# Patient Record
Sex: MALE | Race: WHITE | NOT HISPANIC OR LATINO | Employment: OTHER | URBAN - METROPOLITAN AREA
[De-identification: names, ages, dates, MRNs, and addresses within clinical notes are randomized per-mention and may not be internally consistent; named-entity substitution may affect disease eponyms.]

---

## 2022-10-26 ENCOUNTER — APPOINTMENT (OUTPATIENT)
Dept: INTERVENTIONAL RADIOLOGY/VASCULAR | Facility: CLINIC | Age: 62
End: 2022-10-26
Attending: STUDENT IN AN ORGANIZED HEALTH CARE EDUCATION/TRAINING PROGRAM
Payer: COMMERCIAL

## 2022-10-26 ENCOUNTER — HOSPITAL ENCOUNTER (INPATIENT)
Facility: CLINIC | Age: 62
LOS: 12 days | Discharge: HOME HEALTH | End: 2022-11-07
Attending: STUDENT IN AN ORGANIZED HEALTH CARE EDUCATION/TRAINING PROGRAM | Admitting: INTERNAL MEDICINE
Payer: COMMERCIAL

## 2022-10-26 DIAGNOSIS — E10.9 INSULIN DEPENDENT DIABETES MELLITUS TYPE IA (H): ICD-10-CM

## 2022-10-26 DIAGNOSIS — K85.91 NECROTIZING PANCREATITIS: Primary | ICD-10-CM

## 2022-10-26 PROCEDURE — 250N000011 HC RX IP 250 OP 636

## 2022-10-26 PROCEDURE — 99223 1ST HOSP IP/OBS HIGH 75: CPT | Mod: GC | Performed by: INTERNAL MEDICINE

## 2022-10-26 PROCEDURE — 49406 IMAGE CATH FLUID PERI/RETRO: CPT

## 2022-10-26 PROCEDURE — C1729 CATH, DRAINAGE: HCPCS

## 2022-10-26 PROCEDURE — 272N000500 HC NEEDLE CR2

## 2022-10-26 PROCEDURE — 87075 CULTR BACTERIA EXCEPT BLOOD: CPT | Performed by: INTERNAL MEDICINE

## 2022-10-26 PROCEDURE — 250N000011 HC RX IP 250 OP 636: Performed by: STUDENT IN AN ORGANIZED HEALTH CARE EDUCATION/TRAINING PROGRAM

## 2022-10-26 PROCEDURE — C1769 GUIDE WIRE: HCPCS

## 2022-10-26 PROCEDURE — 258N000003 HC RX IP 258 OP 636

## 2022-10-26 PROCEDURE — 272N000192 HC ACCESSORY CR2

## 2022-10-26 PROCEDURE — 272N000504 HC NEEDLE CR4

## 2022-10-26 PROCEDURE — 87077 CULTURE AEROBIC IDENTIFY: CPT | Performed by: INTERNAL MEDICINE

## 2022-10-26 PROCEDURE — 99152 MOD SED SAME PHYS/QHP 5/>YRS: CPT | Mod: GC | Performed by: RADIOLOGY

## 2022-10-26 PROCEDURE — 250N000009 HC RX 250: Performed by: STUDENT IN AN ORGANIZED HEALTH CARE EDUCATION/TRAINING PROGRAM

## 2022-10-26 PROCEDURE — 87205 SMEAR GRAM STAIN: CPT | Performed by: INTERNAL MEDICINE

## 2022-10-26 PROCEDURE — 99152 MOD SED SAME PHYS/QHP 5/>YRS: CPT

## 2022-10-26 PROCEDURE — 99223 1ST HOSP IP/OBS HIGH 75: CPT | Mod: AI | Performed by: INTERNAL MEDICINE

## 2022-10-26 PROCEDURE — 120N000003 HC R&B IMCU UMMC

## 2022-10-26 PROCEDURE — 49406 IMAGE CATH FLUID PERI/RETRO: CPT | Mod: GC | Performed by: RADIOLOGY

## 2022-10-26 RX ORDER — NALOXONE HYDROCHLORIDE 0.4 MG/ML
0.2 INJECTION, SOLUTION INTRAMUSCULAR; INTRAVENOUS; SUBCUTANEOUS
Status: DISCONTINUED | OUTPATIENT
Start: 2022-10-26 | End: 2022-11-07 | Stop reason: HOSPADM

## 2022-10-26 RX ORDER — FLUMAZENIL 0.1 MG/ML
0.2 INJECTION, SOLUTION INTRAVENOUS
Status: DISCONTINUED | OUTPATIENT
Start: 2022-10-26 | End: 2022-10-26 | Stop reason: HOSPADM

## 2022-10-26 RX ORDER — SODIUM CHLORIDE, SODIUM LACTATE, POTASSIUM CHLORIDE, CALCIUM CHLORIDE 600; 310; 30; 20 MG/100ML; MG/100ML; MG/100ML; MG/100ML
INJECTION, SOLUTION INTRAVENOUS CONTINUOUS
Status: DISCONTINUED | OUTPATIENT
Start: 2022-10-26 | End: 2022-10-29

## 2022-10-26 RX ORDER — NALOXONE HYDROCHLORIDE 0.4 MG/ML
0.4 INJECTION, SOLUTION INTRAMUSCULAR; INTRAVENOUS; SUBCUTANEOUS
Status: DISCONTINUED | OUTPATIENT
Start: 2022-10-26 | End: 2022-11-07 | Stop reason: HOSPADM

## 2022-10-26 RX ORDER — PROCHLORPERAZINE 25 MG
25 SUPPOSITORY, RECTAL RECTAL EVERY 12 HOURS PRN
Status: DISCONTINUED | OUTPATIENT
Start: 2022-10-26 | End: 2022-11-07 | Stop reason: HOSPADM

## 2022-10-26 RX ORDER — AMOXICILLIN 250 MG
2 CAPSULE ORAL 2 TIMES DAILY PRN
Status: DISCONTINUED | OUTPATIENT
Start: 2022-10-26 | End: 2022-11-07 | Stop reason: HOSPADM

## 2022-10-26 RX ORDER — HYDROMORPHONE HYDROCHLORIDE 1 MG/ML
0.5 INJECTION, SOLUTION INTRAMUSCULAR; INTRAVENOUS; SUBCUTANEOUS
Status: DISCONTINUED | OUTPATIENT
Start: 2022-10-26 | End: 2022-10-27

## 2022-10-26 RX ORDER — METRONIDAZOLE 500 MG/100ML
500 INJECTION, SOLUTION INTRAVENOUS EVERY 8 HOURS
Status: DISCONTINUED | OUTPATIENT
Start: 2022-10-27 | End: 2022-10-27

## 2022-10-26 RX ORDER — ONDANSETRON 4 MG/1
4 TABLET, ORALLY DISINTEGRATING ORAL EVERY 6 HOURS PRN
Status: DISCONTINUED | OUTPATIENT
Start: 2022-10-26 | End: 2022-10-26

## 2022-10-26 RX ORDER — NALOXONE HYDROCHLORIDE 0.4 MG/ML
0.2 INJECTION, SOLUTION INTRAMUSCULAR; INTRAVENOUS; SUBCUTANEOUS
Status: DISCONTINUED | OUTPATIENT
Start: 2022-10-26 | End: 2022-10-26 | Stop reason: HOSPADM

## 2022-10-26 RX ORDER — CEFAZOLIN SODIUM 2 G/100ML
2 INJECTION, SOLUTION INTRAVENOUS ONCE
Status: COMPLETED | OUTPATIENT
Start: 2022-10-26 | End: 2022-10-26

## 2022-10-26 RX ORDER — LIDOCAINE 40 MG/G
CREAM TOPICAL
Status: DISCONTINUED | OUTPATIENT
Start: 2022-10-26 | End: 2022-11-07 | Stop reason: HOSPADM

## 2022-10-26 RX ORDER — NALOXONE HYDROCHLORIDE 0.4 MG/ML
0.4 INJECTION, SOLUTION INTRAMUSCULAR; INTRAVENOUS; SUBCUTANEOUS
Status: DISCONTINUED | OUTPATIENT
Start: 2022-10-26 | End: 2022-10-26 | Stop reason: HOSPADM

## 2022-10-26 RX ORDER — PROCHLORPERAZINE MALEATE 5 MG
10 TABLET ORAL EVERY 6 HOURS PRN
Status: DISCONTINUED | OUTPATIENT
Start: 2022-10-26 | End: 2022-11-07 | Stop reason: HOSPADM

## 2022-10-26 RX ORDER — LIDOCAINE 40 MG/G
CREAM TOPICAL
Status: CANCELLED | OUTPATIENT
Start: 2022-10-26

## 2022-10-26 RX ORDER — POLYETHYLENE GLYCOL 3350 17 G/17G
17 POWDER, FOR SOLUTION ORAL DAILY PRN
Status: DISCONTINUED | OUTPATIENT
Start: 2022-10-26 | End: 2022-11-07 | Stop reason: HOSPADM

## 2022-10-26 RX ORDER — FENTANYL CITRATE 50 UG/ML
25-50 INJECTION, SOLUTION INTRAMUSCULAR; INTRAVENOUS EVERY 5 MIN PRN
Status: DISCONTINUED | OUTPATIENT
Start: 2022-10-26 | End: 2022-10-26 | Stop reason: HOSPADM

## 2022-10-26 RX ORDER — AMOXICILLIN 250 MG
1 CAPSULE ORAL 2 TIMES DAILY PRN
Status: DISCONTINUED | OUTPATIENT
Start: 2022-10-26 | End: 2022-11-07 | Stop reason: HOSPADM

## 2022-10-26 RX ORDER — ONDANSETRON 2 MG/ML
4 INJECTION INTRAMUSCULAR; INTRAVENOUS EVERY 6 HOURS PRN
Status: DISCONTINUED | OUTPATIENT
Start: 2022-10-26 | End: 2022-10-26

## 2022-10-26 RX ADMIN — FENTANYL CITRATE 50 MCG: 0.05 INJECTION, SOLUTION INTRAMUSCULAR; INTRAVENOUS at 18:37

## 2022-10-26 RX ADMIN — FENTANYL CITRATE 50 MCG: 0.05 INJECTION, SOLUTION INTRAMUSCULAR; INTRAVENOUS at 18:52

## 2022-10-26 RX ADMIN — MIDAZOLAM 1 MG: 1 INJECTION INTRAMUSCULAR; INTRAVENOUS at 19:00

## 2022-10-26 RX ADMIN — SODIUM CHLORIDE, POTASSIUM CHLORIDE, SODIUM LACTATE AND CALCIUM CHLORIDE: 600; 310; 30; 20 INJECTION, SOLUTION INTRAVENOUS at 17:51

## 2022-10-26 RX ADMIN — LIDOCAINE HYDROCHLORIDE 10 ML: 10 INJECTION, SOLUTION EPIDURAL; INFILTRATION; INTRACAUDAL; PERINEURAL at 19:32

## 2022-10-26 RX ADMIN — HYDROMORPHONE HYDROCHLORIDE 0.5 MG: 1 INJECTION, SOLUTION INTRAMUSCULAR; INTRAVENOUS; SUBCUTANEOUS at 21:01

## 2022-10-26 RX ADMIN — MIDAZOLAM 2 MG: 1 INJECTION INTRAMUSCULAR; INTRAVENOUS at 18:36

## 2022-10-26 RX ADMIN — CEFAZOLIN SODIUM 2 G: 2 INJECTION, SOLUTION INTRAVENOUS at 18:52

## 2022-10-26 RX ADMIN — SODIUM CHLORIDE, POTASSIUM CHLORIDE, SODIUM LACTATE AND CALCIUM CHLORIDE: 600; 310; 30; 20 INJECTION, SOLUTION INTRAVENOUS at 23:12

## 2022-10-26 ASSESSMENT — ACTIVITIES OF DAILY LIVING (ADL)
ADLS_ACUITY_SCORE: 35
TOILETING_ISSUES: NO
CHANGE_IN_FUNCTIONAL_STATUS_SINCE_ONSET_OF_CURRENT_ILLNESS/INJURY: NO
DIFFICULTY_EATING/SWALLOWING: NO
WALKING_OR_CLIMBING_STAIRS_DIFFICULTY: NO
ADLS_ACUITY_SCORE: 22
CONCENTRATING,_REMEMBERING_OR_MAKING_DECISIONS_DIFFICULTY: NO
ADLS_ACUITY_SCORE: 22
FALL_HISTORY_WITHIN_LAST_SIX_MONTHS: NO
DRESSING/BATHING_DIFFICULTY: NO
ADLS_ACUITY_SCORE: 25
WEAR_GLASSES_OR_BLIND: YES
DOING_ERRANDS_INDEPENDENTLY_DIFFICULTY: NO

## 2022-10-26 ASSESSMENT — COLUMBIA-SUICIDE SEVERITY RATING SCALE - C-SSRS
5. HAVE YOU STARTED TO WORK OUT OR WORKED OUT THE DETAILS OF HOW TO KILL YOURSELF? DO YOU INTEND TO CARRY OUT THIS PLAN?: NO
6. HAVE YOU EVER DONE ANYTHING, STARTED TO DO ANYTHING, OR PREPARED TO DO ANYTHING TO END YOUR LIFE?: NO
4. HAVE YOU HAD THESE THOUGHTS AND HAD SOME INTENTION OF ACTING ON THEM?: NO
3. HAVE YOU BEEN THINKING ABOUT HOW YOU MIGHT KILL YOURSELF?: NO
1. IN THE PAST MONTH, HAVE YOU WISHED YOU WERE DEAD OR WISHED YOU COULD GO TO SLEEP AND NOT WAKE UP?: NO
2. HAVE YOU ACTUALLY HAD ANY THOUGHTS OF KILLING YOURSELF IN THE PAST MONTH?: NO

## 2022-10-26 NOTE — LETTER
Transition Communication Hand-off for Care Transitions to Next Level of Care Provider    Name: Vernon Ortiz  : 1960  MRN #: [id number]  Primary Care Provider: MANISH ROSENBAUM     Primary Clinic: [address] [address]     Reason for Hospitalization:  Necrotizing pancreatitis [K85.91]  Admit Date/Time: 10/26/2022  4:57 PM  Discharge Date: 2022  Payor Source: Payor: BLUE PLUS / Plan: BLUE PLUS / Product Type: HMO /   Reason for Communication Hand-off Referral: Other Continuity of care    Discharge Plan:  Home with home health RN and outpatient follow up       Concern for non-adherence with plan of care:   No  Discharge Needs Assessment:  Needs    Flowsheet Row Most Recent Value   Equipment Currently Used at Home none   # of Referrals Placed by CTS External Care Coordination, Homecare          Follow-up specialty is recommended: Yes    Follow-up plan:    Future Appointments   Date Time Provider Department Center   2022 12:00 PM Juan Santos MD [facility]   2023 10:00 AM [facility]       Any outstanding tests or procedures:        Referrals     Future Labs/Procedures    Home Care Referral     Comments:    [facility] [phone], Fax [phone].    Your provider has ordered home health services. If you have not been contacted within 2 days of your discharge please call the inpatient department phone number at [phone] .          Supplies     Future Labs/Procedures    ALCOHOL WIPES PER BOX           Santos Recommendations:    Attached please find final discharge orders    Reshma Souza RN    AVS/Discharge Summary is the source of truth; this is a helpful guide for improved communication of patient story

## 2022-10-26 NOTE — LETTER
[facility] [address]  [address] [facility] [address] [facility] [address]  [phone]    FACSIMILE TRANSMITTAL SHEET    TO: [facility] [phone], Fax [phone].    _____URGENT _____REVIEW ONLY _____PLEASE COMMENT____PLEASE REPLY    NOTES/COMMENTS: Please see attached clinical information for Vernon Angel.  Will fax final discharge orders when available.  Plan for home RN.  Anticipate discharge home on Friday 11/4.    Shannon Souza, RN BSN, PHN, ACM-RN  7A RN Care Coordinator  Phone: [phone]  Pager [phone]    To contact the weekend RNCC  [place] (0800 - 1630) Saturday and Sunday    Units: 4A, 4C, 4E, 5A and 5B- Pager 1: [phone]    Units: 6A, 6B, 6C, 6D- Pager 2: [phone]    Units: 7A, 7B, 7C, 7D, and 5C-Pager 3: [phone]    [facility] ([phone]) Saturday and Sunday    Units: 5 Ortho, 8A, 10 ICU, & Pediatric Units-Pager 4: [phone]    11/3/2022 2:12 PM                                        IF YOU DID NOT RECEIVE THE CORRECT NUMBER OF PAGES OR THE FAX DID NOT COME THROUGH CLEARLY, PLEASE CALL THE SENDER     CONFIDENTIALITY STATEMENT: Confidential information that may accompany this transmission contains protected health information under state and federal law and is legally privileged. This information is intended only for the use of the individual or entity named above and may be used only for carrying out treatment, payment or other healthcare operations. The recipient or person responsible for delivering this information is prohibited by law from disclosing this information without proper authorization to any other party, unless required to do so by law or regulation. If you are not the intended recipient, you are hereby notified that any review, dissemination, distribution, or copying of this message is strictly prohibited. If you have received this communication in error, please destroy the materials and contact us immediately by calling the number  listed above. No response indicates that the information was received by the appropriate authorized party

## 2022-10-26 NOTE — H&P
[facility]    History and Physical - Medicine Service, MAROON TEAM        Date of Admission:  10/26/2022    Assessment & Plan      Vernon Ortiz is a 62 year old male admitted on 10/26/2022. He has a history of HLD and hypothyroidism and is admitted for necrotizing pancreatitis.    #Necrotizing pancreatitis  Patient presenting with necrotizing pancreatitis from OSH for VARD. The patient has been vitally stable, will go for IR drain this evening with GI to see patient in the am. WBC improved to 11.7 today, lipase decreased to 650 from 1290, LFTs are improving. Troponin negative, TG WNL. Denies trauma or recent abdominal procedure. Had similar abdominal pain noted in PCP visit in January without further workup at that time. No known autoimmune disease, rare alcohol use, no recent steroids. Patient not on any commonly offending drugs.   Etiology: idiopathic most likely  -  ml/hr continuous  - NPO  - Continue Cefipime and ISAAC  - GI consulted  - IR to take patient for drain  -->drain to gravity, can start flushing tomorrow, drain cares  - Pain regimen: Dilaudid 0.5 mg Q3H PRN  - Nausea: Compazine PRN, HELD zofran 2/2 elongated QTc    -- CHRONIC --  #HLD  Lipids WNL as above  - HELD Simvastatin 40 mg     #Hypothyroidism  - HELD Levothyroxine 100 mcg     Diet: NPO for Medical/Clinical Reasons Except for: Meds    DVT Prophylaxis: Taken for procedure, held anticoagulation and will defer to GI recs  Hare Catheter: Not present  Fluids: 200 ml/hr continuous  Central Lines: None  Cardiac Monitoring: None  Code Status: Full Code      Clinically Significant Risk Factors Present on Admission                              Disposition Plan  Pending GI and procedures     Expected Discharge Date: 10/30/2022                The patient's care was discussed with the Attending Physician, Dr. Corcoran.    Jennifer Smart MD  Medicine Service, MAROON TEAM   [facility]  [facility]  Securely message with the [employer] Web Console (learn more here)  Text page via [facility] Paging/Directory   Please see signed in provider for up to date coverage information    ______________________________________________________________________    Chief Complaint   Necrotizing Pancreatitis    History is obtained from the patient    History of Present Illness   Vernon Ortiz is a 62 year old male who presents with necrotizing pancreatitis from OSH.    Per OSH HPI:  62-year-old male who presents to the ED with complaints of abdominal pain and distention since Sunday evening. Pain initially presented in the epigastric region that felt like heartburn. Patient has been vomiting since Sunday evening after eating last emesis being at 3 AM. Did have a protein shake this morning at 830 and has not vomited since. last bowel movement on Sunday. Reports that he has been passing minimal gas. Currently, describes the pain is diffuse and abdomen is tender. Patient reports that he has been diaphoretic and that he is generally weak due to decreased oral intake reports that he has been trying to stay hydrated hydrated, however does feel that his urine has become increasingly darker. Denies chills and fever. Denies any recent illness. Denies any history of bowel obstruction reports that he had an appendectomy in 2018, no other surgical history. Patient is currently denying chest pain and shortness of breath. Denies any vision changes, Flank pain, headache, dysuria, dizziness, nausea, or diarrhea. Did take a home COVID test yesterday that was negative.    WORKUP SUMMARY  - Patient presented the clinic on Tuesday morning with abdominal pain since Sunday. He had associated with not really having a bowel movement. His last bowel movement was Sunday. At that time he had no overt/remarkable fever or chills or sweats or diarrhea. In the evaluation his lipase was noted to be 1300 and LFTs normal.  - He was transferred  to the ER. CT scan revealed acute pancreatitis, necrosis versus infection.  - GI [facility] consulted via phone. Recommended no antibiotics. Recommended fluids and pain control. Did state at that time they would not be able to take/admit to [facility]. Working recommendation would be that he would need an ERCP and higher level of care such as [facility] or [facility] would be appropriate. [facility] consulted via phone. Not able to take due to lack of beds.  - He was noted to have an acute kidney injury. This was treated with IV fluids.  - Initially his lactic acid was elevated, this improved, [place]ston got worse, now improved  - He was initially given IV fluids 3 L normal saline. This was transitioned to lactated Ringer's at 300/h. 2100 on Tuesday, 10/25/2022  - Dilaudid has been ordered for pain control. He has needed this intermittently  - Did spike fever of 102+ on Tuesday, 10/25/2022.  - Working plan was to start antibiotics with any fever. IV cefepime and Flagyl started Tuesday, 10/25/2022  - Hypomagnesia -- electrolyte protocol  - 6 AM labs Wednesday, 10/26/2022. Lactic acid normal. Creatinine 1.3. LFTs elevated. CBC stable. Lipase 1290 last night. Pending this morning. CRP elevated 31.16 last night. CRP 35.08 this morning. Lipase 650 this morning. Amylase 674 this morning. AST ALT slightly elevated this morning.    Patient was transferred to the [facility] in stable condition. On my interview, the patient reports feeling beaten down from the past few days. He confirms his initial presenting complaints. States that he is not nauseous and has not vomited today. States his pain is moderately controlled, currently 4-6/10 which is what is has been since admission. No fevers or chills. Otherwise no change in status. Interview interrupted by transport coming to take the patient to IR.    Review of Systems    The 10 point Review of Systems is negative other than noted in the HPI or  here.     Past Medical History    I have reviewed this patient's medical history and updated it with pertinent information if needed.   No past medical history on file. No significant PMH    Past Surgical History   I have reviewed this patient's surgical history and updated it with pertinent information if needed.  Appendectomy 2018    Social History   I have reviewed this patient's social history and updated it with pertinent information if needed. Vernon Ortiz  No significant alcohol consumption.     Family History   Not pertinent    Prior to Admission Medications   None     Allergies   No Known Allergies    Physical Exam   Vital Signs: Temp: 98  F (36.7  C)   BP: (!) 137/94 Pulse: 103   Resp: 20 SpO2: 97 %      Weight: 0 lbs 0 oz    Constitutional: awake, alert, cooperative, no apparent distress, and appears stated age  Eyes: lids and lashes normal and extra-ocular muscles intact  Respiratory: No increased work of breathing, good air exchange, clear to auscultation bilaterally, no crackles or wheezing  Cardiovascular: regular rate and rhythm and no edema  GI: distended and diffusely TTP without rebound or guarding, no peritonitic signs  Skin: no bruising or bleeding, normal skin color, texture, turgor and no rashes  Neurologic: Awake, alert, oriented to name, place and time.  Cranial nerves II-XII are grossly intact.    Data   Data reviewed today: I reviewed all medications, new labs and imaging results over the last 24 hours. I personally reviewed   EKG QTc 541  Imaging:  US ABD RUQ    Result Date: 10/25/2022  EXAM: US ABD RUQ INDICATION: Abdominal pain, necrotizing pancreatitis. COMPARISON: CT abdomen/pelvis from 10/25/2022 FINDINGS: Liver parenchyma demonstrates coarse heterogeneous echogenicity. No focal hepatic lesion is seen. No intrahepatic biliary dilatation. Common bile duct is not visualized on ultrasound however did not appear dilated on CT scan. Trace fluid adjacent to the gallbladder. Gallbladder is  normal in size. Gallbladder wall is upper limits of normal in thickness at 3 mm. Negative reported sonographic Parson's sign. No gallstones or sludge. The pancreas is not visualized and obscured by the gas. Right kidney measures 11.7 cm in length without evidence for hydronephrosis. There are 2 cysts seen of the right kidney measuring up to 3.8 x 4.3 cm in size. IMPRESSION: 1. Pancreas is not visualized due to artifact from gas. On CT, patient had nonenhancing pancreatic parenchyma and gas within the pancreatic bed consistent with emphysematous necrotizing pancreatitis. 2. No gallstones or biliary dilatation. 3. Right renal cysts.     XR CHEST ONE VIEW PORTABLE    Result Date: 10/25/2022  EXAM: XR CHEST ONE VIEW PORTABLE INDICATION: Fever COMPARISON: 01/08/2022 FINDINGS: Frontal view chest obtained. Low lung volumes. Cardiomediastinal silhouette is normal. Hazy retrocardiac opacities. No effusion or pneumothorax. Osseous structures are normal limits. IMPRESSION: 1. Low lung volumes limit evaluation. 2. Retrocardiac opacities likely atelectasis though pneumonia could have a similar appearance.     CT ABD AND PELVIS WITH IV CONTRAST    Result Date: 10/25/2022  EXAM: CT ABD AND PELVIS WITH IV CONTRAST  IMPRESSION: 1. Pancreatic hypoattenuation and adjacent stranding consistent with acute pancreatitis, unclear as to whether this represents simple edema or necrosis. There is a focal complex fluid and gas collection along the superior aspect of the pancreatic body/tail junction suspicious for superimposed infection. This is not an organized abscess at this time and given location is not amenable to percutaneous drainage. Hollow viscus perforation considered much less likely. 2. Reactive fluid along the pericolic gutters and small volume pelvic free fluid. Few prominent loops of small bowel in the left upper quadrant most likely reactive as well. 3. Distended gallbladder. No secondary evidence of acute cholecystitis or  evidence for cholelithiasis. COMMUNICATION: CODE 3: This report contains unexpected but non-urgent abnormality that requires clinical follow-up or additional imaging. The information above was relayed directly by me by telephone to FIONA HERBERT at 11:11 on 10/25/2022.     No lab results found in last 7 days.

## 2022-10-26 NOTE — LETTER
[facility] [address] [place]  [address] [address]  [phone]    FACSIMILE TRANSMITTAL SHEET    TO: [facility]    _____URGENT _____REVIEW ONLY _____PLEASE COMMENT____PLEASE REPLY    NOTES/COMMENTS: Attached please find final discharge orders for Vernon Ortiz.  Patient discharging home today.  Questions please let me know.  Thanks    Shannon Souza, RN BSN, PHN, ACM-RN  7A RN Care Coordinator  Phone: [phone]  Pager [phone]    To contact the weekend RNCC  [place] (0800 - 1630) Saturday and Sunday    Units: 4A, 4C, 4E, 5A and 5B- Pager 1: [phone]    Units: 6A, 6B, 6C, 6D- Pager 2: [phone]    Units: 7A, 7B, 7C, 7D, and 5C-Pager 3: [phone]    [facility] ([phone]) Saturday and Sunday    Units: 5 Ortho, 8A, 10 ICU, & Pediatric Units-Pager 4: [phone]    11/7/2022 2:23 PM                                      IF YOU DID NOT RECEIVE THE CORRECT NUMBER OF PAGES OR THE FAX DID NOT COME THROUGH CLEARLY, PLEASE CALL THE SENDER     CONFIDENTIALITY STATEMENT: Confidential information that may accompany this transmission contains protected health information under state and federal law and is legally privileged. This information is intended only for the use of the individual or entity named above and may be used only for carrying out treatment, payment or other healthcare operations. The recipient or person responsible for delivering this information is prohibited by law from disclosing this information without proper authorization to any other party, unless required to do so by law or regulation. If you are not the intended recipient, you are hereby notified that any review, dissemination, distribution, or copying of this message is strictly prohibited. If you have received this communication in error, please destroy the materials and contact us immediately by calling the number listed above. No response indicates that the information was received  by the appropriate authorized party

## 2022-10-26 NOTE — PROGRESS NOTES
Admission          10/26/2022  4:57 PM  -----------------------------------------------------------  Reason for admission:  Primary team notified of pt arrival.  Admitted from:  Via: stretcher  Accompanied by: EMS  Belongings: only brought glasses, family bringing clothes and cell phone  Admission Profile: complete  Teaching: orientation to unit and call light- call light within reach, call don't fall, use of console, meal times, when to call for the RN, and enforced importance of safety   Access: PIV  Telemetry:Placed on pt  Code Status verified on armband: yes  2 RN Skin Assessment Completed By: Jennifer GUADALUPE   Med Rec completed: no  Bed surface reassessed with algorithm and charted: yes  New bed surface ordered: no  Suction/Ambu bag/Flowmeter at bedside: yes  Is patient having diarrhea upon admission- if YES fill out testing algorithm : no    Pt status:  Patient alert and oriented x4. 's, BP's 130's/90's, afebrile, on 2 L NC. SOB present with activity. Complains of abdominal pain. Patient sent down to IR for drain placement. Will continue with plan of care.

## 2022-10-26 NOTE — PRE-PROCEDURE
GENERAL PRE-PROCEDURE:   Procedure:  Image guided abdominal drain placement    Verbal consent obtained?: Yes    Written consent obtained?: Yes    Risks and benefits: Risks, benefits and alternatives were discussed    Consent given by:  Patient  Patient states understanding of procedure being performed: Yes    Patient's understanding of procedure matches consent: Yes    Procedure consent matches procedure scheduled: Yes    Expected level of sedation:  Moderate  Appropriately NPO:  Yes  ASA Class:  3  Mallampati  :  Grade 2- soft palate, base of uvula, tonsillar pillars, and portion of posterior pharyngeal wall visible  Lungs:  Lungs clear with good breath sounds bilaterally  Heart:  Normal heart sounds with tachycardia  History & Physical reviewed:  History and physical reviewed and no updates needed  Statement of review:  I have reviewed the lab findings, diagnostic data, medications, and the plan for sedation

## 2022-10-26 NOTE — IR NOTE
Patient Name: Vernon Ortiz  Medical Record Number: [id number]  Today's Date: 10/26/2022    Procedure: abdominal drain placement  Proceduralist: Norma Arce and Pelon    Procedure Start: 1845  Procedure end: 1950  Sedation medications administered: versed 3 Mg., fentanyl 100  Mcg.    Report given to: 6B RN    Other Notes: Pt arrived to IR room 1 from . Consent reviewed. Pt denies any questions or concerns regarding procedure. Pt positioned supine and monitored per protocol. Pt tolerated procedure without any noted complications. Pt transferred back to .

## 2022-10-26 NOTE — PROGRESS NOTES
[facility]  Transfer Triage Note    Date of call: 10/26/22  Time of call: 10:24 AM    Current Patient Location: [facility]  Current Level of Care: ICU    Vitals: BP:100s systolic HR: 100s O2 Sats: RA  Diagnosis: nec panc  Is COVID-19 a concern? No  Reason for requested transfer: Procedure can be done here and not at referring hospital   Isolation Needs: None    Outside Records: Available  Additional records may be faxed to [phone].    Transfer accepted: Yes  Stability of Patient: Patient is at risk for instability, however patient requires urgent transfer and does not meet ICU criteria   Level of Care Needed: IMC  Telemetry Needed:  None  Expected Time of Arrival for Transfer: 0-8 hours  Arrival Location:  [facility]    Recommendations for Management and Stabilization: Not needed    Additional Comments:61 yo with no significant PMH presenting with nec panc to outside hospital.  Has lipase in the 1300s with CRP >30 and mild darcy.  Patient initially ill appearing and started on IV fluids.  GI at [facility] reports that he does not need ERCP and recommended transfer to [place] or [facility].  Now febrile 102 with lactic acidosis.  Started on cefepime and flagyl.  OSH GI is recommending VARD.  Dr. Santos joined line and recommends urgent transfer.  The patient is now clinically stable, but Dr. Santos feels this could change rapidly if intervention does not occur within the next 24 hours.  Dr. Santos to discuss with IR and gen surgery for possible VARD planning/timing.  Requesting IMC bed with medicine primary.    Chino Somers MD

## 2022-10-27 ENCOUNTER — APPOINTMENT (OUTPATIENT)
Dept: GENERAL RADIOLOGY | Facility: CLINIC | Age: 62
End: 2022-10-27
Attending: PHYSICIAN ASSISTANT
Payer: COMMERCIAL

## 2022-10-27 LAB
ALBUMIN SERPL BCG-MCNC: 2.9 G/DL (ref 3.5–5.2)
ALP SERPL-CCNC: 47 U/L (ref 40–129)
ALT SERPL W P-5'-P-CCNC: 42 U/L (ref 10–50)
ANION GAP SERPL CALCULATED.3IONS-SCNC: 8 MMOL/L (ref 7–15)
AST SERPL W P-5'-P-CCNC: 49 U/L (ref 10–50)
ATRIAL RATE - MUSE: 98 BPM
BILIRUB SERPL-MCNC: 0.9 MG/DL
BUN SERPL-MCNC: 23.3 MG/DL (ref 8–23)
CALCIUM SERPL-MCNC: 7.9 MG/DL (ref 8.8–10.2)
CHLORIDE SERPL-SCNC: 106 MMOL/L (ref 98–107)
CREAT SERPL-MCNC: 0.99 MG/DL (ref 0.67–1.17)
CRP SERPL-MCNC: 262 MG/L
DEPRECATED HCO3 PLAS-SCNC: 25 MMOL/L (ref 22–29)
DIASTOLIC BLOOD PRESSURE - MUSE: NORMAL MMHG
ERYTHROCYTE [DISTWIDTH] IN BLOOD BY AUTOMATED COUNT: 13.6 % (ref 10–15)
GFR SERPL CREATININE-BSD FRML MDRD: 85 ML/MIN/1.73M2
GLUCOSE SERPL-MCNC: 157 MG/DL (ref 70–99)
HBA1C MFR BLD: 5.6 %
HCT VFR BLD AUTO: 42.4 % (ref 40–53)
HGB BLD-MCNC: 14.1 G/DL (ref 13.3–17.7)
INTERPRETATION ECG - MUSE: NORMAL
MAGNESIUM SERPL-MCNC: 2.1 MG/DL (ref 1.7–2.3)
MCH RBC QN AUTO: 30 PG (ref 26.5–33)
MCHC RBC AUTO-ENTMCNC: 33.3 G/DL (ref 31.5–36.5)
MCV RBC AUTO: 90 FL (ref 78–100)
NT-PROBNP SERPL-MCNC: 170 PG/ML (ref 0–900)
P AXIS - MUSE: 49 DEGREES
PHOSPHATE SERPL-MCNC: 2 MG/DL (ref 2.5–4.5)
PLATELET # BLD AUTO: 134 10E3/UL (ref 150–450)
POTASSIUM SERPL-SCNC: 4.6 MMOL/L (ref 3.4–5.3)
PR INTERVAL - MUSE: 144 MS
PROT SERPL-MCNC: 5.6 G/DL (ref 6.4–8.3)
QRS DURATION - MUSE: 74 MS
QT - MUSE: 324 MS
QTC - MUSE: 413 MS
R AXIS - MUSE: 28 DEGREES
RBC # BLD AUTO: 4.7 10E6/UL (ref 4.4–5.9)
SARS-COV-2 RNA RESP QL NAA+PROBE: NEGATIVE
SODIUM SERPL-SCNC: 139 MMOL/L (ref 136–145)
SYSTOLIC BLOOD PRESSURE - MUSE: NORMAL MMHG
T AXIS - MUSE: 48 DEGREES
VENTRICULAR RATE- MUSE: 98 BPM
WBC # BLD AUTO: 12 10E3/UL (ref 4–11)

## 2022-10-27 PROCEDURE — 85027 COMPLETE CBC AUTOMATED: CPT

## 2022-10-27 PROCEDURE — 80053 COMPREHEN METABOLIC PANEL: CPT

## 2022-10-27 PROCEDURE — 250N000013 HC RX MED GY IP 250 OP 250 PS 637: Performed by: PHYSICIAN ASSISTANT

## 2022-10-27 PROCEDURE — 84100 ASSAY OF PHOSPHORUS: CPT

## 2022-10-27 PROCEDURE — 258N000003 HC RX IP 258 OP 636

## 2022-10-27 PROCEDURE — 999N000248 HC STATISTIC IV INSERT WITH US BY RN

## 2022-10-27 PROCEDURE — 99223 1ST HOSP IP/OBS HIGH 75: CPT | Mod: GC | Performed by: SURGERY

## 2022-10-27 PROCEDURE — 250N000011 HC RX IP 250 OP 636

## 2022-10-27 PROCEDURE — 120N000003 HC R&B IMCU UMMC

## 2022-10-27 PROCEDURE — 83735 ASSAY OF MAGNESIUM: CPT

## 2022-10-27 PROCEDURE — 71045 X-RAY EXAM CHEST 1 VIEW: CPT

## 2022-10-27 PROCEDURE — 93005 ELECTROCARDIOGRAM TRACING: CPT

## 2022-10-27 PROCEDURE — 99232 SBSQ HOSP IP/OBS MODERATE 35: CPT | Performed by: SURGERY

## 2022-10-27 PROCEDURE — 83880 ASSAY OF NATRIURETIC PEPTIDE: CPT | Performed by: PHYSICIAN ASSISTANT

## 2022-10-27 PROCEDURE — 250N000009 HC RX 250: Performed by: STUDENT IN AN ORGANIZED HEALTH CARE EDUCATION/TRAINING PROGRAM

## 2022-10-27 PROCEDURE — 250N000009 HC RX 250: Performed by: PHYSICIAN ASSISTANT

## 2022-10-27 PROCEDURE — 0W9G30Z DRAINAGE OF PERITONEAL CAVITY WITH DRAINAGE DEVICE, PERCUTANEOUS APPROACH: ICD-10-PCS | Performed by: RADIOLOGY

## 2022-10-27 PROCEDURE — 36415 COLL VENOUS BLD VENIPUNCTURE: CPT | Performed by: PHYSICIAN ASSISTANT

## 2022-10-27 PROCEDURE — U0005 INFEC AGEN DETEC AMPLI PROBE: HCPCS | Performed by: PHYSICIAN ASSISTANT

## 2022-10-27 PROCEDURE — 999N000157 HC STATISTIC RCP TIME EA 10 MIN

## 2022-10-27 PROCEDURE — 999N000128 HC STATISTIC PERIPHERAL IV START W/O US GUIDANCE

## 2022-10-27 PROCEDURE — 94640 AIRWAY INHALATION TREATMENT: CPT

## 2022-10-27 PROCEDURE — 86140 C-REACTIVE PROTEIN: CPT | Performed by: PHYSICIAN ASSISTANT

## 2022-10-27 PROCEDURE — 250N000011 HC RX IP 250 OP 636: Performed by: PHYSICIAN ASSISTANT

## 2022-10-27 PROCEDURE — 93010 ELECTROCARDIOGRAM REPORT: CPT | Performed by: INTERNAL MEDICINE

## 2022-10-27 PROCEDURE — 87040 BLOOD CULTURE FOR BACTERIA: CPT | Performed by: PHYSICIAN ASSISTANT

## 2022-10-27 PROCEDURE — 258N000003 HC RX IP 258 OP 636: Performed by: STUDENT IN AN ORGANIZED HEALTH CARE EDUCATION/TRAINING PROGRAM

## 2022-10-27 PROCEDURE — 99207 PR APP CREDIT; MD BILLING SHARED VISIT: CPT | Performed by: PHYSICIAN ASSISTANT

## 2022-10-27 PROCEDURE — 71045 X-RAY EXAM CHEST 1 VIEW: CPT | Mod: 26 | Performed by: RADIOLOGY

## 2022-10-27 PROCEDURE — 258N000003 HC RX IP 258 OP 636: Performed by: PHYSICIAN ASSISTANT

## 2022-10-27 PROCEDURE — 83036 HEMOGLOBIN GLYCOSYLATED A1C: CPT | Performed by: PHYSICIAN ASSISTANT

## 2022-10-27 PROCEDURE — 999N000202 HC STATISTICAL VASC ACCESS NURSE TIME, 1-15 MINUTES

## 2022-10-27 PROCEDURE — 99232 SBSQ HOSP IP/OBS MODERATE 35: CPT | Performed by: INTERNAL MEDICINE

## 2022-10-27 PROCEDURE — 36415 COLL VENOUS BLD VENIPUNCTURE: CPT

## 2022-10-27 RX ORDER — HYDROMORPHONE HYDROCHLORIDE 2 MG/1
2 TABLET ORAL EVERY 4 HOURS PRN
Status: DISCONTINUED | OUTPATIENT
Start: 2022-10-27 | End: 2022-11-07 | Stop reason: HOSPADM

## 2022-10-27 RX ORDER — HYDROMORPHONE HYDROCHLORIDE 1 MG/ML
.3-.5 INJECTION, SOLUTION INTRAMUSCULAR; INTRAVENOUS; SUBCUTANEOUS
Status: DISCONTINUED | OUTPATIENT
Start: 2022-10-27 | End: 2022-10-30

## 2022-10-27 RX ORDER — ALBUTEROL SULFATE 0.83 MG/ML
SOLUTION RESPIRATORY (INHALATION)
Status: DISCONTINUED
Start: 2022-10-27 | End: 2022-10-28 | Stop reason: HOSPADM

## 2022-10-27 RX ORDER — ACETAMINOPHEN 325 MG/1
650 TABLET ORAL EVERY 6 HOURS PRN
Status: DISCONTINUED | OUTPATIENT
Start: 2022-10-27 | End: 2022-11-07 | Stop reason: HOSPADM

## 2022-10-27 RX ORDER — AMOXICILLIN 250 MG
1 CAPSULE ORAL DAILY
Status: DISCONTINUED | OUTPATIENT
Start: 2022-10-27 | End: 2022-11-07 | Stop reason: HOSPADM

## 2022-10-27 RX ORDER — ACETAMINOPHEN 500 MG
500-1000 TABLET ORAL EVERY 6 HOURS PRN
COMMUNITY

## 2022-10-27 RX ORDER — POLYETHYLENE GLYCOL 3350 17 G/17G
17 POWDER, FOR SOLUTION ORAL DAILY
Status: DISCONTINUED | OUTPATIENT
Start: 2022-10-27 | End: 2022-10-30

## 2022-10-27 RX ORDER — MEROPENEM 1 G/1
1 INJECTION, POWDER, FOR SOLUTION INTRAVENOUS EVERY 8 HOURS
Status: DISCONTINUED | OUTPATIENT
Start: 2022-10-27 | End: 2022-10-29

## 2022-10-27 RX ORDER — SIMVASTATIN 40 MG
40 TABLET ORAL AT BEDTIME
COMMUNITY

## 2022-10-27 RX ORDER — OXYCODONE HYDROCHLORIDE 5 MG/1
5-10 TABLET ORAL EVERY 6 HOURS PRN
Status: DISCONTINUED | OUTPATIENT
Start: 2022-10-27 | End: 2022-10-27

## 2022-10-27 RX ORDER — HYDRALAZINE HYDROCHLORIDE 20 MG/ML
10 INJECTION INTRAMUSCULAR; INTRAVENOUS EVERY 6 HOURS PRN
Status: DISCONTINUED | OUTPATIENT
Start: 2022-10-27 | End: 2022-11-07 | Stop reason: HOSPADM

## 2022-10-27 RX ORDER — LEVOTHYROXINE SODIUM 100 UG/1
100 TABLET ORAL EVERY MORNING
COMMUNITY

## 2022-10-27 RX ORDER — LEVOTHYROXINE SODIUM 100 UG/1
100 TABLET ORAL DAILY
Status: DISCONTINUED | OUTPATIENT
Start: 2022-10-27 | End: 2022-11-07 | Stop reason: HOSPADM

## 2022-10-27 RX ORDER — ALBUTEROL SULFATE 0.83 MG/ML
2.5 SOLUTION RESPIRATORY (INHALATION) EVERY 6 HOURS PRN
Status: DISCONTINUED | OUTPATIENT
Start: 2022-10-27 | End: 2022-10-27

## 2022-10-27 RX ADMIN — HYDROMORPHONE HYDROCHLORIDE 0.5 MG: 1 INJECTION, SOLUTION INTRAMUSCULAR; INTRAVENOUS; SUBCUTANEOUS at 03:42

## 2022-10-27 RX ADMIN — SODIUM CHLORIDE, POTASSIUM CHLORIDE, SODIUM LACTATE AND CALCIUM CHLORIDE: 600; 310; 30; 20 INJECTION, SOLUTION INTRAVENOUS at 14:15

## 2022-10-27 RX ADMIN — HYDROMORPHONE HYDROCHLORIDE 0.5 MG: 1 INJECTION, SOLUTION INTRAMUSCULAR; INTRAVENOUS; SUBCUTANEOUS at 10:07

## 2022-10-27 RX ADMIN — METRONIDAZOLE 500 MG: 500 INJECTION, SOLUTION INTRAVENOUS at 09:56

## 2022-10-27 RX ADMIN — OXYCODONE HYDROCHLORIDE 10 MG: 5 TABLET ORAL at 12:41

## 2022-10-27 RX ADMIN — CEFEPIME HYDROCHLORIDE 2 G: 2 INJECTION, POWDER, FOR SOLUTION INTRAVENOUS at 09:00

## 2022-10-27 RX ADMIN — SODIUM CHLORIDE, POTASSIUM CHLORIDE, SODIUM LACTATE AND CALCIUM CHLORIDE: 600; 310; 30; 20 INJECTION, SOLUTION INTRAVENOUS at 20:57

## 2022-10-27 RX ADMIN — SENNOSIDES AND DOCUSATE SODIUM 1 TABLET: 8.6; 5 TABLET ORAL at 11:20

## 2022-10-27 RX ADMIN — MEROPENEM 1 G: 1 INJECTION, POWDER, FOR SOLUTION INTRAVENOUS at 11:21

## 2022-10-27 RX ADMIN — SODIUM CHLORIDE, POTASSIUM CHLORIDE, SODIUM LACTATE AND CALCIUM CHLORIDE: 600; 310; 30; 20 INJECTION, SOLUTION INTRAVENOUS at 04:56

## 2022-10-27 RX ADMIN — CEFEPIME HYDROCHLORIDE 2 G: 2 INJECTION, POWDER, FOR SOLUTION INTRAVENOUS at 00:58

## 2022-10-27 RX ADMIN — HYDROMORPHONE HYDROCHLORIDE 2 MG: 2 TABLET ORAL at 20:01

## 2022-10-27 RX ADMIN — POTASSIUM PHOSPHATE, MONOBASIC AND POTASSIUM PHOSPHATE, DIBASIC 15 MMOL: 224; 236 INJECTION, SOLUTION, CONCENTRATE INTRAVENOUS at 14:15

## 2022-10-27 RX ADMIN — LEVOTHYROXINE SODIUM 100 MCG: 100 TABLET ORAL at 11:21

## 2022-10-27 RX ADMIN — ALBUTEROL SULFATE 2.5 MG: 2.5 SOLUTION RESPIRATORY (INHALATION) at 20:32

## 2022-10-27 RX ADMIN — MEROPENEM 1 G: 1 INJECTION, POWDER, FOR SOLUTION INTRAVENOUS at 20:04

## 2022-10-27 RX ADMIN — ACETAMINOPHEN 650 MG: 325 TABLET, FILM COATED ORAL at 20:01

## 2022-10-27 RX ADMIN — METRONIDAZOLE 500 MG: 500 INJECTION, SOLUTION INTRAVENOUS at 02:03

## 2022-10-27 ASSESSMENT — ACTIVITIES OF DAILY LIVING (ADL)
ADLS_ACUITY_SCORE: 27
ADLS_ACUITY_SCORE: 25
ADLS_ACUITY_SCORE: 27

## 2022-10-27 NOTE — PROGRESS NOTES
flushed current PIV with 10ml NSS. no infiltration noted, pt. denies pain or discomfort, no tenderness or redness on the site, notified bedside nurse Catrina personally

## 2022-10-27 NOTE — PROGRESS NOTES
General Surgery Progress Note  10/27/2022   ------------------------------------------------------------------------------------------------  Assessment/Plan:   62 year old male with necrotizing pancreatitis status post IR drainage on 10/26/2022.  Doing well with no signs or symptoms of iatrogenic injury during drain placement or signs of decompensation.  Pain much better this morning.     - Continue fluid resuscitation and multimodal pain control per primary   - Drain appears to be properly placed with adequate drainage. Of note the pigtail portion of the drain is wrapping around the splenic vein, will need guidance or visualization when the drain is removed to protect the vasculature.    - No surgical intervention at this point, could be a candidate for a VARD in the future however for now we will allow the collection to further mature prior to any planned surgical debridement.    Subjective:  Doing well this morning, no acute events overnight.  Abdominal pain improving.  No chest pain no vomiting no nausea.  Passing flatus  ------------------------------------------------------------------------------------------------  Objective:  Temp:  [97.7  F (36.5  C)-99.6  F (37.6  C)] 98.4  F (36.9  C)  Pulse:  [] 87  Resp:  [20-28] 22  BP: ()/() 149/89  SpO2:  [91 %-97 %] 95 %    I/O last 3 completed shifts:  In: 2250 [I.V.:2240; Other:10]  Out: 965 [Urine:950; Drains:15]      Gen: Awake, interactive, NAD  Resp: breathing comfortably on room air  CV: regular rate, appears well perfused  Abd: soft, nondistended, nontender, retroperitoneal drain in place, draining dark fluid.  Incision: c/d/I  Ext: palpable pulses, no edema       Seen, examined, and discussed with chief resident, who will discuss with staff.    Kris Carrillo MD PGY1  Integrated Plastic and Reconstructive Surgery

## 2022-10-27 NOTE — CONSULTS
GENERAL SURGERY HISTORY & PHYSICAL    Patient: Vernon Ortiz   MRN: [id number]     Date of Admission: 10/26/2022    ASSESSMENT/PLAN:  Vernon Ortiz is a 62 year old male With history of dyslipidemia and hypothyroidism who presents for from OSH with necrotizing pancreatitis and is now status post IR anterior drain placement. EGS asked to evaluate for possible VARD and to follow in the event of decompensation after drain placement.    Currently seems to be doing quite well.  No signs or symptoms of iatrogenic injury during drain placement.  Continue care as per medicine no changes to current plan  Surgery to follow and be available    Patient and plan discussed with chief resident and in-house surgical staff    Maximino Wheatley MD PGY2  General Surgery    CC: Necrotizing pancreatitis    HPI: Vernon Ortiz is a 62 year old male with PMH of dyslipidemia and hypothyroidism who presents from OSH with necrotizing pancreatitis.  Per review and discussion with patient and his pain started on Sunday the middle of his abdomen.  He also had nausea and emesis.  He went to a clinic on Tuesday at which time his lipase was 1300 and he was transferred to local ED.  CT at the time showed acute pancreatitis with necrosis. Also noted to have MELY with creatinine 1.6 which has improved (most recently 1.03) and a lactic acidosis of 4.2 which has now cleared (most recently 2.0). Lipase prior to transfer now 650. Has been started on cefipime and metronidazole. Due to need for higher level of care, patient was transferred to [facility] for further cares.    Currently patient feels quite okay, pain is better than initially but still present. Is s/p IR anterior drain placement. No current fever, chills or sweats. No current nausea or emesis. Has urinated without issue after IR procedure.     PMH:  Dyslipidemia  Hypothyroidism    PSH:  Appendectomy    FH:  No family history on file.    SH:  Tobacco use: Denies  EtOH use: Very occasional use  Illicit drug  use: Denies  Lives in [place]     Allergies:  No Known Allergies    Home Meds:  No current outpatient medications on file.        ROS:   A 10-point review of systems was performed and otherwise negative except as reported in HPI.    Physical Exam:  Temp:  [97.7  F (36.5  C)-99.6  F (37.6  C)] 98.1  F (36.7  C)  Pulse:  [] 102  Resp:  [20-28] 23  BP: ()/() 154/95  SpO2:  [91 %-97 %] 96 %    General: AAOx4, NAD, lying comfortably in bed  CV: Tachycardic, well perfused   pulm: no dyspnea, breathing comfortably on 3 L nasal cannula  Abd: soft, mildly-distended, tender in the epigastrium and along anterior drain.  Drain with darker serosanguineous output.  Extremities: no edema  Skin: no rashes  Neuro: moving all extremities spontaneously without apparent deficit    Labs:  ABG No lab results found in last 7 days.  CBC No lab results found in last 7 days.  BMP No lab results found in last 7 days.  LFTs No lab results found in last 7 days.  Pancreas No lab results found in last 7 days.    Imaging:  IR Peritoneal Abscess Drainage    (Results Pending)

## 2022-10-27 NOTE — PROCEDURES
[facility]    Procedure: IR Procedure Note    Date/Time: 10/26/2022 8:01 PM  Performed by: Karina Hurtado DO  Authorized by: Julian Arce MD   IR Fellow Physician:  Radiology Resident Physician: Karina Hurtado DO        UNIVERSAL PROTOCOL   Site Marked: NA  Prior Images Obtained and Reviewed:  Yes  Required items: Required blood products, implants, devices and special equipment available    Patient identity confirmed:  Verbally with patient, arm band, provided demographic data and hospital-assigned identification number  Patient was reevaluated immediately before administering moderate or deep sedation or anesthesia  Confirmation Checklist:  Patient's identity using two indicators, relevant allergies, procedure was appropriate and matched the consent or emergent situation and correct equipment/implants were available  Time out: Immediately prior to the procedure a time out was called    Universal Protocol: the Joint Commission Universal Protocol was followed    Preparation: Patient was prepped and draped in usual sterile fashion    ESBL (mL):  2     ANESTHESIA    Anesthesia: Local infiltration  Local Anesthetic:  Lidocaine 1% without epinephrine  Anesthetic Total (mL):  10      SEDATION  Patient Sedated: Yes    Sedation Type:  Moderate (conscious) sedation  Sedation:  Fentanyl and midazolam  Vital signs: Vital signs monitored during sedation    See dictated procedure note for full details.  Findings: 12 Fr abdominal drain placed.    Specimens: none    Complications: None    Condition: Stable    Plan: -drain to gravity. Drain may not have much output.  -Can start flushing drain tomorrow.  -Drain cares.      PROCEDURE    Patient Tolerance:  Patient tolerated the procedure well with no immediate complications  Length of time physician/provider present for 1:1 monitoring during sedation: 60

## 2022-10-27 NOTE — PLAN OF CARE
Neuro: A&Ox4.   Cardiac: SR. High systolics, hydralazine added for systolics over 170  Respiratory: Sating>95% on 1L O2. SOB with ambulation.  GI/: Adequate urine output. No BM this shift  Diet/appetite: Tolerating clear liquid diet. Minimal appetite.  Activity:  Standby assist, up to chair and in halls.  Pain: Mild abdominal pain treated with PRN Dilaudid and prn oxy. At acceptable level on current regimen.   Skin: Abdominal drain in place, dressing CDI. No other new deficits noted.  LDA's: 1x R PIV, L abdominal drain, moderate output    Plan: Continue with POC. Notify primary team with changes.     Brian Clements RN on 10/27/2022 at 1:56 PM

## 2022-10-27 NOTE — PROGRESS NOTES
[facility]    Medicine Progress Note - Hospitalist Service, GOLD TEAM 6  Date of Admission: 10/26/2022    Assessment & Plan   Vernon Ortiz is a 62 year old male with history of hypothyroidism and HLD who was transferred from OSH to [facility] with acute necrotizing pancreatitis    Severe sepsis 2/2 a cute necrotizing pancreatitis: Presented to OSH with three days abdominal pain, N/V. CT AP acute pancreatitis with possible necrosis, focal complex fluid collection with gas at superior pancreatic body/tail junction c/f infection. At OSH, LA peak 4.2 (since normalized), WBC 12.8, febrile to 102. S/p urgent IR drain placement 10/26. Treated with Cefepime, Flagyl 10/26 to present. Fever resolved. WBC 12,     Etiology unclear: lipid panel 10/25 normal, minimal etoh use, no gallstones of biliary dilation on US 10/25, no trauma, no recent travel, no family hx. Of note, is on simvastatin PTA  - IR, GI, GS following  - Per GS note, no plans for surgical intervention at this time  - Obtain BCx x2  - Transition to Meropenem per d/w surgery  - Per GS: portion of the drain is wrapping around the splenic vein, will need guidance or visualization when the drain is removed to protect the vasculature   - CLD, decrease IVF to 150 ml/hr given hypoxia and patient's thirst  - Pain management: Tylenol and oxycodone prn with IV dilaudid for breakthrough   - Bowel regimen   - Family updated at beside  - PT, OT    Acute hypoxic respiratory failure: No BL O2 needs. Tx from OSH on 4L, weaned to 1L NC by writer while in the room with patient. Asymptomatic. No tachycardia. No recent echo. Etiology likely 2/2 sedating meds, atelectasis, possible OSH/JUNE, possible hypervolemia from IVF  - IVF as above  - IS, wean O2 as able  - Check BNP    Abnormal fasting glucose: Glucose 157 on am fasting labs. Check A1c    Hypothyroidism: TSH normal 9/9/22. Continue synthroid     HLD: Hold statin        Diet: NPO  for Medical/Clinical Reasons Except for: Meds    DVT Prophylaxis: Pneumatic Compression Devices  Hare Catheter: Not present  Central Lines: None  Cardiac Monitoring: None  Code Status: Full Code      Disposition Plan      Expected Discharge Date: 10/30/2022                The patient's care was discussed with the patient, family, nursing, GI, general surgery, and attending physician, Dr. Sandeep Corbin PASYBIL  Hospitalist Service, GOLD TEAM [address] [place] [address]  Securely message with the Vocera Web Console (learn more here)  Text page via [facility] Paging/Directory   Please see signed in provider for up to date coverage information      Clinically Significant Risk Factors Present on Admission              # Hypoalbuminemia: Lowest albumin = 2.9 g/dL (Ref range: 3.5-5.2) at 10/27/2022  6:00 AM, will monitor as appropriate                 ______________________________________________________________________    Interval History   Pain improved but continues with movement. No fever or chills. Tolerating drain ok. Passing gas, no BM. Etoh use is minimal. No recent travel. Denies dyspnea, chest pain, palpations, peripheral edema. No h/o JUNE    Data reviewed today: I reviewed all medications, new labs and imaging results over the last 24 hours    Physical Exam   Vital Signs: Temp: 98.4  F (36.9  C) Temp src: Oral BP: (!) 149/89 Pulse: 87   Resp: 22 SpO2: 95 % O2 Device: Nasal cannula Oxygen Delivery: 4 LPM  Weight: 237 lbs 14.02 oz  General Appearance: Alert and oriented x3, appears overall comfortable. Son, daughter, spouse bedside  HEENT: Anicteric sclera, MMM   Respiratory: Breathing comfortably on 1L, lungs CTAB without wheezing or crackles   Cardiovascular: RRR, S1, S2. No murmur noted  GI: Abdomen soft with mild diffuse tenderness. Anterior drain in L side of abdomen. No guarding or rebound. Bowel sounds active  Lymph/Hematologic: No peripheral edema, distal pulses  palpable   Skin: No rash or jaundice   Musculoskeletal: Moves all extremities   Neurologic: No focal deficits, CN II-XII grossly intact      Data   Recent Labs   Lab 10/27/22  0600   WBC 12.0*   HGB 14.1   MCV 90   *      POTASSIUM 4.6   CHLORIDE 106   CO2 25   BUN 23.3*   CR 0.99   ANIONGAP 8   DOLORES 7.9*   *   ALBUMIN 2.9*   PROTTOTAL 5.6*   BILITOTAL 0.9   ALKPHOS 47   ALT 42   AST 49     No results found for this or any previous visit (from the past 24 hour(s)).

## 2022-10-27 NOTE — CONSULTS
GASTROENTEROLOGY CONSULTATION      Date of Admission:  10/26/2022          ASSESSMENT AND RECOMMENDATIONS:     62 year old male with a history of hyperlipidemia and hypothyroidism was admitted on 10/26 from OSH for necrotizing pancreatitis with concern for infected necrosis.     #Acute necrotizing pancreatitis  #Infected necrotic collection s/p IR drain placement    CT imaging showing large, complex necrotic collection around 4 cm along the superior aspect of the pancreatic body/tail junction containing gas and fluid concerning for infection. Went directly to IR, as collection too immature to be drained by GI, and no retroperitoneal window to be candidate for VARD by Surgery. IR successfully placed drain to gravity. Fluid sample was sent for culture and amylase/lipase levels. Patient was then admitted to intermediate care unit for further evaluation. GI and Surgery also on board. No plan for VARD at this time from Surgery standpoint, collection would need to further mature prior to any planned surgical debridement.     Unclear etiology of pancreatitis. Could be in setting of microlithiasis/passed stone? No gallstones visualized on US abdomen, bilirubin normal (although elevated slightly at OSH? Passed stone?), no significant alcohol use, normal triglyceride levels, not on any medications associated with AP.              RECOMMENDATIONS    - Continue IV antibiotics with Gram negative/anaerobic coverage.  - Will follow-up pancreatic collection fluid culture results and amylase/lipase levels.  - Appreciate IR and Surgery recs.  - Continue IVF resuscitation.  - Pain management per primary team.  - No endoscopic interventions from GI standpoint at this time.     Gastroenterology follow up recommendations: TBD    Thank you for involving us in this patient's care. Please do not hesitate to contact the GI service with any questions or concerns.     Patient care plan discussed with Dr. Santos, GI staff  physician.    Sukumar Solorio MD  Gastroenterology Fellow  Pager             Chief Complaint:   We were asked by Dr. Hopkins of Medicine to evaluate this patient with Acute necrotizing pancreatitis.     History is obtained from the patient and the medical record.          History of Present Illness:   Vernon Ortiz is a 62 year old male with a history of hyperlipidemia and hypothyroidism was admitted today from OSH for necrotizing pancreatitis with concern for infected necrosis.     Patient presented to OSH ED with abdominal pain and distention, persistent vomiting, diaphoresis on 10/25. Symptoms started on 10/22. Was found to have Lipase 1300, normal LFTs. CT with acute pancreatitis and necrosis. Treated with IVF for MELY and lactic acidosis, required significant amount of IV fluids. Patient then spiked high fever 102+ on Tuesday 10/25, started on IV Cefepime and Flagyl. Team discussed with GI at [facility] over the phone for possible transportation and possible intervention.     Patient was transferred at [facility] in stable condition, had imaging from today showing large, necrotic collection containing gas and fluid concerning for infection. Went directly to IR, as collection too immature to be drained by GI, and no retroperitoneal window to be candidate for VARD by Surgery. IR successfully placed drain to gravity. Fluid sample was sent for culture and amylase/lipase levels. Patient was then admitted to intermediate care unit for further evaluation. GI and Surgery also on board.      Patient doing well this morning after procedure, still complains of abdominal pain which has now improved. Denies fever/chills, nausea/vomiting or other symptoms. Passing flatus.         Past Medical History:   Reviewed and edited as appropriate  No past medical history on file.         Past Surgical History:   Reviewed and edited as appropriate   No past surgical history on file.         Previous Endoscopy:   No results found for  this or any previous visit.         Social History:   Reviewed and edited as appropriate  Social History     Socioeconomic History     Marital status:      Spouse name: Not on file     Number of children: Not on file     Years of education: Not on file     Highest education level: Not on file   Occupational History     Not on file   Tobacco Use     Smoking status: Not on file     Smokeless tobacco: Not on file   Substance and Sexual Activity     Alcohol use: Not on file     Drug use: Not on file     Sexual activity: Not on file   Other Topics Concern     Not on file   Social History Narrative     Not on file     Social Determinants of Health     Financial Resource Strain: Not on file   Food Insecurity: Not on file   Transportation Needs: Not on file   Physical Activity: Not on file   Stress: Not on file   Social Connections: Not on file   Intimate Partner Violence: Not on file   Housing Stability: Not on file            Family History:   Reviewed and edited as appropriate  No known history of gastrointestinal/liver disease or  gastrointestinal malignancies       Allergies:   Reviewed and edited as appropriate   No Known Allergies         Medications:     Current Facility-Administered Medications   Medication     ceFEPIme (MAXIPIME) 2 g vial to attach to  ml bag for ADULTS or 50 ml bag for PEDS     HYDROmorphone (PF) (DILAUDID) injection 0.5 mg     lactated ringers infusion     lidocaine (LMX4) cream     lidocaine 1 % 0.1-1 mL     melatonin tablet 1 mg     metroNIDAZOLE (FLAGYL) infusion 500 mg     naloxone (NARCAN) injection 0.2 mg    Or     naloxone (NARCAN) injection 0.4 mg    Or     naloxone (NARCAN) injection 0.2 mg    Or     naloxone (NARCAN) injection 0.4 mg     polyethylene glycol (MIRALAX) Packet 17 g     prochlorperazine (COMPAZINE) injection 10 mg    Or     prochlorperazine (COMPAZINE) tablet 10 mg    Or     prochlorperazine (COMPAZINE) suppository 25 mg     senna-docusate  (SENOKOT-S/PERICOLACE) 8.6-50 MG per tablet 1 tablet    Or     senna-docusate (SENOKOT-S/PERICOLACE) 8.6-50 MG per tablet 2 tablet     sodium chloride (PF) 0.9% PF flush 10 mL     sodium chloride (PF) 0.9% PF flush 3 mL     sodium chloride (PF) 0.9% PF flush 3 mL             Review of Systems:     A complete review of systems was performed and is negative except as noted in the HPI           Physical Exam:   BP (!) 156/91 (BP Location: Right arm)   Pulse 99   Temp 99.6  F (37.6  C) (Oral)   Resp 24   SpO2 96%   Wt:   Wt Readings from Last 2 Encounters:   No data found for Wt      Constitutional: cooperative, pleasant, not dyspneic/diaphoretic, no acute distress  Eyes: Sclera anicteric/injected  Ears/nose/mouth/throat: Normal oropharynx without ulcers or exudate, mucus membranes moist  Neck: supple  CV: RRR, No edema  Respiratory: Unlabored breathing  Abdomen: No scars, drain in place, + distended, +bs, no hepatosplenomegaly, + diffuse tenderness to palpation, no peritoneal signs  Skin: warm, perfused, no jaundice  Neuro: AAO x 3, No asterixis  Psych: Normal affect  MSK: Normal gait         Data:   Labs and imaging below were independently reviewed and interpreted    BMPNo lab results found in last 7 days.  CBCNo lab results found in last 7 days.  INRNo lab results found in last 7 days.  LFTsNo lab results found in last 7 days.   PANCNo lab results found in last 7 days.    Imaging:     IR drain placement 10/26/2022    Seen and examined with GI fellow, agree with findings and recommendations.    Juan Santos MD GI Staff  Extended consult, came in to meet pt in IR before urgent percutaneous drainage of gas filled necrotic collection in body of pancreas. Very unusual as not much surrounding necrosis. And extremely early presentation.

## 2022-10-27 NOTE — PLAN OF CARE
Neuro: A&Ox4. Pleasant.   Cardiac: ST -110, HR 120s with activity. SBP 150s. Pt had a 26 beat run of SVT with HR of 177bpm at 4:37am, provider notified. EKG 12 lead done.   Respiratory: Sating >92% on 3-4 lpm NC. SOB with activity.   GI/: Adequate urine output via bedside urinal. No BM overnight. Abdomen distended, firm, non tender.   Diet/appetite: NPO.   Activity:  SBA at bedside to use urinal, HR up to 120s with activity.  Pain: At acceptable level on current regimen.   Skin: No new deficits noted.  LDA's: R PIV, LR @200mL/hr. Abdominal drain placed in IR last night (10/26), bloody/red output, irrigated per mar.     -Amylase and lipase culture collected in IR hemolyzed and unable to be cultured, provider notified.       Plan: Continue with POC. Notify primary team with changes.

## 2022-10-27 NOTE — PHARMACY-ADMISSION MEDICATION HISTORY
Admission Medication History Completed by Pharmacy    See [facility] Admission Navigator for allergy information, preferred outpatient pharmacy, prior to admission medications and immunization status.     Medication History Sources:     Patient, Care Everywhere    Changes made to PTA medication list (reason):    Added: Simvastatin, levothyroxine, APAP    Deleted: None    Changed: None    Additional Information:    None    Prior to Admission medications    Medication Sig Last Dose Taking? Auth Provider Long Term End Date   acetaminophen (TYLENOL) 500 MG tablet Take 500-1,000 mg by mouth every 6 hours as needed for mild pain  Yes Unknown, Entered By History     levothyroxine (SYNTHROID/LEVOTHROID) 100 MCG tablet Take 100 mcg by mouth daily  Yes Unknown, Entered By History Yes    simvastatin (ZOCOR) 40 MG tablet Take 40 mg by mouth At Bedtime  Yes Unknown, Entered By History Yes        Date completed: 10/27/22    Medication history completed by: Tsering Giang [facility]

## 2022-10-28 ENCOUNTER — APPOINTMENT (OUTPATIENT)
Dept: EDUCATION SERVICES | Facility: CLINIC | Age: 62
End: 2022-10-28
Attending: STUDENT IN AN ORGANIZED HEALTH CARE EDUCATION/TRAINING PROGRAM
Payer: COMMERCIAL

## 2022-10-28 ENCOUNTER — APPOINTMENT (OUTPATIENT)
Dept: OCCUPATIONAL THERAPY | Facility: CLINIC | Age: 62
End: 2022-10-28
Attending: PHYSICIAN ASSISTANT
Payer: COMMERCIAL

## 2022-10-28 LAB
ALBUMIN SERPL BCG-MCNC: 2.8 G/DL (ref 3.5–5.2)
ALP SERPL-CCNC: 48 U/L (ref 40–129)
ALT SERPL W P-5'-P-CCNC: 29 U/L (ref 10–50)
ANION GAP SERPL CALCULATED.3IONS-SCNC: 7 MMOL/L (ref 7–15)
AST SERPL W P-5'-P-CCNC: 30 U/L (ref 10–50)
BILIRUB SERPL-MCNC: 0.7 MG/DL
BUN SERPL-MCNC: 18.1 MG/DL (ref 8–23)
CALCIUM SERPL-MCNC: 7.9 MG/DL (ref 8.8–10.2)
CHLORIDE SERPL-SCNC: 106 MMOL/L (ref 98–107)
CREAT SERPL-MCNC: 0.93 MG/DL (ref 0.67–1.17)
CRP SERPL-MCNC: 248 MG/L
DEPRECATED HCO3 PLAS-SCNC: 26 MMOL/L (ref 22–29)
ERYTHROCYTE [DISTWIDTH] IN BLOOD BY AUTOMATED COUNT: 14 % (ref 10–15)
GFR SERPL CREATININE-BSD FRML MDRD: >90 ML/MIN/1.73M2
GLUCOSE SERPL-MCNC: 131 MG/DL (ref 70–99)
HCT VFR BLD AUTO: 40.8 % (ref 40–53)
HGB BLD-MCNC: 13.4 G/DL (ref 13.3–17.7)
MCH RBC QN AUTO: 30.1 PG (ref 26.5–33)
MCHC RBC AUTO-ENTMCNC: 32.8 G/DL (ref 31.5–36.5)
MCV RBC AUTO: 92 FL (ref 78–100)
PHOSPHATE SERPL-MCNC: 1.2 MG/DL (ref 2.5–4.5)
PHOSPHATE SERPL-MCNC: 1.8 MG/DL (ref 2.5–4.5)
PLATELET # BLD AUTO: 147 10E3/UL (ref 150–450)
POTASSIUM SERPL-SCNC: 4.5 MMOL/L (ref 3.4–5.3)
PROT SERPL-MCNC: 5.7 G/DL (ref 6.4–8.3)
RBC # BLD AUTO: 4.45 10E6/UL (ref 4.4–5.9)
SODIUM SERPL-SCNC: 139 MMOL/L (ref 136–145)
WBC # BLD AUTO: 12.3 10E3/UL (ref 4–11)

## 2022-10-28 PROCEDURE — 36415 COLL VENOUS BLD VENIPUNCTURE: CPT | Performed by: PHYSICIAN ASSISTANT

## 2022-10-28 PROCEDURE — 120N000003 HC R&B IMCU UMMC

## 2022-10-28 PROCEDURE — 250N000009 HC RX 250: Performed by: PHYSICIAN ASSISTANT

## 2022-10-28 PROCEDURE — 999N000147 HC STATISTIC PT IP EVAL DEFER

## 2022-10-28 PROCEDURE — 97165 OT EVAL LOW COMPLEX 30 MIN: CPT | Mod: GO

## 2022-10-28 PROCEDURE — 258N000003 HC RX IP 258 OP 636: Performed by: PHYSICIAN ASSISTANT

## 2022-10-28 PROCEDURE — 86140 C-REACTIVE PROTEIN: CPT | Performed by: PHYSICIAN ASSISTANT

## 2022-10-28 PROCEDURE — 99233 SBSQ HOSP IP/OBS HIGH 50: CPT | Performed by: INTERNAL MEDICINE

## 2022-10-28 PROCEDURE — 85027 COMPLETE CBC AUTOMATED: CPT | Performed by: PHYSICIAN ASSISTANT

## 2022-10-28 PROCEDURE — 97530 THERAPEUTIC ACTIVITIES: CPT | Mod: GO

## 2022-10-28 PROCEDURE — 99207 PR APP CREDIT; MD BILLING SHARED VISIT: CPT | Performed by: PHYSICIAN ASSISTANT

## 2022-10-28 PROCEDURE — 258N000003 HC RX IP 258 OP 636: Performed by: INTERNAL MEDICINE

## 2022-10-28 PROCEDURE — 999N000128 HC STATISTIC PERIPHERAL IV START W/O US GUIDANCE

## 2022-10-28 PROCEDURE — 36415 COLL VENOUS BLD VENIPUNCTURE: CPT | Performed by: INTERNAL MEDICINE

## 2022-10-28 PROCEDURE — 84100 ASSAY OF PHOSPHORUS: CPT | Performed by: INTERNAL MEDICINE

## 2022-10-28 PROCEDURE — 84100 ASSAY OF PHOSPHORUS: CPT | Performed by: STUDENT IN AN ORGANIZED HEALTH CARE EDUCATION/TRAINING PROGRAM

## 2022-10-28 PROCEDURE — 82040 ASSAY OF SERUM ALBUMIN: CPT | Performed by: PHYSICIAN ASSISTANT

## 2022-10-28 PROCEDURE — 250N000013 HC RX MED GY IP 250 OP 250 PS 637: Performed by: PHYSICIAN ASSISTANT

## 2022-10-28 PROCEDURE — 97535 SELF CARE MNGMENT TRAINING: CPT | Mod: GO

## 2022-10-28 PROCEDURE — 250N000011 HC RX IP 250 OP 636: Performed by: PHYSICIAN ASSISTANT

## 2022-10-28 PROCEDURE — 80053 COMPREHEN METABOLIC PANEL: CPT | Performed by: PHYSICIAN ASSISTANT

## 2022-10-28 PROCEDURE — 250N000009 HC RX 250: Performed by: INTERNAL MEDICINE

## 2022-10-28 RX ADMIN — POTASSIUM PHOSPHATE, MONOBASIC AND POTASSIUM PHOSPHATE, DIBASIC 15 MMOL: 224; 236 INJECTION, SOLUTION, CONCENTRATE INTRAVENOUS at 13:56

## 2022-10-28 RX ADMIN — POLYETHYLENE GLYCOL 3350 17 G: 17 POWDER, FOR SOLUTION ORAL at 08:09

## 2022-10-28 RX ADMIN — SODIUM CHLORIDE, POTASSIUM CHLORIDE, SODIUM LACTATE AND CALCIUM CHLORIDE: 600; 310; 30; 20 INJECTION, SOLUTION INTRAVENOUS at 22:05

## 2022-10-28 RX ADMIN — HYDROMORPHONE HYDROCHLORIDE 2 MG: 2 TABLET ORAL at 08:44

## 2022-10-28 RX ADMIN — ACETAMINOPHEN 650 MG: 325 TABLET, FILM COATED ORAL at 11:34

## 2022-10-28 RX ADMIN — MEROPENEM 1 G: 1 INJECTION, POWDER, FOR SOLUTION INTRAVENOUS at 19:55

## 2022-10-28 RX ADMIN — HYDROMORPHONE HYDROCHLORIDE 2 MG: 2 TABLET ORAL at 01:10

## 2022-10-28 RX ADMIN — MEROPENEM 1 G: 1 INJECTION, POWDER, FOR SOLUTION INTRAVENOUS at 11:09

## 2022-10-28 RX ADMIN — HYDROMORPHONE HYDROCHLORIDE 0.5 MG: 1 INJECTION, SOLUTION INTRAMUSCULAR; INTRAVENOUS; SUBCUTANEOUS at 22:47

## 2022-10-28 RX ADMIN — SODIUM CHLORIDE, POTASSIUM CHLORIDE, SODIUM LACTATE AND CALCIUM CHLORIDE: 600; 310; 30; 20 INJECTION, SOLUTION INTRAVENOUS at 08:19

## 2022-10-28 RX ADMIN — POTASSIUM PHOSPHATE, MONOBASIC AND POTASSIUM PHOSPHATE, DIBASIC 15 MMOL: 224; 236 INJECTION, SOLUTION, CONCENTRATE INTRAVENOUS at 11:35

## 2022-10-28 RX ADMIN — SODIUM PHOSPHATE, MONOBASIC, MONOHYDRATE AND SODIUM PHOSPHATE, DIBASIC, ANHYDROUS 15 MMOL: 276; 142 INJECTION, SOLUTION INTRAVENOUS at 22:10

## 2022-10-28 RX ADMIN — HYDROMORPHONE HYDROCHLORIDE 2 MG: 2 TABLET ORAL at 18:37

## 2022-10-28 RX ADMIN — HYDROMORPHONE HYDROCHLORIDE 2 MG: 2 TABLET ORAL at 13:55

## 2022-10-28 RX ADMIN — LEVOTHYROXINE SODIUM 100 MCG: 100 TABLET ORAL at 08:09

## 2022-10-28 RX ADMIN — MEROPENEM 1 G: 1 INJECTION, POWDER, FOR SOLUTION INTRAVENOUS at 03:02

## 2022-10-28 RX ADMIN — SENNOSIDES AND DOCUSATE SODIUM 1 TABLET: 8.6; 5 TABLET ORAL at 08:09

## 2022-10-28 ASSESSMENT — ACTIVITIES OF DAILY LIVING (ADL)
ADLS_ACUITY_SCORE: 27

## 2022-10-28 NOTE — CONSULTS
10/28/22 1546 Felicity Grier, RN     Patient and spouse seen at bedside for drain care education. Wife RD on a model with site care and anchoring tube. Wife RD on pt with flushing tube with 10ml of Sterile NS. Tube patent. Discussed how to care for tube, when to call care team and how to empty and record drainage. Literature given: Handwashing and Skin Care, Drainage Tube Home Care Instructions, and Flushing Your Drain with Saline.

## 2022-10-28 NOTE — PLAN OF CARE
Neuro: A&Ox4.   Cardiac: SR - HR 70-90s. -150s. Afebrile.    Respiratory: Sating >92% on 2L NC. Intermittent upper airway wheezing. OLIVERA. Accessory muscle use.    GI/: Adequate urine output via urinal. No BM - scheduled stool softeners given. Pt states he is passing gas. Abdomen tender/distended.   Diet/appetite: Tolerating full liquid diet. Denies N/V.  Activity:  Stand by assistance, up to chair and in halls.  Pain: Managed with prn tylenol and PO dilaudid.   Skin: No new deficits noted.  LDA's: L abdominal drain w/ bright red/bloody output. PIV x2 - LR infusing at 75 mL/hr.     Plan: Replace phos per protocol. Continue with POC. Notify primary team with changes.     Goal Outcome Evaluation:      Plan of Care Reviewed With: patient    Overall Patient Progress: improvingOverall Patient Progress: improving    Outcome Evaluation: Pt ambulated in [place] a few times today. Drain education with PLC.      Problem: Plan of Care - These are the overarching goals to be used throughout the patient stay.    Goal: Optimal Comfort and Wellbeing  Intervention: Monitor Pain and Promote Comfort  Recent Flowsheet Documentation  Taken 10/28/2022 1537 by Shannon Lo, RN  Pain Management Interventions: declines  Taken 10/28/2022 1352 by Shannon Lo, RN  Pain Management Interventions: medication (see MAR)  Taken 10/28/2022 0813 by Shannon Lo, RN  Pain Management Interventions: declines

## 2022-10-28 NOTE — PROGRESS NOTES
Brief Medicine Note:    Patient feeling some shortness of breath this evening and RN noting some wheezing. May be fluid related but hesitant to decrease fluids unless necessary given nec panc.    - Trial albuterol neb  - If no improvement, will decrease fluid rate    ELLIE FloydC  Internal Medicine JAMES Hospitalist  [facility]    Addendum: decreased IVF to 75 mL/hr as RT assessed and felt neb unlikely to be beneficial.

## 2022-10-28 NOTE — PROGRESS NOTES
[facility]    Medicine Progress Note - Hospitalist Service, GOLD TEAM 6  Date of Admission: 10/26/2022    Assessment & Plan   Vernon Ortiz is a 62 year old male with history of hypothyroidism and HLD who was transferred from OSH to [facility] with acute necrotizing pancreatitis    Severe sepsis 2/2 a cute necrotizing pancreatitis: Presented to OSH with three days abdominal pain, N/V. CT AP acute pancreatitis with possible necrosis, focal complex fluid collection with gas at superior pancreatic body/tail junction c/f infection. At OSH, LA peak 4.2 (normalized), WBC 12.8, febrile to 102. S/p urgent IR drain placement on admission 10/26. Treated with Cefepime, Flagyl 10/26 to 10/27, Meropenem 10/27 to present. Fever resolved. WBC 12.3 (12),  (262), LFTs normal. Fluid culture from 10/26 +for E coli. BCx NGTD    Etiology unclear: lipid panel 10/25 normal, minimal etoh use, no gallstones of biliary dilation on US 10/25, no trauma, no recent travel, no family hx. Of note, is on simvastatin PTA  - IR, GI, GS following  - Per GS, no plans for surgical intervention at this time  - Follow cultures   - Continue Meropenem   - Per GS: portion of the drain is wrapping around the splenic vein, will need guidance or visualization when the drain is removed to protect the vasculature   - Advance to FLD. Continue IVF at decreased rate 75 ml/hr for now  - Pain management: Tylenol and PO hydromorphone prn with IV dilaudid for breakthrough   - Bowel regimen   - Family updated at beside  - PT, OT  - Transfer off 6B    Acute hypoxic respiratory failure, improving: No BL O2 needs. Tx from OSH on 4L. . Non-smoker. Asymptomatic. No tachycardia. Mild hypervolemia on bedside US performed by Dr. Hopkins 10/28. Etiology likely 2/2 atelectasis, possible OSH/JUNE, mild hypervolemia. O2 sats 88-94% on 1L NC, improved to 90s on 1L 10/28  - IVF as above  - IS, wean O2 as able  - Encourage ambulation      Hypophosphatemia: Phos 1.2, replace per protocol     Thrombocytopenia: Minimal, plts 147. Monitor       Other Medical Issues:  Abnormal fasting glucose: Glucose 157 on am fasting labs 10/27. A1c normal   Hypothyroidism: TSH normal 9/9/22. Continue synthroid   HLD: Hold statin        Diet: Full Liquid Diet    DVT Prophylaxis: Pneumatic Compression Devices  Hare Catheter: Not present  Central Lines: None  Cardiac Monitoring: None  Code Status: Full Code      Disposition Plan         The patient's care was discussed with the patient, family, nursing, and attending physician, Dr. Sandeep Corbin PAKierraC  Hospitalist Service, GOLD TEAM [address] [place] [address]  Securely message with the Vocera Web Console (learn more here)  Text page via [facility] Paging/Directory   Please see signed in provider for up to date coverage information      Clinically Significant Risk Factors              # Hypoalbuminemia: Lowest albumin = 2.8 g/dL (Ref range: 3.5-5.2) at 10/28/2022  5:47 AM, will monitor as appropriate           # Obesity: Estimated body mass index is 32.56 kg/m  as calculated from the following:    Height as of this encounter: 1.829 m (6').    Weight as of this encounter: 108.9 kg (240 lb 1.3 oz)., PRESENT ON ADMISSION         ______________________________________________________________________    Interval History   Pain slightly improved but better than prior. Tolerating CLD without increased pain, nausea, vomiting. No further fever or chills. Passing gas but no BM yet. No urinary symptoms. No confusion. No chest pain or dyspnea. Denies confusion. Per spouse has already been up to walk the unit today    Data reviewed today: I reviewed all medications, new labs and imaging results over the last 24 hours    Physical Exam   Vital Signs: Temp: 98  F (36.7  C) Temp src: Oral BP: (!) 140/75 Pulse: 72   Resp: 16 SpO2: 98 % O2 Device: Nasal cannula Oxygen Delivery: 2 LPM  Weight:  240 lbs 1.3 oz  General Appearance: Alert and oriented x3, appears overall comfortable. Wife bedside  HEENT: Anicteric sclera, MMM   Respiratory: Breathing comfortably on 1L. Faint crackles in the bases   Cardiovascular: RRR, S1, S2. No murmur noted  GI: Abdomen soft with mild diffuse tenderness. Anterior drain in L side of abdomen. No guarding or rebound. Bowel sounds active  Lymph/Hematologic: No peripheral edema, distal pulses palpable   Skin: No rash or jaundice   Musculoskeletal: Moves all extremities   Neurologic: No focal deficits, CN II-XII grossly intact      Data   Recent Labs   Lab 10/28/22  0547 10/27/22  0600   WBC 12.3* 12.0*   HGB 13.4 14.1   MCV 92 90   * 134*    139   POTASSIUM 4.5 4.6   CHLORIDE 106 106   CO2 26 25   BUN 18.1 23.3*   CR 0.93 0.99   ANIONGAP 7 8   DOLORES 7.9* 7.9*   * 157*   ALBUMIN 2.8* 2.9*   PROTTOTAL 5.7* 5.6*   BILITOTAL 0.7 0.9   ALKPHOS 48 47   ALT 29 42   AST 30 49     Recent Results (from the past 24 hour(s))   XR Chest Port 1 View    Narrative    EXAM: XR CHEST PORT 1 VIEW  10/27/2022 10:10 PM     HISTORY:  increasing O2 needs, concern for pulmonary edema       COMPARISON:  10/25/2022    TECHNIQUE: Portable AP upright view of the chest    FINDINGS:   The trachea is midline. The cardiomediastinal silhouette is within  normal limits with silhouetting of the left hemidiaphragm.. Low lung  volumes. No appreciable pneumothorax. Small bilateral pleural  effusions. Streaky perihilar and bibasilar opacities. No focal  airspace opacity. No acute osseous abnormality.      Impression    IMPRESSION:  1. Low lung volumes with streaky perihilar, retrocardiac, and  bibasilar opacities representing atelectasis versus edema.  2. Small bilateral pleural effusions.    I have personally reviewed the examination and initial interpretation  and I agree with the findings.    TYRESE GARCIA MD         SYSTEM ID:  [id number]

## 2022-10-28 NOTE — PLAN OF CARE
Neuro: A&Ox4. Afebrile.  Cardiac: SR. VSS. Bps 130s-140s/90s. Denies chest pain.  Respiratory: Sating mid to low 90s on 2 L NC at rest. Pt dips to mid to low 80s on 2 L with movement. Pt feels a bit SOB. Upper airway wheezes. Provider notified. LR rate dropped to 75 ml/h.  GI/: Adequate urine output. No BM overnight.  Diet/appetite: Clear liquids.   Activity: SBA.   Pain: At acceptable level on current regimen. PRN PO Dilaudid and Tylenol given x2 for abdominal pain (pressure).  Skin: No new deficits noted.  LDA's: PIVx2. Abdominal drain with small amount of bright red output.

## 2022-10-28 NOTE — PLAN OF CARE
6B Physical Therapy: Orders received. Chart reviewed and discussed with care team.? Physical Therapy not indicated due to no acute PT needs.? Per OT, pt mobilizing well, all needs met by single discipline. OT to follow. Defer discharge recommendations to OT.? Will complete orders.

## 2022-10-28 NOTE — PROGRESS NOTES
10/28/22 1000   Appointment Info   Signing Clinician's Name / Credentials (OT) Jeff Sullivan, OTR/L   Living Environment   People in Home spouse   Current Living Arrangements house   Home Accessibility stairs to enter home;stairs within home   Number of Stairs, Main Entrance 3   Stair Railings, Main Entrance railing on right side (ascending)   Number of Stairs, Within Home, Primary greater than 10 stairs   Stair Railings, Within Home, Primary railing on left side (ascending)   Transportation Anticipated car, drives self   Living Environment Comments Pt has a walk in shower.   Self-Care   Usual Activity Tolerance excellent   Current Activity Tolerance good   Regular Exercise Yes   Activity/Exercise Type strength training;walking   Exercise Amount/Frequency 45 mins;3-5 times/wk   Equipment Currently Used at Home none   Fall history within last six months no   General Information   Onset of Illness/Injury or Date of Surgery 10/26/22   Referring Physician Shira Corbin PA-C   Patient/Family Therapy Goal Statement (OT) Pt would like to be infection free and return home independently.   Additional Occupational Profile Info/Pertinent History of Current Problem Vernon Ortiz is a 62 year old male with a history of hyperlipidemia and hypothyroidism was admitted on 10/26 from OSH for necrotizing pancreatitis with concern for infected necrosis. Now s/p IR drain placement.   Existing Precautions/Restrictions fall   Left Upper Extremity (Weight-bearing Status) full weight-bearing (FWB)   Right Upper Extremity (Weight-bearing Status) full weight-bearing (FWB)   Left Lower Extremity (Weight-bearing Status) full weight-bearing (FWB)   Right Lower Extremity (Weight-bearing Status) full weight-bearing (FWB)   Cognitive Status Examination   Orientation Status orientation to person, place and time   Affect/Mental Status (Cognitive) WFL   Visual Perception   Visual Impairment/Limitations corrective lenses full-time   Sensory    Sensory Quick Adds sensation intact   Pain Assessment   Patient Currently in Pain Yes, see Vital Sign flowsheet   Range of Motion Comprehensive   Comment, General Range of Motion BUE AROM WFL.   Strength Comprehensive (MMT)   Comment, General Manual Muscle Testing (MMT) Assessment Pt demonstrates generalized weakness.   Bed Mobility   Comment (Bed Mobility) Min A and vc's   Transfers   Transfer Comments CGA and vc's.   Activities of Daily Living   BADL Assessment/Intervention lower body dressing;bathing   Bathing Assessment/Intervention   [place] Level (Bathing) set up;verbal cues;minimum assist (75% patient effort)   Lower Body Dressing Assessment/Training   [place] Level (Lower Body Dressing) set up;verbal cues;minimum assist (75% patient effort)   Clinical Impression   Criteria for Skilled Therapeutic Interventions Met (OT) Yes, treatment indicated   OT Diagnosis Decreased independence with functional transfers and ADLS.   OT Problem List-Impairments impacting ADL problems related to;activity tolerance impaired;flexibility;mobility;pain;strength;post-surgical precautions   Assessment of Occupational Performance 1-3 Performance Deficits   Identified Performance Deficits Decreased independence and tolerance for ADLS/transfers.   Planned Therapy Interventions (OT) ADL retraining;IADL retraining;bed mobility training;ROM;strengthening;stretching;transfer training;home program guidelines;progressive activity/exercise   Clinical Decision Making Complexity (OT) low complexity   Risk & Benefits of therapy have been explained evaluation/treatment results reviewed;care plan/treatment goals reviewed;patient;spouse/significant other   OT Total Evaluation Time   OT Eval, Low Complexity Minutes (17767) 10   OT Goals   Therapy Frequency (OT) 5 times/wk   OT Predicted Duration/Target Date for Goal Attainment 11/04/22   OT Goals Lower Body Dressing;Hygiene/Grooming;Transfers;Toilet Transfer/Toileting;OT Goal 1   OT:  Hygiene/Grooming independent;within precautions;while standing   OT: Lower Body Dressing Independent;within precautions   OT: Transfer Independent;within precautions   OT: Toilet Transfer/Toileting Independent;toilet transfer;cleaning and garment management;within precautions   OT: Goal 1 Pt will demonstrate independence with performance of 12 stairs in prep for home/community mobility.   OT Discharge Planning   OT Plan OT: Ambulation, Stairs, Shower task.   OT Discharge Recommendation (DC Rec) home with assist   OT Rationale for DC Rec Pt is mobilizing well though below baseline function. Pt will benefit from therapy while IP to maximize function prior to return home.   OT Brief overview of current status CGA   Total Session Time   Total Session Time (sum of timed and untimed services) 10

## 2022-10-28 NOTE — PROGRESS NOTES
Gastroenterology Follow up Note         Assessment and Plan:   Vernon Ortiz is a 62 year old male with a history of hyperlipidemia and hypothyroidism was admitted on 10/26 from OSH for necrotizing pancreatitis with concern for infected necrosis. Now s/p IR drain placement.      #Acute necrotizing pancreatitis  #Infected necrotic collection s/p IR drain placement     CT imaging showing large, complex necrotic collection around 4 cm along the superior aspect of the pancreatic body/tail junction containing gas and fluid concerning for infection. Went directly to IR, as collection too immature to be drained by GI, and no retroperitoneal window to be candidate for VARD by Surgery. IR successfully placed drain to gravity. Fluid sample was sent for culture and amylase/lipase levels. Patient was then admitted to intermediate care unit for further evaluation. GI and Surgery also on board. No plan for VARD at this time from Surgery standpoint, collection would need to further mature prior to any planned surgical debridement.      Unclear etiology of pancreatitis. Could be in setting of microlithiasis/passed stone? No gallstones visualized on US abdomen, bilirubin normal (although elevated slightly at OSH? Passed stone?), no significant alcohol use, normal triglyceride levels, not on any medications associated with AP.    Patient is doing well, tolerating PO diet, pain under control with pain meds, no fever/chills.             Recommendations:     - Continue IV antibiotics with Gram negative/anaerobic coverage.  - Appreciate IR and Surgery recs.  - Continue IVF resuscitation.  - Advance diet as tolerated.   - Pain management per primary team.  - Will need repeat CT prior to discharge to evaluate necrotic collection.   - No endoscopic interventions from GI standpoint at this time.     It has been a pleasure to participate in the care of this patient.  Patient discussed with GI staff, Dr. Santos.  Please do not hesitate to  contact the GI service with any questions or concerns.     Sukumar Solorio MD  Gastroenterology Fellow  Pager [phone]         Subjective/Objective:   - No acute events overnight         Medications:     Current Facility-Administered Medications   Medication     acetaminophen (TYLENOL) tablet 650 mg     hydrALAZINE (APRESOLINE) injection 10 mg     HYDROmorphone (DILAUDID) tablet 2 mg     HYDROmorphone (PF) (DILAUDID) injection 0.3-0.5 mg     lactated ringers infusion     levothyroxine (SYNTHROID/LEVOTHROID) tablet 100 mcg     lidocaine (LMX4) cream     lidocaine 1 % 0.1-1 mL     melatonin tablet 1 mg     meropenem (MERREM) 1 g vial to attach to  mL bag     naloxone (NARCAN) injection 0.2 mg    Or     naloxone (NARCAN) injection 0.4 mg    Or     naloxone (NARCAN) injection 0.2 mg    Or     naloxone (NARCAN) injection 0.4 mg     polyethylene glycol (MIRALAX) Packet 17 g     polyethylene glycol (MIRALAX) Packet 17 g     potassium phosphate 15 mmol in D5W 250 mL intermittent infusion     prochlorperazine (COMPAZINE) injection 10 mg    Or     prochlorperazine (COMPAZINE) tablet 10 mg    Or     prochlorperazine (COMPAZINE) suppository 25 mg     senna-docusate (SENOKOT-S/PERICOLACE) 8.6-50 MG per tablet 1 tablet     senna-docusate (SENOKOT-S/PERICOLACE) 8.6-50 MG per tablet 1 tablet    Or     senna-docusate (SENOKOT-S/PERICOLACE) 8.6-50 MG per tablet 2 tablet     sodium chloride (PF) 0.9% PF flush 10 mL     sodium chloride (PF) 0.9% PF flush 3 mL     sodium chloride (PF) 0.9% PF flush 3 mL            Physical Exam:   VS:  BP (!) 154/104 (BP Location: Left arm)   Pulse 90   Temp 98.3  F (36.8  C) (Oral)   Resp 20   Ht 1.829 m (6')   Wt 108.9 kg (240 lb 1.3 oz)   SpO2 94%   BMI 32.56 kg/m      Wt:   Wt Readings from Last 2 Encounters:   10/28/22 108.9 kg (240 lb 1.3 oz)      Constitutional: cooperative, pleasant, not dyspneic/diaphoretic, no acute distress  Eyes: Sclera  anicteric/injected  Ears/nose/mouth/throat: Normal oropharynx without ulcers or exudate, mucus membranes moist  Neck: supple  CV: RRR, No edema  Respiratory: Unlabored breathing  Abdomen: No scars, drain in place, + distended, +bs, no hepatosplenomegaly, + mild epigastric tenderness to palpation, no peritoneal signs  Skin: warm, perfused, no jaundice  Neuro: AAO x 3, No asterixis  Psych: Normal affect  MSK: Normal gait         Laboratory:   BMP  Recent Labs   Lab 10/28/22  0547 10/27/22  0600    139   POTASSIUM 4.5 4.6   CHLORIDE 106 106   DOLORES 7.9* 7.9*   CO2 26 25   BUN 18.1 23.3*   CR 0.93 0.99   * 157*     CBC  Recent Labs   Lab 10/28/22  0547 10/27/22  0600   WBC 12.3* 12.0*   RBC 4.45 4.70   HGB 13.4 14.1   HCT 40.8 42.4   MCV 92 90   MCH 30.1 30.0   MCHC 32.8 33.3   RDW 14.0 13.6   * 134*     INRNo lab results found in last 7 days.  LFTs  Recent Labs   Lab 10/28/22  0547 10/27/22  0600   ALKPHOS 48 47   AST 30 49   ALT 29 42   BILITOTAL 0.7 0.9   PROTTOTAL 5.7* 5.6*   ALBUMIN 2.8* 2.9*      PANCNo lab results found in last 7 days.         Imaging/Procedures/Studies:   No results found for this or any previous visit.   No new imaging or procedures in the last 24 hours

## 2022-10-29 LAB
ALBUMIN SERPL BCG-MCNC: 2.8 G/DL (ref 3.5–5.2)
ALP SERPL-CCNC: 64 U/L (ref 40–129)
ALT SERPL W P-5'-P-CCNC: 22 U/L (ref 10–50)
ANION GAP SERPL CALCULATED.3IONS-SCNC: 11 MMOL/L (ref 7–15)
ANION GAP SERPL CALCULATED.3IONS-SCNC: 8 MMOL/L (ref 7–15)
AST SERPL W P-5'-P-CCNC: 25 U/L (ref 10–50)
BILIRUB SERPL-MCNC: 1 MG/DL
BUN SERPL-MCNC: 14.5 MG/DL (ref 8–23)
BUN SERPL-MCNC: 14.6 MG/DL (ref 8–23)
CALCIUM SERPL-MCNC: 7.5 MG/DL (ref 8.8–10.2)
CALCIUM SERPL-MCNC: 7.7 MG/DL (ref 8.8–10.2)
CHLORIDE SERPL-SCNC: 94 MMOL/L (ref 98–107)
CHLORIDE SERPL-SCNC: 99 MMOL/L (ref 98–107)
CREAT SERPL-MCNC: 0.86 MG/DL (ref 0.67–1.17)
CREAT SERPL-MCNC: 0.87 MG/DL (ref 0.67–1.17)
CRP SERPL-MCNC: 216 MG/L
DEPRECATED HCO3 PLAS-SCNC: 22 MMOL/L (ref 22–29)
DEPRECATED HCO3 PLAS-SCNC: 25 MMOL/L (ref 22–29)
ERYTHROCYTE [DISTWIDTH] IN BLOOD BY AUTOMATED COUNT: 13.9 % (ref 10–15)
GFR SERPL CREATININE-BSD FRML MDRD: >90 ML/MIN/1.73M2
GFR SERPL CREATININE-BSD FRML MDRD: >90 ML/MIN/1.73M2
GLUCOSE SERPL-MCNC: 175 MG/DL (ref 70–99)
GLUCOSE SERPL-MCNC: 176 MG/DL (ref 70–99)
HCT VFR BLD AUTO: 40.9 % (ref 40–53)
HGB BLD-MCNC: 13.4 G/DL (ref 13.3–17.7)
MCH RBC QN AUTO: 30 PG (ref 26.5–33)
MCHC RBC AUTO-ENTMCNC: 32.8 G/DL (ref 31.5–36.5)
MCV RBC AUTO: 92 FL (ref 78–100)
PHOSPHATE SERPL-MCNC: 1.7 MG/DL (ref 2.5–4.5)
PHOSPHATE SERPL-MCNC: 2 MG/DL (ref 2.5–4.5)
PLATELET # BLD AUTO: 166 10E3/UL (ref 150–450)
POTASSIUM SERPL-SCNC: 4 MMOL/L (ref 3.4–5.3)
POTASSIUM SERPL-SCNC: 4.4 MMOL/L (ref 3.4–5.3)
PROT SERPL-MCNC: 5.7 G/DL (ref 6.4–8.3)
RBC # BLD AUTO: 4.46 10E6/UL (ref 4.4–5.9)
SODIUM SERPL-SCNC: 127 MMOL/L (ref 136–145)
SODIUM SERPL-SCNC: 132 MMOL/L (ref 136–145)
WBC # BLD AUTO: 14.4 10E3/UL (ref 4–11)

## 2022-10-29 PROCEDURE — 120N000003 HC R&B IMCU UMMC

## 2022-10-29 PROCEDURE — 80053 COMPREHEN METABOLIC PANEL: CPT | Performed by: PHYSICIAN ASSISTANT

## 2022-10-29 PROCEDURE — 250N000013 HC RX MED GY IP 250 OP 250 PS 637

## 2022-10-29 PROCEDURE — 99207 PR APP CREDIT; MD BILLING SHARED VISIT: CPT | Performed by: PHYSICIAN ASSISTANT

## 2022-10-29 PROCEDURE — 84100 ASSAY OF PHOSPHORUS: CPT | Performed by: INTERNAL MEDICINE

## 2022-10-29 PROCEDURE — 250N000013 HC RX MED GY IP 250 OP 250 PS 637: Performed by: PHYSICIAN ASSISTANT

## 2022-10-29 PROCEDURE — 99233 SBSQ HOSP IP/OBS HIGH 50: CPT | Performed by: STUDENT IN AN ORGANIZED HEALTH CARE EDUCATION/TRAINING PROGRAM

## 2022-10-29 PROCEDURE — 84100 ASSAY OF PHOSPHORUS: CPT | Performed by: PHYSICIAN ASSISTANT

## 2022-10-29 PROCEDURE — 250N000009 HC RX 250: Performed by: PHYSICIAN ASSISTANT

## 2022-10-29 PROCEDURE — 258N000003 HC RX IP 258 OP 636: Performed by: INTERNAL MEDICINE

## 2022-10-29 PROCEDURE — 258N000003 HC RX IP 258 OP 636: Performed by: PHYSICIAN ASSISTANT

## 2022-10-29 PROCEDURE — 250N000011 HC RX IP 250 OP 636: Performed by: PHYSICIAN ASSISTANT

## 2022-10-29 PROCEDURE — 36415 COLL VENOUS BLD VENIPUNCTURE: CPT | Performed by: INTERNAL MEDICINE

## 2022-10-29 PROCEDURE — 36415 COLL VENOUS BLD VENIPUNCTURE: CPT | Performed by: PHYSICIAN ASSISTANT

## 2022-10-29 PROCEDURE — 86140 C-REACTIVE PROTEIN: CPT | Performed by: PHYSICIAN ASSISTANT

## 2022-10-29 PROCEDURE — 250N000009 HC RX 250: Performed by: INTERNAL MEDICINE

## 2022-10-29 PROCEDURE — 85027 COMPLETE CBC AUTOMATED: CPT | Performed by: PHYSICIAN ASSISTANT

## 2022-10-29 RX ORDER — METRONIDAZOLE 500 MG/1
500 TABLET ORAL 3 TIMES DAILY
Status: DISCONTINUED | OUTPATIENT
Start: 2022-10-29 | End: 2022-11-06

## 2022-10-29 RX ORDER — IPRATROPIUM BROMIDE AND ALBUTEROL SULFATE 2.5; .5 MG/3ML; MG/3ML
3 SOLUTION RESPIRATORY (INHALATION) EVERY 4 HOURS PRN
Status: DISCONTINUED | OUTPATIENT
Start: 2022-10-29 | End: 2022-11-07 | Stop reason: HOSPADM

## 2022-10-29 RX ORDER — CIPROFLOXACIN 500 MG/1
500 TABLET, FILM COATED ORAL EVERY 12 HOURS SCHEDULED
Status: DISCONTINUED | OUTPATIENT
Start: 2022-10-29 | End: 2022-11-06

## 2022-10-29 RX ADMIN — CIPROFLOXACIN HYDROCHLORIDE 500 MG: 500 TABLET, FILM COATED ORAL at 21:30

## 2022-10-29 RX ADMIN — MEROPENEM 1 G: 1 INJECTION, POWDER, FOR SOLUTION INTRAVENOUS at 04:30

## 2022-10-29 RX ADMIN — POTASSIUM PHOSPHATE, MONOBASIC AND POTASSIUM PHOSPHATE, DIBASIC 15 MMOL: 224; 236 INJECTION, SOLUTION INTRAVENOUS at 10:55

## 2022-10-29 RX ADMIN — LEVOTHYROXINE SODIUM 100 MCG: 100 TABLET ORAL at 08:22

## 2022-10-29 RX ADMIN — METRONIDAZOLE 500 MG: 500 TABLET ORAL at 21:30

## 2022-10-29 RX ADMIN — METRONIDAZOLE 500 MG: 500 TABLET ORAL at 15:05

## 2022-10-29 RX ADMIN — POTASSIUM PHOSPHATE, MONOBASIC AND POTASSIUM PHOSPHATE, DIBASIC 15 MMOL: 224; 236 INJECTION, SOLUTION INTRAVENOUS at 08:22

## 2022-10-29 RX ADMIN — SODIUM PHOSPHATE, MONOBASIC, MONOHYDRATE AND SODIUM PHOSPHATE, DIBASIC, ANHYDROUS 15 MMOL: 276; 142 INJECTION, SOLUTION INTRAVENOUS at 00:12

## 2022-10-29 RX ADMIN — POTASSIUM & SODIUM PHOSPHATES POWDER PACK 280-160-250 MG 1 PACKET: 280-160-250 PACK at 21:30

## 2022-10-29 RX ADMIN — POTASSIUM & SODIUM PHOSPHATES POWDER PACK 280-160-250 MG 1 PACKET: 280-160-250 PACK at 17:16

## 2022-10-29 RX ADMIN — HYDROMORPHONE HYDROCHLORIDE 2 MG: 2 TABLET ORAL at 08:29

## 2022-10-29 RX ADMIN — HYDROMORPHONE HYDROCHLORIDE 2 MG: 2 TABLET ORAL at 21:30

## 2022-10-29 ASSESSMENT — ACTIVITIES OF DAILY LIVING (ADL)
ADLS_ACUITY_SCORE: 27

## 2022-10-29 NOTE — PROGRESS NOTES
[facility]    Medicine Progress Note - Hospitalist Service, GOLD TEAM 6  Date of Admission: 10/26/2022    Assessment & Plan   Vernon Ortiz is a 62 year old male with history of hypothyroidism and HLD who was transferred from OSH to [facility] with acute necrotizing pancreatitis    Severe sepsis 2/2 a cute necrotizing pancreatitis: Presented to OSH with three days abdominal pain, N/V. CT AP acute pancreatitis with possible necrosis, focal complex fluid collection with gas at superior pancreatic body/tail junction c/f infection. At OSH, LA peak 4.2 (normalized), WBC 12.8, febrile to 102. S/p urgent IR drain placement on admission 10/26. Treated with Cefepime, Flagyl 10/26 to 10/27, Meropenem 10/27 to present. Fever resolved. WBC 14.4 (12.3),  (248), LFTs normal. Fluid culture from 10/26 +for pansensitive E coli. BCx NGTD    Etiology unclear: lipid panel 10/25 normal, minimal etoh use, no gallstones of biliary dilation on US 10/25, no recent travel, no family hx. Of note, is on simvastatin PTA.Patient reports trauma to the sternum a few days prior to symptoms and wonders if this could have been inciting etiology   - IR, GI, GS following  - Per GS, no plans for surgical intervention at this time  - Follow cultures   - [place]arrow to PO Cipro, Flagyl. Treat 7-10 days per GI  - Per d/w Dr. Santos, he plans to speak with IR on 10/31. Repeat CT AP with contrast 10/30. Will likely discharge with a perc drain in place and return for follow up imaging  - Advance to regular diet, stop IVF  - Per GS: portion of the drain is wrapping around the splenic vein, will need guidance or visualization when the drain is removed to protect the vasculature   - Pain management: Tylenol and PO hydromorphone prn with IV dilaudid for breakthrough   - Bowel regimen   - Family updated at beside  - PT, OT  - Transfer off 6B    Acute hypoxic respiratory failure, improving: No BL O2 needs. Tx from OSH on  4L. . Non-smoker. Asymptomatic. No tachycardia. Mild hypervolemia on bedside US performed by Dr. Hopkins 10/28. Etiology likely 2/2 atelectasis, possible OSH/JUNE, mild hypervolemia. Weaned to RA by writer 10/29. B/l wheeze noted on exam 10/29, no h/o asthma or COPD  - IS, wean O2 as able  - Encourage ambulation   - DuoNebs prn    Non severe protein calorie malnutrition: In the setting of the above  - Encourage oral intake  - Calorie counts  - May need to consider feeding tube if ongoing issues, goal will be to avoid     Hypophosphatemia: Phos 1.7, replace per protocol     Hyponatremia: Mild. Na 132 (139). Likely mild hypervolemia in the setting of high volume IVF. Of note, Na of 127 this am likely inaccurate given trend  - IVF stopped  - BMP 10/30, further workup if ongoing decline     Other Medical Issues:  Thrombocytopenia: Minimal, plts 147 1028. Since resolved  Abnormal fasting glucose: Glucose 157 on am fasting labs 10/27. A1c normal   Hypothyroidism: TSH normal 9/9/22. Continue synthroid   HLD: Hold statin        Diet: Regular Diet Adult  Calorie Counts  Snacks/Supplements Adult: [employer] Standard Oral Supplement; Between Meals    DVT Prophylaxis: Pneumatic Compression Devices  Haer Catheter: Not present  Central Lines: None  Cardiac Monitoring: None  Code Status: Full Code      Disposition Plan         The patient's care was discussed with the patient, family, nursing, GI, and attending physician, Dr. Yonis Corbin PA-C  Hospitalist Service, GOLD TEAM [address] [place] [address]  Securely message with the Vocera Web Console (learn more here)  Text page via [facility] Paging/Directory   Please see signed in provider for up to date coverage information      Clinically Significant Risk Factors         # Hyponatremia: Lowest Na = 127 mmol/L (Ref range: 136-145) in last 2 days, will monitor as appropriate      # Hypoalbuminemia: Lowest albumin = 2.8 g/dL (Ref  range: 3.5-5.2) at 10/29/2022  5:18 AM, will monitor as appropriate           # Obesity: Estimated body mass index is 32.5 kg/m  as calculated from the following:    Height as of this encounter: 1.829 m (6').    Weight as of this encounter: 108.7 kg (239 lb 10.2 oz)., PRESENT ON ADMISSION         ______________________________________________________________________    Interval History   Doing better today. Already up and walking. Sleep has been poor. Pain slightly improved. No chest pain, dyspnea, wheezing. Non-smoker. Reports that he hit his sternum/upper chest into his trolling motor while fishing about a week prior to pancreatitis symptoms. He wonders if that could be the cause. Appetite is poor. Minimal oral intake. No nausea or vomiting. Had a BM this am    Data reviewed today: I reviewed all medications, new labs and imaging results over the last 24 hours    Physical Exam   Vital Signs: Temp: 98.2  F (36.8  C) Temp src: Oral BP: (!) 151/90 Pulse: 73   Resp: 16 SpO2: 95 % O2 Device: None (Room air) Oxygen Delivery: 3 LPM  Weight: 239 lbs 10.24 oz  General Appearance: Alert and oriented x3, appears overall comfortable. Wife bedside  HEENT: Anicteric sclera, MMM   Respiratory: Breathing comfortably on 1L. Faint crackles in the bases   Cardiovascular: RRR, S1, S2. No murmur noted  GI: Abdomen soft with mild diffuse tenderness. Anterior drain in L side of abdomen. No guarding or rebound. Bowel sounds active  Lymph/Hematologic: No peripheral edema, distal pulses palpable   Skin: No rash or jaundice   Musculoskeletal: Moves all extremities   Neurologic: No focal deficits, CN II-XII grossly intact      Data   Recent Labs   Lab 10/29/22  0857 10/29/22  0518 10/28/22  0547 10/27/22  0600   WBC  --  14.4* 12.3* 12.0*   HGB  --  13.4 13.4 14.1   MCV  --  92 92 90   PLT  --  166 147* 134*   * 127* 139 139   POTASSIUM 4.4 4.0 4.5 4.6   CHLORIDE 99 94* 106 106   CO2 22 25 26 25   BUN 14.6 14.5 18.1 23.3*   CR 0.86  0.87 0.93 0.99   ANIONGAP 11 8 7 8   DOLORES 7.7* 7.5* 7.9* 7.9*   * 175* 131* 157*   ALBUMIN  --  2.8* 2.8* 2.9*   PROTTOTAL  --  5.7* 5.7* 5.6*   BILITOTAL  --  1.0 0.7 0.9   ALKPHOS  --  64 48 47   ALT  --  22 29 42   AST  --  25 30 49     No results found for this or any previous visit (from the past 24 hour(s)).

## 2022-10-29 NOTE — PROGRESS NOTES
Shift Summary: Drain flushed q8h. Minimal output. See flowsheets for output. Pain managed with PRN dilaudid. Bowel sounds active. 2BM today. Good UOP. SBA walking hallways with family. Vitals stable, afebrile. Will continue to monitor.    Kameron Simmons RN on 10/29/2022 at 6:23 PM

## 2022-10-29 NOTE — PLAN OF CARE
Goal Outcome Evaluation:      Plan of Care Reviewed With: patient    Overall Patient Progress: improvingOverall Patient Progress: improving    Neuro: A&Ox4.   Cardiac: SR. VSS.   Respiratory: Sating 98% on 2-3L NC, audible wheezing patient denies SOB unless walking a lot  GI/: Adequate urine output. BM X0  Diet/appetite: Tolerating full liquid diet. Eating well.  Activity:  Assist of 1, up to chair and in halls.  Pain: At acceptable level on current regimen. Dilaudid given once last night  Skin: No new deficits noted.      Plan: Continue with POC. Notify primary team with changes.

## 2022-10-30 ENCOUNTER — APPOINTMENT (OUTPATIENT)
Dept: CT IMAGING | Facility: CLINIC | Age: 62
End: 2022-10-30
Attending: PHYSICIAN ASSISTANT
Payer: COMMERCIAL

## 2022-10-30 LAB
ANION GAP SERPL CALCULATED.3IONS-SCNC: 12 MMOL/L (ref 7–15)
BUN SERPL-MCNC: 11.9 MG/DL (ref 8–23)
CALCIUM SERPL-MCNC: 7.6 MG/DL (ref 8.8–10.2)
CHLORIDE SERPL-SCNC: 100 MMOL/L (ref 98–107)
CREAT SERPL-MCNC: 0.94 MG/DL (ref 0.67–1.17)
CRP SERPL-MCNC: 205 MG/L
DEPRECATED HCO3 PLAS-SCNC: 24 MMOL/L (ref 22–29)
ERYTHROCYTE [DISTWIDTH] IN BLOOD BY AUTOMATED COUNT: 14 % (ref 10–15)
GFR SERPL CREATININE-BSD FRML MDRD: >90 ML/MIN/1.73M2
GLUCOSE SERPL-MCNC: 215 MG/DL (ref 70–99)
HCT VFR BLD AUTO: 39.6 % (ref 40–53)
HGB BLD-MCNC: 12.9 G/DL (ref 13.3–17.7)
MAGNESIUM SERPL-MCNC: 1.9 MG/DL (ref 1.7–2.3)
MCH RBC QN AUTO: 29.7 PG (ref 26.5–33)
MCHC RBC AUTO-ENTMCNC: 32.6 G/DL (ref 31.5–36.5)
MCV RBC AUTO: 91 FL (ref 78–100)
PHOSPHATE SERPL-MCNC: 1.9 MG/DL (ref 2.5–4.5)
PHOSPHATE SERPL-MCNC: 2.4 MG/DL (ref 2.5–4.5)
PLATELET # BLD AUTO: 176 10E3/UL (ref 150–450)
POTASSIUM SERPL-SCNC: 4.3 MMOL/L (ref 3.4–5.3)
RBC # BLD AUTO: 4.34 10E6/UL (ref 4.4–5.9)
SODIUM SERPL-SCNC: 136 MMOL/L (ref 136–145)
WBC # BLD AUTO: 18.4 10E3/UL (ref 4–11)

## 2022-10-30 PROCEDURE — 86140 C-REACTIVE PROTEIN: CPT | Performed by: PHYSICIAN ASSISTANT

## 2022-10-30 PROCEDURE — 74177 CT ABD & PELVIS W/CONTRAST: CPT | Mod: 26 | Performed by: RADIOLOGY

## 2022-10-30 PROCEDURE — 250N000013 HC RX MED GY IP 250 OP 250 PS 637: Performed by: PHYSICIAN ASSISTANT

## 2022-10-30 PROCEDURE — 80048 BASIC METABOLIC PNL TOTAL CA: CPT | Performed by: PHYSICIAN ASSISTANT

## 2022-10-30 PROCEDURE — 84100 ASSAY OF PHOSPHORUS: CPT

## 2022-10-30 PROCEDURE — 250N000011 HC RX IP 250 OP 636: Performed by: STUDENT IN AN ORGANIZED HEALTH CARE EDUCATION/TRAINING PROGRAM

## 2022-10-30 PROCEDURE — 36415 COLL VENOUS BLD VENIPUNCTURE: CPT | Performed by: PHYSICIAN ASSISTANT

## 2022-10-30 PROCEDURE — 74177 CT ABD & PELVIS W/CONTRAST: CPT

## 2022-10-30 PROCEDURE — 250N000009 HC RX 250: Performed by: STUDENT IN AN ORGANIZED HEALTH CARE EDUCATION/TRAINING PROGRAM

## 2022-10-30 PROCEDURE — 84100 ASSAY OF PHOSPHORUS: CPT | Performed by: STUDENT IN AN ORGANIZED HEALTH CARE EDUCATION/TRAINING PROGRAM

## 2022-10-30 PROCEDURE — 83735 ASSAY OF MAGNESIUM: CPT | Performed by: PHYSICIAN ASSISTANT

## 2022-10-30 PROCEDURE — 120N000003 HC R&B IMCU UMMC

## 2022-10-30 PROCEDURE — 36415 COLL VENOUS BLD VENIPUNCTURE: CPT | Performed by: STUDENT IN AN ORGANIZED HEALTH CARE EDUCATION/TRAINING PROGRAM

## 2022-10-30 PROCEDURE — 250N000013 HC RX MED GY IP 250 OP 250 PS 637

## 2022-10-30 PROCEDURE — 99207 PR APP CREDIT; MD BILLING SHARED VISIT: CPT | Performed by: PHYSICIAN ASSISTANT

## 2022-10-30 PROCEDURE — 85027 COMPLETE CBC AUTOMATED: CPT | Performed by: PHYSICIAN ASSISTANT

## 2022-10-30 PROCEDURE — 99232 SBSQ HOSP IP/OBS MODERATE 35: CPT | Performed by: STUDENT IN AN ORGANIZED HEALTH CARE EDUCATION/TRAINING PROGRAM

## 2022-10-30 PROCEDURE — 258N000003 HC RX IP 258 OP 636: Performed by: STUDENT IN AN ORGANIZED HEALTH CARE EDUCATION/TRAINING PROGRAM

## 2022-10-30 RX ORDER — IOPAMIDOL 755 MG/ML
135 INJECTION, SOLUTION INTRAVASCULAR ONCE
Status: COMPLETED | OUTPATIENT
Start: 2022-10-30 | End: 2022-10-30

## 2022-10-30 RX ORDER — POLYETHYLENE GLYCOL 3350 17 G/17G
17 POWDER, FOR SOLUTION ORAL DAILY PRN
Status: DISCONTINUED | OUTPATIENT
Start: 2022-10-30 | End: 2022-10-30

## 2022-10-30 RX ADMIN — METRONIDAZOLE 500 MG: 500 TABLET ORAL at 19:52

## 2022-10-30 RX ADMIN — CIPROFLOXACIN HYDROCHLORIDE 500 MG: 500 TABLET, FILM COATED ORAL at 19:52

## 2022-10-30 RX ADMIN — IOPAMIDOL 135 ML: 755 INJECTION, SOLUTION INTRAVENOUS at 09:28

## 2022-10-30 RX ADMIN — POTASSIUM & SODIUM PHOSPHATES POWDER PACK 280-160-250 MG 1 PACKET: 280-160-250 PACK at 00:56

## 2022-10-30 RX ADMIN — CIPROFLOXACIN HYDROCHLORIDE 500 MG: 500 TABLET, FILM COATED ORAL at 07:42

## 2022-10-30 RX ADMIN — HYDROMORPHONE HYDROCHLORIDE 2 MG: 2 TABLET ORAL at 21:52

## 2022-10-30 RX ADMIN — METRONIDAZOLE 500 MG: 500 TABLET ORAL at 15:30

## 2022-10-30 RX ADMIN — POTASSIUM PHOSPHATE, MONOBASIC POTASSIUM PHOSPHATE, DIBASIC 15 MMOL: 224; 236 INJECTION, SOLUTION, CONCENTRATE INTRAVENOUS at 10:59

## 2022-10-30 RX ADMIN — POTASSIUM PHOSPHATE, MONOBASIC POTASSIUM PHOSPHATE, DIBASIC 15 MMOL: 224; 236 INJECTION, SOLUTION, CONCENTRATE INTRAVENOUS at 13:24

## 2022-10-30 RX ADMIN — METRONIDAZOLE 500 MG: 500 TABLET ORAL at 07:42

## 2022-10-30 RX ADMIN — LEVOTHYROXINE SODIUM 100 MCG: 100 TABLET ORAL at 07:42

## 2022-10-30 ASSESSMENT — ACTIVITIES OF DAILY LIVING (ADL)
ADLS_ACUITY_SCORE: 27
ADLS_ACUITY_SCORE: 25
ADLS_ACUITY_SCORE: 27

## 2022-10-30 NOTE — PROGRESS NOTES
BP (!) 153/94 (BP Location: Right arm)   Pulse 86   Temp 98.1  F (36.7  C) (Oral)   Resp 16   Ht 1.829 m (6')   Wt 108.5 kg (239 lb 3.2 oz)   SpO2 94%   BMI 32.44 kg/m      Hours of Care: [id number].    Vitals:  Stable, HTN noted.   Activity:  Independent  Pain:  Minimal, 2/10, declined PRN pain meds.  Neuro:  A+Ox4, sensation intact.  Cardiac:  SR, no c/o CP.    Respiratory:  R/A, no c/o SOB  GI/:  Voiding in BR, declined laxatives.  Diet:  Regular, decreased appetite, no c/o N/V.    Lines:  SLx2  Skin/Wounds:  Intact.  Abd Drain site C/D/I   Plan:  TBD      Continue to monitor and follow POC    Teo Rodrigues RN on 10/30/2022 at 4:43 PM

## 2022-10-30 NOTE — PLAN OF CARE
Goal Outcome Evaluation:      Plan of Care Reviewed With: patient  Overall Patient Progress: improvingOverall Patient Progress: improving    Neuro: A&Ox4.   Cardiac: SR. VSS.   Respiratory: Sating 95-97% on RA.  GI/: Adequate urine output. BM X0  Diet/appetite: Tolerating regular diet. Eating well.  Activity:  Stand by assist, up to chair and in halls.  Pain: At acceptable level on current regimen.   Skin: No new deficits noted.  LDA's:    Plan: Continue with POC. Notify primary team with changes.    Patient has pancreatic drain still in place,output is bright red blood, less output than previous night
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
## 2022-10-31 ENCOUNTER — APPOINTMENT (OUTPATIENT)
Dept: OCCUPATIONAL THERAPY | Facility: CLINIC | Age: 62
End: 2022-10-31
Attending: STUDENT IN AN ORGANIZED HEALTH CARE EDUCATION/TRAINING PROGRAM
Payer: COMMERCIAL

## 2022-10-31 LAB
ANION GAP SERPL CALCULATED.3IONS-SCNC: 13 MMOL/L (ref 7–15)
BACTERIA FLD CULT: ABNORMAL
BUN SERPL-MCNC: 12.6 MG/DL (ref 8–23)
CALCIUM SERPL-MCNC: 8 MG/DL (ref 8.8–10.2)
CHLORIDE SERPL-SCNC: 101 MMOL/L (ref 98–107)
CREAT SERPL-MCNC: 0.91 MG/DL (ref 0.67–1.17)
DEPRECATED HCO3 PLAS-SCNC: 20 MMOL/L (ref 22–29)
ERYTHROCYTE [DISTWIDTH] IN BLOOD BY AUTOMATED COUNT: 14.4 % (ref 10–15)
GFR SERPL CREATININE-BSD FRML MDRD: >90 ML/MIN/1.73M2
GLUCOSE BLDC GLUCOMTR-MCNC: 366 MG/DL (ref 70–99)
GLUCOSE BLDC GLUCOMTR-MCNC: 384 MG/DL (ref 70–99)
GLUCOSE BLDC GLUCOMTR-MCNC: 418 MG/DL (ref 70–99)
GLUCOSE SERPL-MCNC: 251 MG/DL (ref 70–99)
GRAM STAIN RESULT: ABNORMAL
GRAM STAIN RESULT: ABNORMAL
HCT VFR BLD AUTO: 42.7 % (ref 40–53)
HGB BLD-MCNC: 14.1 G/DL (ref 13.3–17.7)
MCH RBC QN AUTO: 29.9 PG (ref 26.5–33)
MCHC RBC AUTO-ENTMCNC: 33 G/DL (ref 31.5–36.5)
MCV RBC AUTO: 91 FL (ref 78–100)
PHOSPHATE SERPL-MCNC: 2.7 MG/DL (ref 2.5–4.5)
PLATELET # BLD AUTO: 166 10E3/UL (ref 150–450)
POTASSIUM SERPL-SCNC: 4.8 MMOL/L (ref 3.4–5.3)
RBC # BLD AUTO: 4.71 10E6/UL (ref 4.4–5.9)
SODIUM SERPL-SCNC: 134 MMOL/L (ref 136–145)
WBC # BLD AUTO: 22.9 10E3/UL (ref 4–11)

## 2022-10-31 PROCEDURE — 36415 COLL VENOUS BLD VENIPUNCTURE: CPT | Performed by: PHYSICIAN ASSISTANT

## 2022-10-31 PROCEDURE — 250N000013 HC RX MED GY IP 250 OP 250 PS 637: Performed by: PHYSICIAN ASSISTANT

## 2022-10-31 PROCEDURE — 120N000003 HC R&B IMCU UMMC

## 2022-10-31 PROCEDURE — 99207 PR APP CREDIT; MD BILLING SHARED VISIT: CPT | Performed by: PHYSICIAN ASSISTANT

## 2022-10-31 PROCEDURE — 99233 SBSQ HOSP IP/OBS HIGH 50: CPT | Performed by: STUDENT IN AN ORGANIZED HEALTH CARE EDUCATION/TRAINING PROGRAM

## 2022-10-31 PROCEDURE — 82310 ASSAY OF CALCIUM: CPT | Performed by: PHYSICIAN ASSISTANT

## 2022-10-31 PROCEDURE — 97535 SELF CARE MNGMENT TRAINING: CPT | Mod: GO

## 2022-10-31 PROCEDURE — 250N000012 HC RX MED GY IP 250 OP 636 PS 637: Performed by: PHYSICIAN ASSISTANT

## 2022-10-31 PROCEDURE — 97530 THERAPEUTIC ACTIVITIES: CPT | Mod: GO

## 2022-10-31 PROCEDURE — 85027 COMPLETE CBC AUTOMATED: CPT | Performed by: PHYSICIAN ASSISTANT

## 2022-10-31 PROCEDURE — 84100 ASSAY OF PHOSPHORUS: CPT | Performed by: STUDENT IN AN ORGANIZED HEALTH CARE EDUCATION/TRAINING PROGRAM

## 2022-10-31 RX ORDER — NICOTINE POLACRILEX 4 MG
15-30 LOZENGE BUCCAL
Status: DISCONTINUED | OUTPATIENT
Start: 2022-10-31 | End: 2022-11-07 | Stop reason: HOSPADM

## 2022-10-31 RX ORDER — DEXTROSE MONOHYDRATE 25 G/50ML
25-50 INJECTION, SOLUTION INTRAVENOUS
Status: DISCONTINUED | OUTPATIENT
Start: 2022-10-31 | End: 2022-11-07 | Stop reason: HOSPADM

## 2022-10-31 RX ADMIN — CIPROFLOXACIN HYDROCHLORIDE 500 MG: 500 TABLET, FILM COATED ORAL at 19:50

## 2022-10-31 RX ADMIN — PANCRELIPASE 3 CAPSULE: 24000; 76000; 120000 CAPSULE, DELAYED RELEASE PELLETS ORAL at 17:43

## 2022-10-31 RX ADMIN — SENNOSIDES AND DOCUSATE SODIUM 1 TABLET: 8.6; 5 TABLET ORAL at 08:56

## 2022-10-31 RX ADMIN — CIPROFLOXACIN HYDROCHLORIDE 500 MG: 500 TABLET, FILM COATED ORAL at 08:55

## 2022-10-31 RX ADMIN — METRONIDAZOLE 500 MG: 500 TABLET ORAL at 19:50

## 2022-10-31 RX ADMIN — INSULIN ASPART 5 UNITS: 100 INJECTION, SOLUTION INTRAVENOUS; SUBCUTANEOUS at 14:51

## 2022-10-31 RX ADMIN — LEVOTHYROXINE SODIUM 100 MCG: 100 TABLET ORAL at 08:55

## 2022-10-31 RX ADMIN — METRONIDAZOLE 500 MG: 500 TABLET ORAL at 15:02

## 2022-10-31 RX ADMIN — METRONIDAZOLE 500 MG: 500 TABLET ORAL at 08:55

## 2022-10-31 RX ADMIN — INSULIN ASPART 6 UNITS: 100 INJECTION, SOLUTION INTRAVENOUS; SUBCUTANEOUS at 17:34

## 2022-10-31 ASSESSMENT — ACTIVITIES OF DAILY LIVING (ADL)
ADLS_ACUITY_SCORE: 25
ADLS_ACUITY_SCORE: 22
ADLS_ACUITY_SCORE: 25

## 2022-10-31 NOTE — PLAN OF CARE
Goal Outcome Evaluation:  Neuro: A&Ox4.   Cardiac: SR. VSS.   Respiratory: Sating 96% on RA.  GI/: Adequate urine output. BM X0  Diet/appetite: Tolerating regular diet. Eating well.  Activity:  Independent up to chair and in halls.  Pain: At acceptable level on current regimen.   Skin: No new deficits noted.      Plan: Continue with POC. Notify primary team with changes.    Abdominal drain had about same output as previous night, light brown color. Mild pain in abdomen

## 2022-10-31 NOTE — PROGRESS NOTES
Calorie Count  Intake recorded for: 10/30  Total Kcals: 0 Total Protein: 0g  Kcals from Hospital Food: 0   Protein: 0g  Kcals from Outside Food (average):0 Protein: 0g  # Meals Ordered from Kitchen: 3  # Meals Recorded: No food intake recorded  # Supplements Recorded: No intake recorded
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
## 2022-10-31 NOTE — PROGRESS NOTES
Gastroenterology Follow up Note         Assessment and Plan:     Vernon Ortiz is a 62 year old male with a history of hyperlipidemia and hypothyroidism was admitted on 10/26 from OSH for necrotizing pancreatitis with concern for infected necrosis. Now s/p IR drain placement.      #Acute necrotizing pancreatitis  #Infected necrotic collection s/p IR drain placement     CT imaging showing large, complex necrotic collection around 4 cm along the superior aspect of the pancreatic body/tail junction containing gas and fluid concerning for infection. Went directly to IR, as collection too immature to be drained by GI, and no retroperitoneal window to be candidate for VARD by Surgery. IR successfully placed drain to gravity. Fluid sample was sent for culture and amylase/lipase levels. Patient was then admitted to intermediate care unit for further evaluation. GI and Surgery also on board. No plan for VARD at this time from Surgery standpoint, collection would need to further mature prior to any planned surgical debridement.      Unclear etiology of pancreatitis. Could be in setting of microlithiasis/passed stone? Patient did respond recent abdominal injury (while playing golf?). No gallstones visualized on US abdomen, bilirubin normal (although elevated slightly at OSH? Passed stone?), no significant alcohol use, normal triglyceride levels, not on any medications associated with AP.     Patient is doing well, tolerating PO diet, pain under control with pain meds, no fever/chills.      CT abdomen/pelvis from 10/30 with slightly increased peripancreatic gas, fluid, and  fat stranding without discrete focal enhancing collection to suggest  abscess.           Recommendations:     - Patient clinically improving, but with worsening leukocytosis and increased collection with gas and fluid on repeat CT. Current leukocytosis likely in setting of pancreatitis rather than infected necrosis. Will reach out to IR for further  recommendations (possible sinogram vs upsizing drain vs other options).   - Continue IV antibiotics with Gram negative/anaerobic coverage.  - Appreciate IR and Surgery recs.  - Continue IVF resuscitation.  - Continue regular diet.   - Pain management per primary team.  - No endoscopic interventions from GI standpoint at this time.     It has been a pleasure to participate in the care of this patient.  Patient discussed with GI staff, Dr. Santos.  Please do not hesitate to contact the GI service with any questions or concerns.     Sukumar Solorio MD  Gastroenterology Fellow  Pager [phone]         Subjective/Objective:   - No acute events overnight  Doing well, reports 2/10 abdominal pain, tolerates regular diet, denies fever or other symptoms.          Medications:     Current Facility-Administered Medications   Medication     acetaminophen (TYLENOL) tablet 650 mg     ciprofloxacin (CIPRO) tablet 500 mg     hydrALAZINE (APRESOLINE) injection 10 mg     HYDROmorphone (DILAUDID) tablet 2 mg     ipratropium - albuterol 0.5 mg/2.5 mg/3 mL (DUONEB) neb solution 3 mL     levothyroxine (SYNTHROID/LEVOTHROID) tablet 100 mcg     lidocaine (LMX4) cream     lidocaine 1 % 0.1-1 mL     melatonin tablet 1 mg     metroNIDAZOLE (FLAGYL) tablet 500 mg     naloxone (NARCAN) injection 0.2 mg    Or     naloxone (NARCAN) injection 0.4 mg    Or     naloxone (NARCAN) injection 0.2 mg    Or     naloxone (NARCAN) injection 0.4 mg     polyethylene glycol (MIRALAX) Packet 17 g     prochlorperazine (COMPAZINE) injection 10 mg    Or     prochlorperazine (COMPAZINE) tablet 10 mg    Or     prochlorperazine (COMPAZINE) suppository 25 mg     senna-docusate (SENOKOT-S/PERICOLACE) 8.6-50 MG per tablet 1 tablet     senna-docusate (SENOKOT-S/PERICOLACE) 8.6-50 MG per tablet 1 tablet    Or     senna-docusate (SENOKOT-S/PERICOLACE) 8.6-50 MG per tablet 2 tablet     sodium chloride (PF) 0.9% PF flush 10 mL     sodium chloride (PF) 0.9% PF flush 3 mL      sodium chloride (PF) 0.9% PF flush 3 mL            Physical Exam:   VS:  BP (!) 150/94 (BP Location: Right arm)   Pulse 71   Temp 98.5  F (36.9  C) (Oral)   Resp 16   Ht 1.829 m (6')   Wt 99.2 kg (218 lb 11.1 oz)   SpO2 96%   BMI 29.66 kg/m      Wt:   Wt Readings from Last 2 Encounters:   10/31/22 99.2 kg (218 lb 11.1 oz)      Constitutional: cooperative, pleasant, no acute distress  Eyes: Conjunctiva anicteric  HEENT: Normal oropharynx without ulcers or exudate, mucus membranes moist  CV: RRR, no m/r/g  Respiratory: CTAB  Abd:  Nondistended, +bs, no hepatosplenomegaly, nontender, no rebound or guarding   Skin: warm, perfused, no jaundice  Neuro: AAO x 3, No asterixis         Laboratory:   BMP  Recent Labs   Lab 10/31/22  0518 10/30/22  0520 10/29/22  0857 10/29/22  0518   * 136 132* 127*   POTASSIUM 4.8 4.3 4.4 4.0   CHLORIDE 101 100 99 94*   DOLORES 8.0* 7.6* 7.7* 7.5*   CO2 20* 24 22 25   BUN 12.6 11.9 14.6 14.5   CR 0.91 0.94 0.86 0.87   * 215* 176* 175*     CBC  Recent Labs   Lab 10/31/22  0518 10/30/22  0520 10/29/22  0518 10/28/22  0547   WBC 22.9* 18.4* 14.4* 12.3*   RBC 4.71 4.34* 4.46 4.45   HGB 14.1 12.9* 13.4 13.4   HCT 42.7 39.6* 40.9 40.8   MCV 91 91 92 92   MCH 29.9 29.7 30.0 30.1   MCHC 33.0 32.6 32.8 32.8   RDW 14.4 14.0 13.9 14.0    176 166 147*     INRNo lab results found in last 7 days.  LFTs  Recent Labs   Lab 10/29/22  0518 10/28/22  0547 10/27/22  0600   ALKPHOS 64 48 47   AST 25 30 49   ALT 22 29 42   BILITOTAL 1.0 0.7 0.9   PROTTOTAL 5.7* 5.7* 5.6*   ALBUMIN 2.8* 2.8* 2.9*      PANCNo lab results found in last 7 days.         Imaging/Procedures/Studies:   No results found for this or any previous visit.    CT abdomen/pelvis 10/30/2022    IMPRESSION:  1. Sequela of necrotizing pancreatitis with new peripancreatic drain  in place. There is slightly increased peripancreatic gas, fluid, and  fat stranding without discrete focal enhancing collection to suggest  abscess.  No evidence of viscus perforation or vascular injury adjacent  to the pancreas.  2. Unchanged reactive fluid along the pericolic gutters and small  volume free pelvic fluid. Stable reactive changes of the colon in the  upper pericolic gutters.  3. Colonic diverticulosis without evidence of diverticulitis.

## 2022-10-31 NOTE — PROGRESS NOTES
General Surgery Progress Note  10/31/2022   ------------------------------------------------------------------------------------------------  Assessment/Plan:   62 year old male with necrotizing pancreatitis status post IR drainage on 10/26/2022.  Doing well with no signs or symptoms of iatrogenic injury during drain placement or signs of decompensation.  Pain much better this morning.     - Continue plan per primary.  - No indication for surgical intervention at this point.  -- Surgery team available as needed    Subjective:  Doing well, feels well. Abdominal pain improving. Having bowel movements. Tolerating diet.   ------------------------------------------------------------------------------------------------  Objective:  Temp:  [97.5  F (36.4  C)-99.4  F (37.4  C)] 98  F (36.7  C)  Pulse:  [74-92] 74  Resp:  [14-18] 18  BP: (143-157)/(84-99) 157/99  SpO2:  [94 %-97 %] 97 %    I/O last 3 completed shifts:  In: 10 [Other:10]  Out: 450 [Urine:300; Drains:150]      Gen: Awake, interactive, NAD  Resp: breathing comfortably on room air  CV: appears well perfused  Abd: soft, nondistended, nontender, retroperitoneal drain in place  Ext: palpable pulses, no edema       Seen, examined, and discussed with chief resident, who will discuss with staff.    Kris Carrillo MD PGY1  Integrated Plastic and Reconstructive Surgery

## 2022-11-01 ENCOUNTER — APPOINTMENT (OUTPATIENT)
Dept: OCCUPATIONAL THERAPY | Facility: CLINIC | Age: 62
End: 2022-11-01
Attending: STUDENT IN AN ORGANIZED HEALTH CARE EDUCATION/TRAINING PROGRAM
Payer: COMMERCIAL

## 2022-11-01 ENCOUNTER — APPOINTMENT (OUTPATIENT)
Dept: INTERVENTIONAL RADIOLOGY/VASCULAR | Facility: CLINIC | Age: 62
End: 2022-11-01
Attending: NURSE PRACTITIONER
Payer: COMMERCIAL

## 2022-11-01 LAB
ANION GAP SERPL CALCULATED.3IONS-SCNC: 15 MMOL/L (ref 7–15)
BACTERIA BLD CULT: NO GROWTH
BACTERIA BLD CULT: NO GROWTH
BUN SERPL-MCNC: 14.9 MG/DL (ref 8–23)
CALCIUM SERPL-MCNC: 7.9 MG/DL (ref 8.8–10.2)
CHLORIDE SERPL-SCNC: 100 MMOL/L (ref 98–107)
CREAT SERPL-MCNC: 0.92 MG/DL (ref 0.67–1.17)
DEPRECATED HCO3 PLAS-SCNC: 19 MMOL/L (ref 22–29)
ERYTHROCYTE [DISTWIDTH] IN BLOOD BY AUTOMATED COUNT: 14.3 % (ref 10–15)
GFR SERPL CREATININE-BSD FRML MDRD: >90 ML/MIN/1.73M2
GLUCOSE BLDC GLUCOMTR-MCNC: 238 MG/DL (ref 70–99)
GLUCOSE BLDC GLUCOMTR-MCNC: 287 MG/DL (ref 70–99)
GLUCOSE BLDC GLUCOMTR-MCNC: 290 MG/DL (ref 70–99)
GLUCOSE BLDC GLUCOMTR-MCNC: 294 MG/DL (ref 70–99)
GLUCOSE BLDC GLUCOMTR-MCNC: 347 MG/DL (ref 70–99)
GLUCOSE SERPL-MCNC: 295 MG/DL (ref 70–99)
HCT VFR BLD AUTO: 42.5 % (ref 40–53)
HGB BLD-MCNC: 14.1 G/DL (ref 13.3–17.7)
MCH RBC QN AUTO: 29.5 PG (ref 26.5–33)
MCHC RBC AUTO-ENTMCNC: 33.2 G/DL (ref 31.5–36.5)
MCV RBC AUTO: 89 FL (ref 78–100)
PHOSPHATE SERPL-MCNC: 2.7 MG/DL (ref 2.5–4.5)
PLATELET # BLD AUTO: 241 10E3/UL (ref 150–450)
POTASSIUM SERPL-SCNC: 4.3 MMOL/L (ref 3.4–5.3)
RBC # BLD AUTO: 4.78 10E6/UL (ref 4.4–5.9)
SODIUM SERPL-SCNC: 134 MMOL/L (ref 136–145)
WBC # BLD AUTO: 26.3 10E3/UL (ref 4–11)

## 2022-11-01 PROCEDURE — C1729 CATH, DRAINAGE: HCPCS

## 2022-11-01 PROCEDURE — 99207 PR CDG-CUT & PASTE-POTENTIAL IMPACT ON LEVEL: CPT | Performed by: STUDENT IN AN ORGANIZED HEALTH CARE EDUCATION/TRAINING PROGRAM

## 2022-11-01 PROCEDURE — 250N000009 HC RX 250: Performed by: PHYSICIAN ASSISTANT

## 2022-11-01 PROCEDURE — 84100 ASSAY OF PHOSPHORUS: CPT | Performed by: STUDENT IN AN ORGANIZED HEALTH CARE EDUCATION/TRAINING PROGRAM

## 2022-11-01 PROCEDURE — 80048 BASIC METABOLIC PNL TOTAL CA: CPT | Performed by: PHYSICIAN ASSISTANT

## 2022-11-01 PROCEDURE — 49423 EXCHANGE DRAINAGE CATHETER: CPT

## 2022-11-01 PROCEDURE — 84681 ASSAY OF C-PEPTIDE: CPT | Performed by: PHYSICIAN ASSISTANT

## 2022-11-01 PROCEDURE — 0W2GX0Z CHANGE DRAINAGE DEVICE IN PERITONEAL CAVITY, EXTERNAL APPROACH: ICD-10-PCS | Performed by: RADIOLOGY

## 2022-11-01 PROCEDURE — 76080 X-RAY EXAM OF FISTULA: CPT | Mod: 26 | Performed by: RADIOLOGY

## 2022-11-01 PROCEDURE — 250N000013 HC RX MED GY IP 250 OP 250 PS 637: Performed by: PHYSICIAN ASSISTANT

## 2022-11-01 PROCEDURE — 75984 XRAY CONTROL CATHETER CHANGE: CPT | Mod: 26 | Performed by: RADIOLOGY

## 2022-11-01 PROCEDURE — 49424 ASSESS CYST CONTRAST INJECT: CPT | Mod: GC | Performed by: RADIOLOGY

## 2022-11-01 PROCEDURE — 255N000002 HC RX 255 OP 636: Performed by: RADIOLOGY

## 2022-11-01 PROCEDURE — 36415 COLL VENOUS BLD VENIPUNCTURE: CPT | Performed by: PHYSICIAN ASSISTANT

## 2022-11-01 PROCEDURE — 99233 SBSQ HOSP IP/OBS HIGH 50: CPT | Performed by: STUDENT IN AN ORGANIZED HEALTH CARE EDUCATION/TRAINING PROGRAM

## 2022-11-01 PROCEDURE — 85027 COMPLETE CBC AUTOMATED: CPT | Performed by: PHYSICIAN ASSISTANT

## 2022-11-01 PROCEDURE — 49423 EXCHANGE DRAINAGE CATHETER: CPT | Mod: GC | Performed by: RADIOLOGY

## 2022-11-01 PROCEDURE — 120N000011 HC R&B TRANSPLANT UMMC

## 2022-11-01 PROCEDURE — C1769 GUIDE WIRE: HCPCS

## 2022-11-01 PROCEDURE — 75984 XRAY CONTROL CATHETER CHANGE: CPT

## 2022-11-01 PROCEDURE — 82653 EL-1 FECAL QUANTITATIVE: CPT | Performed by: PHYSICIAN ASSISTANT

## 2022-11-01 PROCEDURE — C1887 CATHETER, GUIDING: HCPCS

## 2022-11-01 PROCEDURE — 250N000012 HC RX MED GY IP 250 OP 636 PS 637: Performed by: PHYSICIAN ASSISTANT

## 2022-11-01 PROCEDURE — 97530 THERAPEUTIC ACTIVITIES: CPT | Mod: GO

## 2022-11-01 PROCEDURE — 99231 SBSQ HOSP IP/OBS SF/LOW 25: CPT | Mod: GC | Performed by: INTERNAL MEDICINE

## 2022-11-01 RX ORDER — LIDOCAINE HYDROCHLORIDE 10 MG/ML
1-30 INJECTION, SOLUTION EPIDURAL; INFILTRATION; INTRACAUDAL; PERINEURAL
Status: COMPLETED | OUTPATIENT
Start: 2022-11-01 | End: 2022-11-01

## 2022-11-01 RX ORDER — IODIXANOL 320 MG/ML
100 INJECTION, SOLUTION INTRAVASCULAR ONCE
Status: COMPLETED | OUTPATIENT
Start: 2022-11-01 | End: 2022-11-01

## 2022-11-01 RX ADMIN — METRONIDAZOLE 500 MG: 500 TABLET ORAL at 20:20

## 2022-11-01 RX ADMIN — SENNOSIDES AND DOCUSATE SODIUM 1 TABLET: 8.6; 5 TABLET ORAL at 07:27

## 2022-11-01 RX ADMIN — IODIXANOL 30 ML: 320 INJECTION, SOLUTION INTRAVASCULAR at 14:28

## 2022-11-01 RX ADMIN — METRONIDAZOLE 500 MG: 500 TABLET ORAL at 07:27

## 2022-11-01 RX ADMIN — CIPROFLOXACIN HYDROCHLORIDE 500 MG: 500 TABLET, FILM COATED ORAL at 20:20

## 2022-11-01 RX ADMIN — METRONIDAZOLE 500 MG: 500 TABLET ORAL at 14:52

## 2022-11-01 RX ADMIN — LEVOTHYROXINE SODIUM 100 MCG: 100 TABLET ORAL at 07:26

## 2022-11-01 RX ADMIN — LIDOCAINE HYDROCHLORIDE 6 ML: 10 INJECTION, SOLUTION EPIDURAL; INFILTRATION; INTRACAUDAL; PERINEURAL at 14:15

## 2022-11-01 RX ADMIN — INSULIN GLARGINE 15 UNITS: 100 INJECTION, SOLUTION SUBCUTANEOUS at 20:20

## 2022-11-01 ASSESSMENT — ACTIVITIES OF DAILY LIVING (ADL)
ADLS_ACUITY_SCORE: 22
ADLS_ACUITY_SCORE: 26
ADLS_ACUITY_SCORE: 22
ADLS_ACUITY_SCORE: 22
ADLS_ACUITY_SCORE: 26
ADLS_ACUITY_SCORE: 22
ADLS_ACUITY_SCORE: 22

## 2022-11-01 NOTE — PROGRESS NOTES
"CLINICAL NUTRITION SERVICES - ASSESSMENT NOTE     Nutrition Prescription    RECOMMENDATIONS FOR MDs/PROVIDERS TO ORDER:  - Monitor result of fecal elastase lab; if levels are WNL, pancreatic enzymes likely not needed. If level low, continue Creon as ordered.      - Pancreatic enzyme therapy started yesterday per primary team:   Creon 24, 3 capsules TID with meals (provides ~742 units lipase/kg current weight, meets low end of therapeutic range for PERT with pancreatic insufficiency)     Malnutrition Status:    Patient does not meet two of the established criteria necessary for diagnosing malnutrition but is at risk for malnutrition    Recommendations already ordered by Registered Dietitian (RD):  - Order fecal elastase lab given start of pancreatic enzymes 10/31  - Adjust oral nutrition supplements once PO diet resumed (Ensure Clear and Magic Cup, discontinue Eda Farms ONS)  - Pancreatitis nutrition therapy handouts provided and briefly discussed (though low level of engagement by pt)    Future/Additional Recommendations:  Monitor nutrition-related findings, fecal elastase levels, stooling trends, intake/PO adequacy, and follow pt per protocol     REASON FOR ASSESSMENT  Vernon Ortiz is a/an 62 year old male assessed by the dietitian for Provider Order - \"poor PO intake on calorie counts, Nec panc, assist with optimizing nutrition and need for tube feeds\"    CLINICAL HISTORY  Hx of HLD and hypothyroidism, admitted to [facility] with necrotizing pancreatitis s/p IR drain placement.     NUTRITION HISTORY  Pt reports following a regular diet PTA, no nutrition related concerns reported prior to present admission. Reports decreased appetite with acute hospitalization but working on adequate intake with provider encouragement. Denies abdominal pain with PO. Reports wife brought [employer] yesterday and pt was able to eat his meal which included a sandwich and a soda without issues. Reports new onset diarrhea since admission " "but denies oily-appearance of stool. Pancreatic enzyme therapy started by primary team yesterday. Instructed pt to monitor for improvement to stool consistency with start of enzymes.     CURRENT NUTRITION ORDERS  Diet: NPO this AM but previously on regular diet with oral nutrition supplements and calorie counts per provider    Intake/Tolerance: % intake per I/O documentation.   No intake recorded for skye cts 10/30 but 10/31 intake documented as 2366 Kcals and 88 gm protein with 2 meals via room service and 1 meal from outside of hospital. Supplements Recorded: 100% 1 RADLIVE Standard 1.0, 50% 1 Magic Cup, less than 25% 1 Ensure Shake.     GI    BM x2 on 10/29/22 per I/O. None recorded since this date per I/O flowsheet, however per nursing note last BM 10/31.       BM described as \"formed\" per I/O review      Primary team concerned for possible need to start pancreatic enzymes per pt report of diarrhea, however, given the information available above, likely not indicated at this time (no report of oily stool and documented as formed BM). Fecal elastase level ordered.     LABS    BG trends >200-400 mg/dL over past few days but with A1C 5.6% (WNL) on 10/27/22. Hyperglycemia likely 2/2 acute pancreatic injury.     MEDICATIONS    Creon 24, 3 capsules TID with meals (provides ~742 units lipase/kg current weight, low end of therapeutic range for PERT with pancreatic insufficiency) started 10/31/22    Antibiotics    High sliding scale insulin TID before meals and at bedtime     Synthroid    Senna once daily     SKIN    No deficits noted per nursing documentation      ANTHROPOMETRICS  Height: 182.9 cm (6' 0\")  Admit wt 107.9 kg (237 lbs) 10/27/22  Most Recent Weight: 97.1 kg (214 lb)  IBW: 80.9 kg  120%IBW   BMI: Overweight BMI 25-29.9    Weight History:   - No additional wt hx available per Care Everywhere review  - Weight down 10.8 kg (10%) since admission but denies wt loss PTA.    Wt Readings from Last 15 " Encounters:   11/01/22 97.1 kg (214 lb)       Dosing Weight: 85 kg (adjusted per current wt 97.1 kg and IBW 80.9 kg)    ASSESSED NUTRITION NEEDS  Estimated Energy Needs: 2772-8564 kcals/day (25 - 30 kcals/kg)  Justification: Maintenance  Estimated Protein Needs: 100-130+ grams protein/day (1.2 - 1.5+ grams of pro/kg)  Justification: Increased needs per acute nec panc  Estimated Fluid Needs: 1652-7875 mL/day (1 mL/kcal)   Justification: Maintenance    PHYSICAL FINDINGS  See malnutrition section below.    MALNUTRITION  % Intake: Decreased intake does not meet criteria  % Weight Loss: > 2% in 1 week (severe)  Subcutaneous Fat Loss: None observed  Muscle Loss: None observed  Fluid Accumulation/Edema: None noted  Malnutrition Diagnosis: Patient does not meet two of the established criteria necessary for diagnosing malnutrition but is at risk for malnutrition    NUTRITION DIAGNOSIS  Inadequate oral intake related to decreased appetite due to acute condition as evidenced by pt diet hx, weight loss >2% over past week but denies nutrition-related concern PTA.       INTERVENTIONS  Implementation  Nutrition education for nutrition relationship to health/disease - Pancreatitis nutrition therapy, low fat diet sample meal plan  Medical food supplement therapy - adjust per preference  Labs: Fecal elastase ordered     Goals  1. Patient to consume % of nutritionally adequate meal trays TID, or the equivalent with supplements/snacks.  2. Maintain weight >97 kg     Monitoring/Evaluation  Progress toward goals will be monitored and evaluated per protocol.  Nick Mcgregor RDN, LD, CNSC  6B RD pager: [phone] work-room RD phone: [phone]    Weekend/Holiday RD pager [phone]

## 2022-11-01 NOTE — PLAN OF CARE
Occupational Therapy Discharge Summary    Reason for therapy discharge:    Discharged to home.    Progress towards therapy goal(s). See goals on Care Plan in [facility] electronic health record for goal details.  Goals met    Therapy recommendation(s):    No further therapy is recommended. Pt presenting near functional baseline for ADL tasks, functional mobility, overall activity tolerance. Pt has no additional questions/concerns regarding self-care tasks prior to discharge home and will have assist from family. No further acute OT needs identified, will complete orders. Please re-consult if needs arise.

## 2022-11-01 NOTE — PROGRESS NOTES
Seen and examined with GI fellow, agree with findings and recommendations.    Juan Santos MD GI Staff
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
## 2022-11-01 NOTE — PROGRESS NOTES
Gastroenterology Follow up Note         Assessment and Plan:     Vernon Ortiz is a 62 year old male with a history of hyperlipidemia and hypothyroidism was admitted on 10/26 from OSH for necrotizing pancreatitis with concern for infected pancreatic necrosis.      #Acute necrotizing pancreatitis  - Etiology: Trauma, no gallstones, normal TG. No gallstone on admission US. However, Elevated LFTs in OSH (ALT 62, TB 2). Normal LFTs now.   - Index CT abdomen: 10/25/22   - Interventions:  IR drainage 10/26/22: 12 F pigtail drain was placed.   IR sinogram 11/1/22: Largely decompressed irregular collection in the area of the pancreatic body. No enteric fistula is identified. Existing pancreatic drain was upsized to a 14 [place] locking pigtail catheter.   - Diet: Regular   - Exocrine insuffiencey: Diarrhea, fecal elastase 5.2, started on Creon.   - Endocrine insufficiency: Hyperglycemia, A1C on admission 5.6% on insulin.   - Abx: Currently on metronidazole 500 mg TID, ciprofloxacin 500 mg BID    #Infected pancreatic necrotic collection s/p IR drain placement 10/26/22  - CT imaging on admission showing large, complex necrotic collection around 4 cm along the superior aspect of the pancreatic body/tail junction containing gas and fluid concerning for infection.   - Plan was for IR drainage as collection too immature to be drained by GI, and no retroperitoneal window to be candidate for VARD by Surgery. IR drain was placed successfully 10/26/22. Collection would need to further mature prior to any planned surgical debridement.   - The patient had worsening leukocytosis and CT abdomen 10/30/22 that showed increased peripancreatic gas, fluid, and fat stranding. Plan for sinogram   - Positive fluid cultures for E.Coli          Recommendations:   - Follow Sinogram   - Follow infection clinically, continue antibiotics.    - Pain management per primary team.  - No endoscopic interventions from GI standpoint at this time.   - GI will  continue to follow up with you.     It has been a pleasure to participate in the care of this patient.  Patient discussed with GI staff, Dr. Jones. Please do not hesitate to contact the GI service with any questions or concerns.     Anand Apodaca MD  GI Fellow          Subjective/Objective:   - No acute events overnight. NPO for sinogram, but able to tolerate diet the day before.            Medications:     Current Facility-Administered Medications   Medication     acetaminophen (TYLENOL) tablet 650 mg     [Held by provider] amylase-lipase-protease (CREON 24) 35374-57466 units per EC capsule 3 capsule     ciprofloxacin (CIPRO) tablet 500 mg     glucose gel 15-30 g    Or     dextrose 50 % injection 25-50 mL    Or     glucagon injection 1 mg     hydrALAZINE (APRESOLINE) injection 10 mg     HYDROmorphone (DILAUDID) tablet 2 mg     insulin aspart (NovoLOG) injection (RAPID ACTING)     insulin aspart (NovoLOG) injection (RAPID ACTING)     [START ON 11/2/2022] insulin aspart (NovoLOG) injection (RAPID ACTING)     insulin aspart (NovoLOG) injection (RAPID ACTING)     insulin aspart (NovoLOG) injection (RAPID ACTING)     insulin aspart (NovoLOG) injection (RAPID ACTING)     insulin glargine (LANTUS PEN) injection 15 Units     ipratropium - albuterol 0.5 mg/2.5 mg/3 mL (DUONEB) neb solution 3 mL     levothyroxine (SYNTHROID/LEVOTHROID) tablet 100 mcg     lidocaine (LMX4) cream     lidocaine 1 % 0.1-1 mL     melatonin tablet 1 mg     metroNIDAZOLE (FLAGYL) tablet 500 mg     naloxone (NARCAN) injection 0.2 mg    Or     naloxone (NARCAN) injection 0.4 mg    Or     naloxone (NARCAN) injection 0.2 mg    Or     naloxone (NARCAN) injection 0.4 mg     polyethylene glycol (MIRALAX) Packet 17 g     prochlorperazine (COMPAZINE) injection 10 mg    Or     prochlorperazine (COMPAZINE) tablet 10 mg    Or     prochlorperazine (COMPAZINE) suppository 25 mg     senna-docusate (SENOKOT-S/PERICOLACE) 8.6-50 MG per tablet 1 tablet      senna-docusate (SENOKOT-S/PERICOLACE) 8.6-50 MG per tablet 1 tablet    Or     senna-docusate (SENOKOT-S/PERICOLACE) 8.6-50 MG per tablet 2 tablet     sodium chloride (PF) 0.9% PF flush 10 mL     sodium chloride (PF) 0.9% PF flush 3 mL     sodium chloride (PF) 0.9% PF flush 3 mL            Physical Exam:   VS:  /84 (BP Location: Right arm, Cuff Size: Adult Large)   Pulse 73   Temp 97.9  F (36.6  C) (Oral)   Resp 18   Ht 1.829 m (6')   Wt 97.1 kg (214 lb)   SpO2 96%   BMI 29.02 kg/m      Wt:   Wt Readings from Last 2 Encounters:   11/01/22 97.1 kg (214 lb)      Constitutional: cooperative, pleasant, no acute distress  Eyes: Conjunctiva anicteric  HEENT: Normal oropharynx without ulcers or exudate, mucus membranes moist  CV: RRR, no m/r/g  Respiratory: CTAB  Abd:  Nondistended, +bs, no hepatosplenomegaly, nontender, no rebound or guarding   Skin: warm, perfused, no jaundice  Neuro: AAO x 3, No asterixis         Laboratory:   BMP  Recent Labs   Lab 11/01/22  1258 11/01/22  0725 11/01/22  0519 11/01/22  0011 10/31/22  1451 10/31/22  0518 10/30/22  0520 10/29/22  0857   NA  --   --  134*  --   --  134* 136 132*   POTASSIUM  --   --  4.3  --   --  4.8 4.3 4.4   CHLORIDE  --   --  100  --   --  101 100 99   DOLORES  --   --  7.9*  --   --  8.0* 7.6* 7.7*   CO2  --   --  19*  --   --  20* 24 22   BUN  --   --  14.9  --   --  12.6 11.9 14.6   CR  --   --  0.92  --   --  0.91 0.94 0.86   * 290* 295* 294*   < > 251* 215* 176*    < > = values in this interval not displayed.     CBC  Recent Labs   Lab 11/01/22  0519 10/31/22  0518 10/30/22  0520 10/29/22  0518   WBC 26.3* 22.9* 18.4* 14.4*   RBC 4.78 4.71 4.34* 4.46   HGB 14.1 14.1 12.9* 13.4   HCT 42.5 42.7 39.6* 40.9   MCV 89 91 91 92   MCH 29.5 29.9 29.7 30.0   MCHC 33.2 33.0 32.6 32.8   RDW 14.3 14.4 14.0 13.9    166 176 166     INRNo lab results found in last 7 days.  LFTs  Recent Labs   Lab 10/29/22  0518 10/28/22  0547 10/27/22  0600   ALKPHOS 64 48  47   AST 25 30 49   ALT 22 29 42   BILITOTAL 1.0 0.7 0.9   PROTTOTAL 5.7* 5.7* 5.6*   ALBUMIN 2.8* 2.8* 2.9*      PANCNo lab results found in last 7 days.         Imaging/Procedures/Studies:   No results found for this or any previous visit.    IR Sinogram 11/1/22   TECHNIQUE/FINDINGS: Patient was positioned in the supine on the  fluoroscopy table. Injection through the existing 12 [place]in  retroperitoneal drain with an anterior approach demonstrated  continuity of the pigtail with a very small collection. Multiple  projections obtained of the collection with multiple strandy areas of  opacification compatible with the irregularity noted within the gas  containing collection on CT. No evidence of enteric fistula. Actual  volume of the collection was relatively small.     Drain and surrounding abdomen were prepped and draped in usual sterile  manner. Lidocaine 1% without epinephrine was used for local  anesthesia. The existing drain was cut and wire access was made inside  the collection with a 035 floppy-tipped Green wire. Over the wire,  the existing drain was removed under fluoroscopy. A 4 [place] Kumpe  catheter was used to direct the wire into an optimal position. A 14  [place] locking pigtail catheter was advanced over the wire under  fluoroscopy. Pigtail was formed. Location confirmed with contrast  injection. Drain was aspirated, with return of thin contrast and dark  blood products. New drain was secured with a 2-0 nylon suture and  sterile dressing. Attached to bag drainage..                                                                      IMPRESSION:  Sinogram showed largely decompressed irregular collection in the area  of the pancreatic body. No enteric fistula is identified. Existing  pancreatic drain was upsized to a 14 [place]in locking pigtail catheter  and is now fully optimized. Minimal fluid could be aspirated,  consistent with a predominantly gas containing collection.     PLAN:  Continue routine  drain aftercare    CT abdomen/pelvis 10/30/2022    IMPRESSION:  1. Sequela of necrotizing pancreatitis with new peripancreatic drain  in place. There is slightly increased peripancreatic gas, fluid, and  fat stranding without discrete focal enhancing collection to suggest  abscess. No evidence of viscus perforation or vascular injury adjacent  to the pancreas.  2. Unchanged reactive fluid along the pericolic gutters and small  volume free pelvic fluid. Stable reactive changes of the colon in the  upper pericolic gutters.  3. Colonic diverticulosis without evidence of diverticulitis.

## 2022-11-01 NOTE — PROGRESS NOTES
ICU End of Shift Summary. See flowsheets for vital signs and detailed assessment.    Changes this shift: Pt alert, oriented x4. Denies pain, ambulates independently to bathroom, voids spontaneously, afebrile, not on tele, SBP < 180, on RA, on regulear diet, abdominal drain with 25mL/4hr output. Pt NPO since midnight in preparation for planned Sonogram. BGLs > 300, medium sliding scale insulin changed to high resistance sliding scale insulin.     Plan: Continue prep for Sonogram

## 2022-11-01 NOTE — IR NOTE
Patient Name: Vernon Ortiz  Medical Record Number: [id number]  Today's Date: 11/1/2022    Procedure: Drain sinogram and exchange  Proceduralist: Dr. Paul Spencer    Procedure Start: 1348  Procedure end: 1428  Sedation medications administered: None     Report given to: [facility] 6B  : DIAZ    Other Notes: Pt arrived to IR room 4 from 6B. Consent reviewed. Pt denies any questions or concerns regarding procedure. Pt positioned supine and monitored per protocol. Pt tolerated procedure without any noted complications. Pt transferred back to 6B.

## 2022-11-01 NOTE — PROCEDURES
[facility]    Procedure: Sinogram and upsize of existing pancreatic drain    Date/Time: 11/1/2022 2:37 PM  Performed by: Robbie Miller DO  Authorized by: Robbie Miller DO       UNIVERSAL PROTOCOL   Site Marked: NA  Prior Images Obtained and Reviewed:  Yes  Required items: Required blood products, implants, devices and special equipment available    Patient identity confirmed:  Verbally with patient, arm band, provided demographic data and hospital-assigned identification number  Patient was reevaluated immediately before administering moderate or deep sedation or anesthesia  Confirmation Checklist:  Patient's identity using two indicators, relevant allergies, procedure was appropriate and matched the consent or emergent situation and correct equipment/implants were available  Time out: Immediately prior to the procedure a time out was called    Universal Protocol: the Joint Commission Universal Protocol was followed    Preparation: Patient was prepped and draped in usual sterile fashion       ANESTHESIA    Anesthesia: Local infiltration  Local Anesthetic:  Lidocaine 1% without epinephrine      SEDATION    Patient Sedated: No    See dictated procedure note for full details.  Findings: Sinogram through existing 12 Fr drain showed a very small collection with irregular projections within the expected area of the necrotic pancreatic tissue. No enteric fistula identified. Tube optimized by up-sizing to a 14 Fr locking pigtail    Specimens: none    Complications: None    Condition: Stable    Plan: Routine drain cares.      PROCEDURE    Patient Tolerance:  Patient tolerated the procedure well with no immediate complications  Length of time physician/provider present for 1:1 monitoring during sedation: 0

## 2022-11-01 NOTE — PROGRESS NOTES
IR follow-up note.    This is a 62 year old male with a history of hyperlipidemia and hypothyroidism was admitted on 10/26 from OSH for necrotizing pancreatitis with concern for infected necrosis. Now s/p IR 12F drain placement into pancreatic necrosis 10/26. Pt with worsening leukocytosis and CT scan 10/30 that shows slightly increased peripancreatic gas, fluid and fat stranding without discrete focal enhancing collection to suggest abscess. IR was contacted by GI to review the CT scan and make recommendations regarding drain management. Additionally there is question of fistula to bowel due to the increased gas component of the collection.    Case and imaging was reviewed with Dr. Price, Dr. Trejo and Dr. Rogers from IR. IR will proceed with Sinogram and possible drain exchange. It is unclear that IR will be able to optimize the current drain further or improve drainage of this complex collection. The collect is mainly air. There is nothing on imaging to suggest fistula to surround structures though this may be better characterized by sinogram.    Recommendations were discussed with Dr. Solorio and Anai Harley PA-C. NPO order placed for IR procedure 11/1.    Betina Sahu DNP, APRN  Interventional Radiology   IR on-call pager: [phone]

## 2022-11-01 NOTE — CONSULTS
Patient and spouse received drain education on 10/28. They decline need for repeat education. PLC consult discontinued.

## 2022-11-01 NOTE — PROGRESS NOTES
Calorie Count  Intake recorded for: 10/31  Total Kcals: 2366 Total Protein: 88g  Kcals from Hospital Food: 1216  Protein: 47g  Kcals from Outside Food (average):1150 Protein: 41g  # Meals Ordered from Kitchen: 2 meals + food from outside the hospital   # Meals Recorded: 3 meals (First - 100% cheerios, 1% milk, 50% Greek yogurt)     (Second - 100% strawberry banana smoothie from hospital coffee shop, 25% scrambled eggs)      (Third - 100% small Coke, strawberry milkshake, chicken sandwich [facility])  # Supplements Recorded: 100% 1 [employer] Standard 1.0, 50% 1 Magic Cup, less than 25% 1 Ensure Shake

## 2022-11-01 NOTE — CONSULTS
Patient is insured through [facility].    Covered glucometers (per 100 strips)   Accu-Chek Dawn $38.   Accu-Chek Guide $10.   Contour Next Not covered.   Freestyle Lite Not covered.   One Touch Ultra $32.   One Touch Verio $17.       Freestyle Adrien 2 Not covered.       Rapid-acting insulin    Novolog Flexpens $50 per 30 day supply, or $100 per 90 days   Humalog Kwikpens Not covered.       Intermediate    Novolin N Flexpens $50 per 30 day supply, or $100 per 90 days   Humulin N Kwikpens Not covered.       Basal    Lantus Solostar Not covered.   Semglee-ygfn pens Not covered.   Basaglar pens $35 per fill.   Levemir pens $50 per 30 day supply, or $100 per 90 days     Tess Mcqueen  Pharmacy Technician/Liaison, Discharge Pharmacy   [phone] (voice or text)  [email or web address]

## 2022-11-01 NOTE — PROGRESS NOTES
NURSING PROGRESS NOTE  Shift Summary      Date: October 31, 2022     Neuro/Musculoskeletal:  A&Ox4. Afebrile.  Cardiac:  SR.  VSS.     Respiratory:  Sating in the 90s on RA.  GI/:  Adequate urine output.  LBM: 10/31/22.  Diet/Appetite:  Tolerating regular diet.  Activity:  Independent.  Pain:  Denies.   Skin:  No new deficits noted.   LDAs + Drips/IVF:  Bilateral PIVs are patent and saline locked.  Protocols/Labs:  Phos was not replaced today.    Pertinent Shift Updates:  Patient was started on Insulin Aspart today, and Creon. Education was given for both medications and he expressed understanding.       Plan:  Patient will be going to IR tomorrow 11/01/22 for a procedure, and will be NPO at midnight.       Koffi Mckee RN  .................................................... October 31, 2022   7:53 PM  [facility] ([facility]): [facility] ICU (Unit 6D)

## 2022-11-02 LAB
ANION GAP SERPL CALCULATED.3IONS-SCNC: 14 MMOL/L (ref 7–15)
BUN SERPL-MCNC: 17.5 MG/DL (ref 8–23)
C PEPTIDE SERPL-MCNC: 2.1 NG/ML (ref 0.9–6.9)
CALCIUM SERPL-MCNC: 7.7 MG/DL (ref 8.8–10.2)
CHLORIDE SERPL-SCNC: 99 MMOL/L (ref 98–107)
CREAT SERPL-MCNC: 0.9 MG/DL (ref 0.67–1.17)
DEPRECATED HCO3 PLAS-SCNC: 19 MMOL/L (ref 22–29)
ELASTASE PANC STL-MCNT: 5.2 UG/G
ERYTHROCYTE [DISTWIDTH] IN BLOOD BY AUTOMATED COUNT: 14.4 % (ref 10–15)
GFR SERPL CREATININE-BSD FRML MDRD: >90 ML/MIN/1.73M2
GLUCOSE BLDC GLUCOMTR-MCNC: 260 MG/DL (ref 70–99)
GLUCOSE BLDC GLUCOMTR-MCNC: 271 MG/DL (ref 70–99)
GLUCOSE BLDC GLUCOMTR-MCNC: 278 MG/DL (ref 70–99)
GLUCOSE BLDC GLUCOMTR-MCNC: 326 MG/DL (ref 70–99)
GLUCOSE BLDC GLUCOMTR-MCNC: 356 MG/DL (ref 70–99)
GLUCOSE BLDC GLUCOMTR-MCNC: 358 MG/DL (ref 70–99)
GLUCOSE BLDC GLUCOMTR-MCNC: 359 MG/DL (ref 70–99)
GLUCOSE BLDC GLUCOMTR-MCNC: 360 MG/DL (ref 70–99)
GLUCOSE SERPL-MCNC: 285 MG/DL (ref 70–99)
HCT VFR BLD AUTO: 41.5 % (ref 40–53)
HGB BLD-MCNC: 13.8 G/DL (ref 13.3–17.7)
MCH RBC QN AUTO: 29.5 PG (ref 26.5–33)
MCHC RBC AUTO-ENTMCNC: 33.3 G/DL (ref 31.5–36.5)
MCV RBC AUTO: 89 FL (ref 78–100)
PHOSPHATE SERPL-MCNC: 3 MG/DL (ref 2.5–4.5)
PLATELET # BLD AUTO: 297 10E3/UL (ref 150–450)
POTASSIUM SERPL-SCNC: 4.4 MMOL/L (ref 3.4–5.3)
RBC # BLD AUTO: 4.68 10E6/UL (ref 4.4–5.9)
SODIUM SERPL-SCNC: 132 MMOL/L (ref 136–145)
WBC # BLD AUTO: 26.8 10E3/UL (ref 4–11)

## 2022-11-02 PROCEDURE — 120N000011 HC R&B TRANSPLANT UMMC

## 2022-11-02 PROCEDURE — 99233 SBSQ HOSP IP/OBS HIGH 50: CPT | Performed by: STUDENT IN AN ORGANIZED HEALTH CARE EDUCATION/TRAINING PROGRAM

## 2022-11-02 PROCEDURE — 36415 COLL VENOUS BLD VENIPUNCTURE: CPT | Performed by: PHYSICIAN ASSISTANT

## 2022-11-02 PROCEDURE — 250N000013 HC RX MED GY IP 250 OP 250 PS 637: Performed by: PHYSICIAN ASSISTANT

## 2022-11-02 PROCEDURE — 85027 COMPLETE CBC AUTOMATED: CPT | Performed by: PHYSICIAN ASSISTANT

## 2022-11-02 PROCEDURE — 99207 PR APP CREDIT; MD BILLING SHARED VISIT: CPT | Performed by: PHYSICIAN ASSISTANT

## 2022-11-02 PROCEDURE — 80048 BASIC METABOLIC PNL TOTAL CA: CPT | Performed by: PHYSICIAN ASSISTANT

## 2022-11-02 PROCEDURE — 84100 ASSAY OF PHOSPHORUS: CPT | Performed by: STUDENT IN AN ORGANIZED HEALTH CARE EDUCATION/TRAINING PROGRAM

## 2022-11-02 RX ADMIN — METRONIDAZOLE 500 MG: 500 TABLET ORAL at 13:00

## 2022-11-02 RX ADMIN — METRONIDAZOLE 500 MG: 500 TABLET ORAL at 21:01

## 2022-11-02 RX ADMIN — PANCRELIPASE 3 CAPSULE: 24000; 76000; 120000 CAPSULE, DELAYED RELEASE PELLETS ORAL at 13:49

## 2022-11-02 RX ADMIN — INSULIN ASPART 1 UNITS: 100 INJECTION, SOLUTION INTRAVENOUS; SUBCUTANEOUS at 13:52

## 2022-11-02 RX ADMIN — SENNOSIDES AND DOCUSATE SODIUM 1 TABLET: 8.6; 5 TABLET ORAL at 08:06

## 2022-11-02 RX ADMIN — METRONIDAZOLE 500 MG: 500 TABLET ORAL at 08:07

## 2022-11-02 RX ADMIN — CIPROFLOXACIN HYDROCHLORIDE 500 MG: 500 TABLET, FILM COATED ORAL at 21:01

## 2022-11-02 RX ADMIN — PANCRELIPASE 3 CAPSULE: 24000; 76000; 120000 CAPSULE, DELAYED RELEASE PELLETS ORAL at 19:11

## 2022-11-02 RX ADMIN — LEVOTHYROXINE SODIUM 100 MCG: 100 TABLET ORAL at 08:07

## 2022-11-02 RX ADMIN — CIPROFLOXACIN HYDROCHLORIDE 500 MG: 500 TABLET, FILM COATED ORAL at 08:07

## 2022-11-02 ASSESSMENT — ACTIVITIES OF DAILY LIVING (ADL)
ADLS_ACUITY_SCORE: 22

## 2022-11-02 NOTE — PROGRESS NOTES
Gastroenterology Follow up Note         Assessment and Plan:     Vernon Ortiz is a 62 year old male with a history of hyperlipidemia and hypothyroidism was admitted on 10/26 from OSH for necrotizing pancreatitis with concern for infected pancreatic necrosis.      #Acute necrotizing pancreatitis  - Etiology: Trauma, no gallstones, normal TG. No gallstone on admission US. However, Elevated LFTs in OSH (ALT 62, TB 2). Normal LFTs now.   - Index CT abdomen: 10/25/22   - Interventions:  IR drainage 10/26/22: 12 F pigtail drain was placed.   IR sinogram 11/1/22: Largely decompressed irregular collection in the area of the pancreatic body. No enteric fistula is identified. Existing pancreatic drain was upsized to a 14 Belizean locking pigtail catheter.   - Diet: Regular   - Exocrine insuffiencey: Diarrhea, fecal elastase 5.2, started on Creon.   - Endocrine insufficiency: Hyperglycemia, A1C on admission 5.6% on insulin.   - Abx: Currently on metronidazole 500 mg TID, ciprofloxacin 500 mg BID    #Infected pancreatic necrotic collection s/p IR drain placement 10/26/22  - CT imaging on admission showing large, complex necrotic collection around 4 cm along the superior aspect of the pancreatic body/tail junction containing gas and fluid concerning for infection.   - Plan was for IR drainage as collection too immature to be drained by GI, and no retroperitoneal window to be candidate for VARD by Surgery. IR drain was placed successfully 10/26/22. Collection would need to further mature prior to any planned surgical debridement.   - The patient had worsening leukocytosis and CT abdomen 10/30/22 that showed increased peripancreatic gas, fluid, and fat stranding. He had IR sinogram that did not show much increased collection and drain   - Positive fluid cultures for E.Coli          Recommendations:     - Follow infection clinically, continue antibiotics.    - Pain management per primary team.  - No endoscopic interventions from GI  standpoint at this time.   - GI will continue to follow up with you.     It has been a pleasure to participate in the care of this patient.  Patient discussed with GI staff, Dr. Jones. Please do not hesitate to contact the GI service with any questions or concerns.     Anand Apodaca MD  GI Fellow          Subjective/Objective:   - No acute events overnight  Doing well, able to tolerate diet. Has diarrhea. No abdominal pain, denies fever or other symptoms.     I/O             Medications:     Current Facility-Administered Medications   Medication     acetaminophen (TYLENOL) tablet 650 mg     amylase-lipase-protease (CREON 24) 25670-41175 units per EC capsule 3 capsule     ciprofloxacin (CIPRO) tablet 500 mg     glucose gel 15-30 g    Or     dextrose 50 % injection 25-50 mL    Or     glucagon injection 1 mg     hydrALAZINE (APRESOLINE) injection 10 mg     HYDROmorphone (DILAUDID) tablet 2 mg     insulin aspart (NovoLOG) injection (RAPID ACTING)     insulin aspart (NovoLOG) injection (RAPID ACTING)     insulin aspart (NovoLOG) injection (RAPID ACTING)     insulin aspart (NovoLOG) injection (RAPID ACTING)     insulin aspart (NovoLOG) injection (RAPID ACTING)     insulin aspart (NovoLOG) injection (RAPID ACTING)     insulin glargine (LANTUS PEN) injection 24 Units     ipratropium - albuterol 0.5 mg/2.5 mg/3 mL (DUONEB) neb solution 3 mL     levothyroxine (SYNTHROID/LEVOTHROID) tablet 100 mcg     lidocaine (LMX4) cream     lidocaine 1 % 0.1-1 mL     melatonin tablet 1 mg     metroNIDAZOLE (FLAGYL) tablet 500 mg     naloxone (NARCAN) injection 0.2 mg    Or     naloxone (NARCAN) injection 0.4 mg    Or     naloxone (NARCAN) injection 0.2 mg    Or     naloxone (NARCAN) injection 0.4 mg     polyethylene glycol (MIRALAX) Packet 17 g     prochlorperazine (COMPAZINE) injection 10 mg    Or     prochlorperazine (COMPAZINE) tablet 10 mg    Or     prochlorperazine (COMPAZINE) suppository 25 mg     senna-docusate  (SENOKOT-S/PERICOLACE) 8.6-50 MG per tablet 1 tablet     senna-docusate (SENOKOT-S/PERICOLACE) 8.6-50 MG per tablet 1 tablet    Or     senna-docusate (SENOKOT-S/PERICOLACE) 8.6-50 MG per tablet 2 tablet     sodium chloride (PF) 0.9% PF flush 10 mL     sodium chloride (PF) 0.9% PF flush 10 mL     sodium chloride (PF) 0.9% PF flush 3 mL     sodium chloride (PF) 0.9% PF flush 3 mL            Physical Exam:   VS:  /82 (BP Location: Left arm)   Pulse 95   Temp 98.2  F (36.8  C) (Oral)   Resp 16   Ht 1.829 m (6')   Wt 96.1 kg (211 lb 12.8 oz)   SpO2 98%   BMI 28.73 kg/m      Wt:   Wt Readings from Last 2 Encounters:   11/02/22 96.1 kg (211 lb 12.8 oz)      Constitutional: cooperative, pleasant, no acute distress  Eyes: Conjunctiva anicteric  HEENT: Normal oropharynx without ulcers or exudate, mucus membranes moist  CV: RRR, no m/r/g  Respiratory: CTAB  Abd:  Nondistended, +bs, no hepatosplenomegaly, nontender, no rebound or guarding   Skin: warm, perfused, no jaundice  Neuro: AAO x 3, No asterixis         Laboratory:   [facility]  Recent Labs   Lab 11/02/22  1210 11/02/22  0804 11/02/22  0523 11/02/22  0513 11/01/22  0725 11/01/22  0519 10/31/22  1451 10/31/22  0518 10/30/22  0520   NA  --   --   --  132*  --  134*  --  134* 136   POTASSIUM  --   --   --  4.4  --  4.3  --  4.8 4.3   CHLORIDE  --   --   --  99  --  100  --  101 100   DOLORES  --   --   --  7.7*  --  7.9*  --  8.0* 7.6*   CO2  --   --   --  19*  --  19*  --  20* 24   BUN  --   --   --  17.5  --  14.9  --  12.6 11.9   CR  --   --   --  0.90  --  0.92  --  0.91 0.94   * 278* 260* 285*   < > 295*   < > 251* 215*    < > = values in this interval not displayed.     CBC  Recent Labs   Lab 11/02/22  0513 11/01/22  0519 10/31/22  0518 10/30/22  0520   WBC 26.8* 26.3* 22.9* 18.4*   RBC 4.68 4.78 4.71 4.34*   HGB 13.8 14.1 14.1 12.9*   HCT 41.5 42.5 42.7 39.6*   MCV 89 89 91 91   MCH 29.5 29.5 29.9 29.7   MCHC 33.3 33.2 33.0 32.6   RDW 14.4 14.3 14.4 14.0     241 166 176     INRNo lab results found in last 7 days.  LFTs  Recent Labs   Lab 10/29/22  0518 10/28/22  0547 10/27/22  0600   ALKPHOS 64 48 47   AST 25 30 49   ALT 22 29 42   BILITOTAL 1.0 0.7 0.9   PROTTOTAL 5.7* 5.7* 5.6*   ALBUMIN 2.8* 2.8* 2.9*      PANCNo lab results found in last 7 days.         Imaging/Procedures/Studies:   No results found for this or any previous visit.    IR Sinogram 11/1/22   TECHNIQUE/FINDINGS: Patient was positioned in the supine on the  fluoroscopy table. Injection through the existing 12 O[place]i  retroperitoneal drain with an anterior approach demonstrated  continuity of the pigtail with a very small collection. Multiple  projections obtained of the collection with multiple strandy areas of  opacification compatible with the irregularity noted within the gas  containing collection on CT. No evidence of enteric fistula. Actual  volume of the collection was relatively small.     Drain and surrounding abdomen were prepped and draped in usual sterile  manner. Lidocaine 1% without epinephrine was used for local  anesthesia. The existing drain was cut and wire access was made inside  the collection with a 035 floppy-tipped Green wire. Over the wire,  the existing drain was removed under fluoroscopy. A 4 Omani Kumpe  catheter was used to direct the wire into an optimal position. A 14  Omani locking pigtail catheter was advanced over the wire under  fluoroscopy. Pigtail was formed. Location confirmed with contrast  injection. Drain was aspirated, with return of thin contrast and dark  blood products. New drain was secured with a 2-0 nylon suture and  sterile dressing. Attached to bag drainage..                                                                      IMPRESSION:  Sinogram showed largely decompressed irregular collection in the area  of the pancreatic body. No enteric fistula is identified. Existing  pancreatic drain was upsized to a 14 Omani locking pigtail  catheter  and is now fully optimized. Minimal fluid could be aspirated,  consistent with a predominantly gas containing collection.     PLAN:  Continue routine drain aftercare    CT abdomen/pelvis 10/30/2022    IMPRESSION:  1. Sequela of necrotizing pancreatitis with new peripancreatic drain  in place. There is slightly increased peripancreatic gas, fluid, and  fat stranding without discrete focal enhancing collection to suggest  abscess. No evidence of viscus perforation or vascular injury adjacent  to the pancreas.  2. Unchanged reactive fluid along the pericolic gutters and small  volume free pelvic fluid. Stable reactive changes of the colon in the  upper pericolic gutters.  3. Colonic diverticulosis without evidence of diverticulitis.

## 2022-11-02 NOTE — PLAN OF CARE
/83 (BP Location: Left arm)   Pulse 94   Temp 98.6  F (37  C) (Oral)   Resp 18   Ht 1.829 m (6')   Wt 97.1 kg (214 lb)   SpO2 96%   BMI 29.02 kg/m      Vitally stable. Independent in room and halls. Drain on left side. Small amount of brown drainage. ACHS sugars running high. MD aware, order placed for additional insulin at 0200. See mar. Denies pain.
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
## 2022-11-02 NOTE — PROGRESS NOTES
NURSING PROGRESS NOTE  Shift Summary      Date: November 1, 2022     Neuro/Musculoskeletal:  A&Ox4. Afebrile.  Cardiac:  SR.  VSS.     Respiratory:  Sating in the 90s on RA.  GI/:  Adequate urine output.  LBM: 11/1/22, loose and gelatinous.  Diet/Appetite:  Tolerating regular diet.  Activity:  Independent.   Pain:  Denies.   Skin:  No new deficits noted.   LDAs + Drips/IVF:  Left PIV is patent and saline locked.  Protocols/Labs:  Phosphorus was not replaced today.    Pertinent Shift Updates:  Patient went to IR for a Sinogram today where his abdominal drain was increased to a 14Fr lumen. He has not c/o any pain.       Plan:  Patient is moving to  for further care.      Koffi Mckee RN  .................................................... November 1, 2022   8:02 PM  [facility] ([facility]): [facility] [place] ICU (Unit 6D)

## 2022-11-02 NOTE — PLAN OF CARE
/78 (BP Location: Left arm)   Pulse 90   Temp 98.4  F (36.9  C) (Oral)   Resp 16   Ht 1.829 m (6')   Wt 96.1 kg (211 lb 12.8 oz)   SpO2 95%   BMI 28.73 kg/m         [id number]  AVSS on RA. BS ACHS with carb coverage.  Carb consistent diet, good appetite and PO intake. BS remained in 300's for most of day- Sliding scale has been adjusted this evening- anticipating better control over blood sugars moving forward. Denies pain and nausea. Skater drain on left upper abdomen; flushed Q8 hrs (per order). Small output- remains brown/ pink tinged. Wife by bedside- good support system. Wife and patient are anticipating patient will be discharged with drain when medically ready for discharge. Have been watching nurse empty and flush it. They are both very involved in care plan. Will need diabetic education prior to discharge. BM today. Adequate urine output; not saving. Ambulated in hallway throughout day- no issues ambulating. PIV saline locked. Continue plan of care, please notify MD with any changes.

## 2022-11-02 NOTE — PROGRESS NOTES
Calorie Count  Intake recorded for: 11/1  Total Kcals: 844 Total Protein: 34g  Kcals from Hospital Food: 844  Protein: 34g  Kcals from Outside Food (average):0 Protein: 0g  # Meals Ordered from Kitchen: 2 meals   # Meals Recorded: 1 meal (First - 100% sausage pizza, fruit ice, diet coke)  # Supplements Recorded: 0

## 2022-11-03 LAB
BACTERIA FLD CULT: NORMAL
C PEPTIDE SERPL-MCNC: 2.4 NG/ML (ref 0.9–6.9)
ERYTHROCYTE [DISTWIDTH] IN BLOOD BY AUTOMATED COUNT: 14.1 % (ref 10–15)
GLUCOSE BLDC GLUCOMTR-MCNC: 246 MG/DL (ref 70–99)
GLUCOSE BLDC GLUCOMTR-MCNC: 264 MG/DL (ref 70–99)
GLUCOSE BLDC GLUCOMTR-MCNC: 300 MG/DL (ref 70–99)
GLUCOSE BLDC GLUCOMTR-MCNC: 323 MG/DL (ref 70–99)
GLUCOSE BLDC GLUCOMTR-MCNC: 325 MG/DL (ref 70–99)
HCT VFR BLD AUTO: 39.8 % (ref 40–53)
HGB BLD-MCNC: 13.3 G/DL (ref 13.3–17.7)
MCH RBC QN AUTO: 29.9 PG (ref 26.5–33)
MCHC RBC AUTO-ENTMCNC: 33.4 G/DL (ref 31.5–36.5)
MCV RBC AUTO: 89 FL (ref 78–100)
PHOSPHATE SERPL-MCNC: 2.9 MG/DL (ref 2.5–4.5)
PLATELET # BLD AUTO: 293 10E3/UL (ref 150–450)
RBC # BLD AUTO: 4.45 10E6/UL (ref 4.4–5.9)
WBC # BLD AUTO: 21.7 10E3/UL (ref 4–11)

## 2022-11-03 PROCEDURE — 36415 COLL VENOUS BLD VENIPUNCTURE: CPT | Performed by: STUDENT IN AN ORGANIZED HEALTH CARE EDUCATION/TRAINING PROGRAM

## 2022-11-03 PROCEDURE — 86140 C-REACTIVE PROTEIN: CPT | Performed by: PHYSICIAN ASSISTANT

## 2022-11-03 PROCEDURE — 36415 COLL VENOUS BLD VENIPUNCTURE: CPT | Performed by: PHYSICIAN ASSISTANT

## 2022-11-03 PROCEDURE — 120N000011 HC R&B TRANSPLANT UMMC

## 2022-11-03 PROCEDURE — 99233 SBSQ HOSP IP/OBS HIGH 50: CPT | Performed by: STUDENT IN AN ORGANIZED HEALTH CARE EDUCATION/TRAINING PROGRAM

## 2022-11-03 PROCEDURE — 250N000013 HC RX MED GY IP 250 OP 250 PS 637: Performed by: PHYSICIAN ASSISTANT

## 2022-11-03 PROCEDURE — 80048 BASIC METABOLIC PNL TOTAL CA: CPT | Performed by: PHYSICIAN ASSISTANT

## 2022-11-03 PROCEDURE — 85027 COMPLETE CBC AUTOMATED: CPT | Performed by: PHYSICIAN ASSISTANT

## 2022-11-03 PROCEDURE — 84100 ASSAY OF PHOSPHORUS: CPT | Performed by: STUDENT IN AN ORGANIZED HEALTH CARE EDUCATION/TRAINING PROGRAM

## 2022-11-03 PROCEDURE — 99207 PR APP CREDIT; MD BILLING SHARED VISIT: CPT | Performed by: PHYSICIAN ASSISTANT

## 2022-11-03 PROCEDURE — 84681 ASSAY OF C-PEPTIDE: CPT | Performed by: PHYSICIAN ASSISTANT

## 2022-11-03 RX ADMIN — METRONIDAZOLE 500 MG: 500 TABLET ORAL at 22:36

## 2022-11-03 RX ADMIN — INSULIN ASPART 2 UNITS: 100 INJECTION, SOLUTION INTRAVENOUS; SUBCUTANEOUS at 13:28

## 2022-11-03 RX ADMIN — CIPROFLOXACIN HYDROCHLORIDE 500 MG: 500 TABLET, FILM COATED ORAL at 08:54

## 2022-11-03 RX ADMIN — LEVOTHYROXINE SODIUM 100 MCG: 100 TABLET ORAL at 08:54

## 2022-11-03 RX ADMIN — PANCRELIPASE 3 CAPSULE: 24000; 76000; 120000 CAPSULE, DELAYED RELEASE PELLETS ORAL at 17:07

## 2022-11-03 RX ADMIN — METRONIDAZOLE 500 MG: 500 TABLET ORAL at 08:54

## 2022-11-03 RX ADMIN — PANCRELIPASE 3 CAPSULE: 24000; 76000; 120000 CAPSULE, DELAYED RELEASE PELLETS ORAL at 12:03

## 2022-11-03 RX ADMIN — METRONIDAZOLE 500 MG: 500 TABLET ORAL at 13:35

## 2022-11-03 RX ADMIN — CIPROFLOXACIN HYDROCHLORIDE 500 MG: 500 TABLET, FILM COATED ORAL at 22:36

## 2022-11-03 RX ADMIN — PANCRELIPASE 3 CAPSULE: 24000; 76000; 120000 CAPSULE, DELAYED RELEASE PELLETS ORAL at 08:54

## 2022-11-03 ASSESSMENT — ACTIVITIES OF DAILY LIVING (ADL)
ADLS_ACUITY_SCORE: 22
DEPENDENT_IADLS:: INDEPENDENT
ADLS_ACUITY_SCORE: 22

## 2022-11-03 NOTE — PROGRESS NOTES
CLINICAL NUTRITION SERVICES    Reason for Assessment:  Nutrition education regarding carb counting & high protein diet per request from primary team    Diet History:  Pt reports no history of receiving education on carb counting in the past; however, pt's wife has followed ketogenic diet and is familiar with carb-containing food and how to read food labels for total carb content.     Nutrition Diagnosis:  Food- and nutrition-related knowledge deficit r/t no previous knowledge of carb counting AEB pt report of not having received carb counting nutrition education in the past.      Interventions:  Nutrition Education:Survival Information  1. Provided verbal and written nutrition on diabetes meal planning and importance of consistent carbohydrate intake to optimize glycemic control. Discussed option of set carb goal per meal with set insulin dosing vs carb-based insulin dosing based on flexible carb intake; however, pt and pt's wife would like to aim for consistent insulin dosing and therefore aim for consistent CHO intake of 60 g CHO per meal TID at this time. They acknowledge the potential to transition to flexible carb to insulin ratio dosing down the road.   2. Provided verbal and written nutrition education on carb counting and consistent CHO intake of 60 g per meal. Gave various examples on how to reach 60 g CHO.  Pt and pt's wife think this will be doable.   3. Provided high-protein diet education to support healing and LBM (protein goal 100 g daily). Recommend he continue high protein diet until symptoms resolve. Pt loves meat and eats high protein foods with meals.   3. Handouts provided: Carb Counting handout & Sources of Protein    Goals:   Patient will verbalize at least one food choice (along with the appropriate portion size) in each of the food groups that contain 1 carb unit.      Pt will verbalize at least 5 high protein food choices.    Follow-up:    Patient to ask any further nutrition-related  questions before discharge. In addition, pt may request outpatient RD appointment.    Lilo Piedra RD, LD  Pager: [phone]

## 2022-11-03 NOTE — PLAN OF CARE
/82 (BP Location: Left arm)   Pulse 93   Temp 98  F (36.7  C) (Oral)   Resp 16   Ht 1.829 m (6')   Wt 95.8 kg (211 lb 4.8 oz)   SpO2 94%   BMI 28.66 kg/m      Shift: 1676-6487  VS: stable on RA, afebrile  Cardiac: WDL  Neuro: AOx4  BG: ACHS (271, 246)  Respiratory: Non labored breathing, clear lung sounds   Pain/Nausea/PRN: denies nausea and pain   Diet: moderate consistent carb diet   LDA: L skater drain maroon/brown output, L PIV SL   GI/: voiding not saving, no bm this shift   Skin: no skin issues noted   Mobility: UAL   Plan: Continue plan of care     Will give report to oncoming nurse. Pt left in stable condition, care relinquished at this time.

## 2022-11-03 NOTE — PLAN OF CARE
Temp: 98  F (36.7  C) Temp src: Oral BP: 116/88 Pulse: 97   Resp: 16 SpO2: 98 % O2 Device: None (Room air)      Neuro: A&Ox4.   Cardiac: AVSS.   Respiratory: On RA. Denies SOB  GI/: Voiding adequate amount. Soft BM X1  Diet/appetite: Tolerating CHO diet. Eating well.  Activity: Up ad jake  Pain: Denies   Skin: No new deficits noted.  LDA's: L PIV SL    Plan: Continue with POC. Notify primary team with changes.
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
## 2022-11-03 NOTE — PROGRESS NOTES
Care Management Initial Consult    General Information  Assessment completed with: Patient, VM-chart review,    Type of CM/SW Visit: Initial Assessment    Primary Care Provider verified and updated as needed: Yes (Dr. Dawson, [facility])   Readmission within the last 30 days:        Reason for Consult: discharge planning  Advance Care Planning:            Communication Assessment  Patient's communication style: spoken language (English or Bilingual)    Hearing Difficulty or Deaf: yes   Wear Glasses or Blind: yes    Cognitive  Cognitive/Neuro/Behavioral: WDL  Level of Consciousness: alert  Arousal Level: opens eyes spontaneously, arouses to touch/gentle shaking, arouses to voice  Orientation: oriented x 4  Mood/Behavior: calm  Best Language: 0 - No aphasia  Speech: clear, logical    Living Environment:   People in home: spouse     Current living Arrangements: house      Able to return to prior arrangements: yes       Family/Social Support:  Care provided by: self  Provides care for: no one  Marital Status:   Wife, Children          Description of Support System: Supportive, Involved    Support Assessment: Adequate family and caregiver support    Current Resources:   Patient receiving home care services: No     Community Resources: Home Care  Equipment currently used at home: none  Supplies currently used at home: None    Employment/Financial:  Employment Status: retired        Financial Concerns: No concerns identified           Lifestyle & Psychosocial Needs:  Social Determinants of Health     Tobacco Use: Not on file   Alcohol Use: Not on file   Financial Resource Strain: Not on file   Food Insecurity: Not on file   Transportation Needs: Not on file   Physical Activity: Not on file   Stress: Not on file   Social Connections: Not on file   Intimate Partner Violence: Not on file   Depression: Not on file   Housing Stability: Not on file       Functional Status:  Prior to admission patient  needed assistance:   Dependent ADLs:: Independent  Dependent IADLs:: Independent       Mental Health Status:  Mental Health Status: No Current Concerns       Chemical Dependency Status:  Chemical Dependency Status: No Current Concerns             Values/Beliefs:  Spiritual, Cultural Beliefs, Faith Practices, Values that affect care:                 Additional Information:    Pt admitted with acute necrotizing pancreatis, s/p skater drain placement.  Pt will discharge on insulin, diabetic education ordered.   CMA as pt/spouse requesting home care RN support for drain and new dx of diabetes.      Met with pt and spouse.  Introduced RNCC role.  Discussed/reviewed home care RN services.  Provided pt with a list of home care agencies near pt home.  Pt and spouse have no preference on agency.  PCP is through [facility] so agreed to start with [facility].  Pt spouse acknowledged that they are waiting to meet with diabetic educator aware this may happen today vs tomorrow.  Pt and spouse note no concerns with managing skater drain.      Spoke with [facility] [phone], Fax [phone].  Agency is able to accept for home RN services.  Faxed initial clinical for review.  Agreed to f/u in a.m.  Discharge home care orders initiated. .     Shannon Souza RN BSN, PHN, ACM-RN  7A RN Care Coordinator  Phone: [phone]  Pager [phone]    To contact the weekend RNCC  [facility] (0800 - 1630) Saturday and Sunday    Units: 4A, 4C, 4E, 5A and 5B- Pager 1: [phone]    Units: 6A, 6B, 6C, 6D- Pager 2: [phone]    Units: 7A, 7B, 7C, 7D, and 5C-Pager 3: [phone]    [facility] ([phone]) Saturday and Sunday    Units: 5 Ortho, 8A, 10 ICU, & Pediatric Units-Pager 4: [phone]    11/3/2022 2:08 PM

## 2022-11-04 DIAGNOSIS — K85.91 NECROTIZING PANCREATITIS: Primary | ICD-10-CM

## 2022-11-04 LAB
ANION GAP SERPL CALCULATED.3IONS-SCNC: 13 MMOL/L (ref 7–15)
ANION GAP SERPL CALCULATED.3IONS-SCNC: 19 MMOL/L (ref 7–15)
BUN SERPL-MCNC: 19.7 MG/DL (ref 8–23)
BUN SERPL-MCNC: 20.2 MG/DL (ref 8–23)
CALCIUM SERPL-MCNC: 7.9 MG/DL (ref 8.8–10.2)
CALCIUM SERPL-MCNC: 8.2 MG/DL (ref 8.8–10.2)
CHLORIDE SERPL-SCNC: 98 MMOL/L (ref 98–107)
CHLORIDE SERPL-SCNC: 99 MMOL/L (ref 98–107)
CREAT SERPL-MCNC: 0.87 MG/DL (ref 0.67–1.17)
CREAT SERPL-MCNC: 0.93 MG/DL (ref 0.67–1.17)
CRP SERPL-MCNC: 119 MG/L
DEPRECATED HCO3 PLAS-SCNC: 15 MMOL/L (ref 22–29)
DEPRECATED HCO3 PLAS-SCNC: 21 MMOL/L (ref 22–29)
ERYTHROCYTE [DISTWIDTH] IN BLOOD BY AUTOMATED COUNT: 14.1 % (ref 10–15)
GFR SERPL CREATININE-BSD FRML MDRD: >90 ML/MIN/1.73M2
GFR SERPL CREATININE-BSD FRML MDRD: >90 ML/MIN/1.73M2
GLUCOSE BLDC GLUCOMTR-MCNC: 249 MG/DL (ref 70–99)
GLUCOSE BLDC GLUCOMTR-MCNC: 253 MG/DL (ref 70–99)
GLUCOSE BLDC GLUCOMTR-MCNC: 256 MG/DL (ref 70–99)
GLUCOSE BLDC GLUCOMTR-MCNC: 258 MG/DL (ref 70–99)
GLUCOSE BLDC GLUCOMTR-MCNC: 295 MG/DL (ref 70–99)
GLUCOSE BLDC GLUCOMTR-MCNC: 308 MG/DL (ref 70–99)
GLUCOSE BLDC GLUCOMTR-MCNC: 312 MG/DL (ref 70–99)
GLUCOSE BLDC GLUCOMTR-MCNC: 313 MG/DL (ref 70–99)
GLUCOSE BLDC GLUCOMTR-MCNC: 334 MG/DL (ref 70–99)
GLUCOSE SERPL-MCNC: 259 MG/DL (ref 70–99)
GLUCOSE SERPL-MCNC: 270 MG/DL (ref 70–99)
HCT VFR BLD AUTO: 40.4 % (ref 40–53)
HGB BLD-MCNC: 13.2 G/DL (ref 13.3–17.7)
MCH RBC QN AUTO: 29.5 PG (ref 26.5–33)
MCHC RBC AUTO-ENTMCNC: 32.7 G/DL (ref 31.5–36.5)
MCV RBC AUTO: 90 FL (ref 78–100)
PHOSPHATE SERPL-MCNC: 3.3 MG/DL (ref 2.5–4.5)
PLATELET # BLD AUTO: 346 10E3/UL (ref 150–450)
POTASSIUM SERPL-SCNC: 4.4 MMOL/L (ref 3.4–5.3)
POTASSIUM SERPL-SCNC: 4.5 MMOL/L (ref 3.4–5.3)
RBC # BLD AUTO: 4.47 10E6/UL (ref 4.4–5.9)
SODIUM SERPL-SCNC: 132 MMOL/L (ref 136–145)
SODIUM SERPL-SCNC: 133 MMOL/L (ref 136–145)
WBC # BLD AUTO: 17.6 10E3/UL (ref 4–11)

## 2022-11-04 PROCEDURE — 85027 COMPLETE CBC AUTOMATED: CPT | Performed by: STUDENT IN AN ORGANIZED HEALTH CARE EDUCATION/TRAINING PROGRAM

## 2022-11-04 PROCEDURE — 99254 IP/OBS CNSLTJ NEW/EST MOD 60: CPT | Performed by: PHYSICIAN ASSISTANT

## 2022-11-04 PROCEDURE — 36415 COLL VENOUS BLD VENIPUNCTURE: CPT | Performed by: STUDENT IN AN ORGANIZED HEALTH CARE EDUCATION/TRAINING PROGRAM

## 2022-11-04 PROCEDURE — 250N000013 HC RX MED GY IP 250 OP 250 PS 637: Performed by: PHYSICIAN ASSISTANT

## 2022-11-04 PROCEDURE — 99233 SBSQ HOSP IP/OBS HIGH 50: CPT | Performed by: STUDENT IN AN ORGANIZED HEALTH CARE EDUCATION/TRAINING PROGRAM

## 2022-11-04 PROCEDURE — 120N000011 HC R&B TRANSPLANT UMMC

## 2022-11-04 PROCEDURE — 99207 PR APP CREDIT; MD BILLING SHARED VISIT: CPT | Performed by: PHYSICIAN ASSISTANT

## 2022-11-04 PROCEDURE — 82310 ASSAY OF CALCIUM: CPT | Performed by: STUDENT IN AN ORGANIZED HEALTH CARE EDUCATION/TRAINING PROGRAM

## 2022-11-04 PROCEDURE — 84100 ASSAY OF PHOSPHORUS: CPT | Performed by: STUDENT IN AN ORGANIZED HEALTH CARE EDUCATION/TRAINING PROGRAM

## 2022-11-04 RX ADMIN — CIPROFLOXACIN HYDROCHLORIDE 500 MG: 500 TABLET, FILM COATED ORAL at 20:33

## 2022-11-04 RX ADMIN — SENNOSIDES AND DOCUSATE SODIUM 1 TABLET: 8.6; 5 TABLET ORAL at 08:04

## 2022-11-04 RX ADMIN — METRONIDAZOLE 500 MG: 500 TABLET ORAL at 20:33

## 2022-11-04 RX ADMIN — PANCRELIPASE 3 CAPSULE: 24000; 76000; 120000 CAPSULE, DELAYED RELEASE PELLETS ORAL at 08:05

## 2022-11-04 RX ADMIN — PANCRELIPASE 3 CAPSULE: 24000; 76000; 120000 CAPSULE, DELAYED RELEASE PELLETS ORAL at 13:06

## 2022-11-04 RX ADMIN — CIPROFLOXACIN HYDROCHLORIDE 500 MG: 500 TABLET, FILM COATED ORAL at 08:04

## 2022-11-04 RX ADMIN — PANCRELIPASE 4 CAPSULE: 24000; 76000; 120000 CAPSULE, DELAYED RELEASE PELLETS ORAL at 17:30

## 2022-11-04 RX ADMIN — METRONIDAZOLE 500 MG: 500 TABLET ORAL at 14:45

## 2022-11-04 RX ADMIN — METRONIDAZOLE 500 MG: 500 TABLET ORAL at 08:05

## 2022-11-04 RX ADMIN — INSULIN ASPART 8 UNITS: 100 INJECTION, SOLUTION INTRAVENOUS; SUBCUTANEOUS at 13:12

## 2022-11-04 RX ADMIN — LEVOTHYROXINE SODIUM 100 MCG: 100 TABLET ORAL at 08:04

## 2022-11-04 ASSESSMENT — ACTIVITIES OF DAILY LIVING (ADL)
ADLS_ACUITY_SCORE: 22

## 2022-11-04 NOTE — PLAN OF CARE
/72 (BP Location: Left arm)   Pulse 87   Temp 98.5  F (36.9  C) (Oral)   Resp 16   Ht 1.829 m (6')   Wt 95.8 kg (211 lb 4.8 oz)   SpO2 96%   BMI 28.66 kg/m          8062-3895  AVSS on RA. BS ACHS with carb coverage. Diabetic education was completed this afternoon- wife, daughter, and patient were present during training and all three agreed it went very well! Family is very involved with care and a good support system for patient. Denies pain and nausea. Up ad jake. Right abdominal skater drain; flush Q8 hrs per order. Low output; remains brown/tan. Flushes well. Patient is anticipating being discharged with skater drain. Drain teaching has been started. Adequate urine output- not saving. BM today. Patient in good spirits. Anticipated to discharge Sunday (11/6)- team wants to have extra day in hopes we can better manage blood sugars. Lantus and novolog have been changed and altered by endocrine this morning- hoping this will help manage his blood sugars better. PIV saline locked. Continue plan of care, please notify MD with any changes.

## 2022-11-04 NOTE — DISCHARGE INSTRUCTIONS
Nursing please fax final discharge orders to [facility] [phone], Fax [phone]      Signs/symptoms of malabsorption (suggesting possible need for increasing Creon):  -greasy/oily/floating BM  -excessive gas  -bloating with meals  -weight loss over time  -low vitamin levels (A, D, E, B12)    Recommend checking vitamin A, D, E, and B12 levels every 1-2 years for monitoring due to risk for deficiency.            Diabetes  Plan for HOME--  Monitor blood glucose three times a day before each meal and at bedtime.  Before each meal, take Novolog Correction based on blood glucose  Do Not give Correction Insulin if Pre-Meal BG less than 140.    For Pre-Meal  - 164 give 1 unit.    For Pre-Meal  - 189 give 2 units.    For Pre-Meal  - 214 give 3 units.    For Pre-Meal  - 239 give 4 units.    For Pre-Meal  - 264 give 5 units.    For Pre-Meal  - 289 give 6 units.    For Pre-Meal  - 314 give 7 units.    For Pre-Meal  - 339 give 8 units.    For Pre-Meal  - 364 give 9 units.    For Pre-Meal BG greater than or equal to 365 give 10 units      Plus   Novolog 1 unit for every 5 grams of Carbohydrate eaten     At 4:00 PM:  Long-Acting Insulin Basaglar (glargine) 50 units each evening at 4:00 PM     At 10:00 PM:  Novolog Bedtime Correction based on blood glucose at 10:00 PM  Do Not give Bedtime Correction Insulin if BG less than 200.    For  - 224 give 1 units.    For  - 249 give 2 units.    For  - 274 give 3 units.    For  - 299 give 4 units.    For  - 324 give 5 units.    For  - 349 give 6 units.    For BG greater than or equal to 350 give 7 units.        Monitor impact of your activity and eating on BG trends.  Notify Dr. Dawson if your glucose is persistently greater than 180 or less than 110.    If you notice unexplained high BG (ex. Cannot explain it by forgetting to cover the carbs in a cookie you ate), pay attention.  High  BG can be a sign of infection.      Stay connected with your clinic diabetes resources-- educator RN and educator dietician.  They'll teach you the glucose sensor and aid in adjusting your insulin plan as you move forward.    Goal A1C is customized to the individual.  Your goal may be 6.5-7% if achieved without low glucose.

## 2022-11-04 NOTE — PROGRESS NOTES
Gastroenterology Follow up Note         Assessment and Plan:     Vernon Ortiz is a 62 year old male with a history of hyperlipidemia and hypothyroidism was admitted on 10/26 from OSH for necrotizing pancreatitis for infected pancreatic necrosis.      #Acute necrotizing pancreatitis  - Etiology: Trauma to the epigastrium three days prior to AP, no gallstones, normal TG. No gallstone on admission US. However, Elevated LFTs in OSH (ALT 62, TB 2). Normal LFTs now.   - Index CT abdomen: 10/25/22   - Interventions:  IR drainage 10/26/22: 12 F pigtail drain was placed.   IR sinogram 11/1/22: Largely decompressed irregular collection in the area of the pancreatic body. No enteric fistula is identified. Existing pancreatic drain was upsized to a 14 Arabic locking pigtail catheter. Getting 10 ml TID flu  - Diet: Constant carbohydrate diet   - Exocrine insuffiencey: Diarrhea, fecal elastase 5.2, On creon: 96,000 international unit(s) three times with meals.   - Endocrine insufficiency: Hyperglycemia, A1C on admission 5.6% on insulin.   - Abx: Currently on metronidazole 500 mg TID, ciprofloxacin 500 mg BID    #Infected pancreatic necrotic collection s/p IR drain placement 10/26/22  - CT imaging on admission showing large, complex necrotic collection around 4 cm along the superior aspect of the pancreatic body/tail junction containing gas and fluid concerning for infection.   - Plan was for IR drainage as collection too immature to be drained by GI, and no retroperitoneal window to be candidate for VARD by Surgery. IR drain was placed successfully 10/26/22. Collection would need to further mature prior to any planned surgical debridement.   - The patient had worsening leukocytosis and CT abdomen 10/30/22 that showed increased peripancreatic gas, fluid, and fat stranding. He had IR sinogram that did not show much increased collection and drain   - Positive fluid cultures for E.Coli   - Now improving WBC: 17.6 from 21. VSS    #  Non severe protein calorie malnutrition  - Nutrition following   - Encouraged oral intake     # Hyperglycemia   - Endocrinology is following          Recommendations:   - Outpatient follow up with CT abdomen pelvis with IV contrast and outpatient visit by Dr. Santos in 4 weeks.   - No inpatient endoscopic interventions from GI standpoint at this time.   - Follow infection clinically, continue oral antibiotics.    - Pain management per primary team.  - Nutirion support   - Continue creon: 72,000 units with meals and 36,000 with snacks.       It has been a pleasure to participate in the care of this patient.  Patient discussed with GI staff, Dr. Jones. Please do not hesitate to contact the GI service with any questions or concerns.     Anand Apodaca MD  GI Fellow          Subjective/Objective:   - No acute events overnight  - No fevers, vital signs are better. Able to tolerate diet with no vomiting or pain. He has no abdominal pain. Feels pretty good.     I/O             Medications:     Current Facility-Administered Medications   Medication     acetaminophen (TYLENOL) tablet 650 mg     amylase-lipase-protease (CREON 24) 15906-82202 units per EC capsule 3 capsule     ciprofloxacin (CIPRO) tablet 500 mg     glucose gel 15-30 g    Or     dextrose 50 % injection 25-50 mL    Or     glucagon injection 1 mg     hydrALAZINE (APRESOLINE) injection 10 mg     HYDROmorphone (DILAUDID) tablet 2 mg     insulin aspart (NovoLOG) injection (RAPID ACTING)     insulin aspart (NovoLOG) injection (RAPID ACTING)     insulin aspart (NovoLOG) injection (RAPID ACTING)     insulin aspart (NovoLOG) injection (RAPID ACTING)     insulin aspart (NovoLOG) injection (RAPID ACTING)     insulin aspart (NovoLOG) injection (RAPID ACTING)     insulin glargine (LANTUS PEN) injection 30 Units     ipratropium - albuterol 0.5 mg/2.5 mg/3 mL (DUONEB) neb solution 3 mL     levothyroxine (SYNTHROID/LEVOTHROID) tablet 100 mcg     lidocaine (LMX4) cream      lidocaine 1 % 0.1-1 mL     melatonin tablet 1 mg     metroNIDAZOLE (FLAGYL) tablet 500 mg     naloxone (NARCAN) injection 0.2 mg    Or     naloxone (NARCAN) injection 0.4 mg    Or     naloxone (NARCAN) injection 0.2 mg    Or     naloxone (NARCAN) injection 0.4 mg     polyethylene glycol (MIRALAX) Packet 17 g     prochlorperazine (COMPAZINE) injection 10 mg    Or     prochlorperazine (COMPAZINE) tablet 10 mg    Or     prochlorperazine (COMPAZINE) suppository 25 mg     senna-docusate (SENOKOT-S/PERICOLACE) 8.6-50 MG per tablet 1 tablet     senna-docusate (SENOKOT-S/PERICOLACE) 8.6-50 MG per tablet 1 tablet    Or     senna-docusate (SENOKOT-S/PERICOLACE) 8.6-50 MG per tablet 2 tablet     sodium chloride (PF) 0.9% PF flush 10 mL     sodium chloride (PF) 0.9% PF flush 10 mL     sodium chloride (PF) 0.9% PF flush 3 mL     sodium chloride (PF) 0.9% PF flush 3 mL            Physical Exam:   VS:  /85 (BP Location: Left arm)   Pulse 89   Temp 97.5  F (36.4  C) (Oral)   Resp 16   Ht 1.829 m (6')   Wt 95.8 kg (211 lb 4.8 oz)   SpO2 95%   BMI 28.66 kg/m      Wt:   Wt Readings from Last 2 Encounters:   11/03/22 95.8 kg (211 lb 4.8 oz)      Constitutional: cooperative, pleasant, no acute distress  Eyes: Conjunctiva anicteric  HEENT: Normal oropharynx without ulcers or exudate, mucus membranes moist  CV: RRR, no m/r/g  Respiratory: CTAB  Abd:  Nondistended, +bs, no hepatosplenomegaly, nontender, no rebound or guarding   Skin: warm, perfused, no jaundice  Neuro: AAO x 3, No asterixis         Laboratory:   BMP  Recent Labs   Lab 11/04/22  1300 11/04/22  1117 11/04/22  0811 11/04/22  0741 11/04/22  0548 11/04/22  0540 11/03/22  0848 11/03/22  0701 11/02/22  0523 11/02/22  0513 11/01/22  0725 11/01/22  0519   NA  --   --   --   --   --  133*  --  132*  --  132*  --  134*   POTASSIUM  --   --   --   --   --  4.4  --  4.5  --  4.4  --  4.3   CHLORIDE  --   --   --   --   --  99  --  98  --  99  --  100   DOLORES  --   --   --    --   --  8.2*  --  7.9*  --  7.7*  --  7.9*   CO2  --   --   --   --   --  21*  --  15*  --  19*  --  19*   BUN  --   --   --   --   --  19.7  --  20.2  --  17.5  --  14.9   CR  --   --   --   --   --  0.87  --  0.93  --  0.90  --  0.92   * 295* 258* 249*   < > 259*   < > 270*   < > 285*   < > 295*    < > = values in this interval not displayed.     CBC  Recent Labs   Lab 11/04/22  0540 11/03/22  0701 11/02/22  0513 11/01/22  0519   WBC 17.6* 21.7* 26.8* 26.3*   RBC 4.47 4.45 4.68 4.78   HGB 13.2* 13.3 13.8 14.1   HCT 40.4 39.8* 41.5 42.5   MCV 90 89 89 89   MCH 29.5 29.9 29.5 29.5   MCHC 32.7 33.4 33.3 33.2   RDW 14.1 14.1 14.4 14.3    293 297 241     INRNo lab results found in last 7 days.  LFTs  Recent Labs   Lab 10/29/22  0518   ALKPHOS 64   AST 25   ALT 22   BILITOTAL 1.0   PROTTOTAL 5.7*   ALBUMIN 2.8*      PANCNo lab results found in last 7 days.         Imaging/Procedures/Studies:   No results found for this or any previous visit.    IR Sinogram 11/1/22   TECHNIQUE/FINDINGS: Patient was positioned in the supine on the  fluoroscopy table. Injection through the existing 12 Turkmen  retroperitoneal drain with an anterior approach demonstrated  continuity of the pigtail with a very small collection. Multiple  projections obtained of the collection with multiple strandy areas of  opacification compatible with the irregularity noted within the gas  containing collection on CT. No evidence of enteric fistula. Actual  volume of the collection was relatively small.     Drain and surrounding abdomen were prepped and draped in usual sterile  manner. Lidocaine 1% without epinephrine was used for local  anesthesia. The existing drain was cut and wire access was made inside  the collection with a 035 floppy-tipped Green wire. Over the wire,  the existing drain was removed under fluoroscopy. A 4 Turkmen Kumpe  catheter was used to direct the wire into an optimal position. A 14  Turkmen locking pigtail catheter  was advanced over the wire under  fluoroscopy. Pigtail was formed. Location confirmed with contrast  injection. Drain was aspirated, with return of thin contrast and dark  blood products. New drain was secured with a 2-0 nylon suture and  sterile dressing. Attached to bag drainage..                                                                      IMPRESSION:  Sinogram showed largely decompressed irregular collection in the area  of the pancreatic body. No enteric fistula is identified. Existing  pancreatic drain was upsized to a 14 Chinese locking pigtail catheter  and is now fully optimized. Minimal fluid could be aspirated,  consistent with a predominantly gas containing collection.     PLAN:  Continue routine drain aftercare    CT abdomen/pelvis 10/30/2022    IMPRESSION:  1. Sequela of necrotizing pancreatitis with new peripancreatic drain  in place. There is slightly increased peripancreatic gas, fluid, and  fat stranding without discrete focal enhancing collection to suggest  abscess. No evidence of viscus perforation or vascular injury adjacent  to the pancreas.  2. Unchanged reactive fluid along the pericolic gutters and small  volume free pelvic fluid. Stable reactive changes of the colon in the  upper pericolic gutters.  3. Colonic diverticulosis without evidence of diverticulitis.

## 2022-11-04 NOTE — PROGRESS NOTES
CLINICAL NUTRITION SERVICES - BRIEF NOTE  *Request from primary team for adjustment in enzyme dosing*     Nutrition Prescription    RECOMMENDATIONS FOR MDs/PROVIDERS TO ORDER:  Increase Creon 24 to 4 with meals, 2 with snacks d/t patient's size (this will provide 1000 units/lipase/kg/meal - more mid-range of recommended dosing).         EVALUATION OF THE PROGRESS TOWARD GOALS      NEW FINDINGS   Pt denies symptoms of malabsorptions (having 3 BM daily - becoming more formed, not oily/floating, denies excessive gas/bloating associated with meals).        INTERVENTIONS  Reviewed signs/symptoms of malabsorption with patient/family.  Entered a snapshot of these in discharge instructions as well. Discussed pancreatic enzymes and their function.  Reviewed Creon sizes and options.  Patient agreeable to adjustment in enzyme dosing.  Paged PA with recommendation above.     Monitoring/Evaluation  Progress toward goals will be monitored and evaluated per protocol.     Erna Patino, MS, RD, LD, CCTD, CNSC  7A/Obs unit pager [phone]  Weekend pager [phone]

## 2022-11-04 NOTE — PROGRESS NOTES
Care Management Follow Up    Length of Stay (days): 9    Expected Discharge Date: 11/05/2022     Concerns to be Addressed: discharge planning     Patient plan of care discussed at interdisciplinary rounds: Yes    Anticipated Discharge Disposition: Home, Home Care     Anticipated Discharge Services: None  Anticipated Discharge DME: None    Patient/family educated on Medicare website which has current facility and service quality ratings: yes  Education Provided on the Discharge Plan:    Patient/Family in Agreement with the Plan: yes    Referrals Placed by CM/SW: External Care Coordination, Homecare    Additional Information:  Pt status reviewed during care team rounds.  DM education today.  Anticipate discharge on Saturday 11/5 with home health RN services.      Updated April [facility] [phone], Fax [phone].    Shannon Souza, RN BSN, PHN, ACM-RN  7A RN Care Coordinator  Phone: [phone]  Pager [phone]    To contact the weekend RNCC  [place] (0800 [phone]) Saturday and Sunday    Units: 4A, 4C, 4E, 5A and 5B- Pager 1: [phone]    Units: 6A, 6B, 6C, 6D- Pager 2: [phone]    Units: 7A, 7B, 7C, 7D, and 5C-Pager 3: [phone]    [facility] ([phone]) Saturday and Sunday    Units: 5 Ortho, 8A, 10 ICU, & Pediatric Units-Pager 4: [phone]    11/4/2022 1:48 PM

## 2022-11-04 NOTE — PROGRESS NOTES
IR plan  CT abdomen pelvis with contrast in 1 month prior to follow up visit with Tom  Then IR sinogram to look for fistula    Hood Gillette PA-C  Interventional Radiology  [phone]

## 2022-11-04 NOTE — CONSULTS
Diabetes Educator consult reeived for Vernon Ortiz, age 62, with a PMH of hypothyroidism and HLD was transferred from OSH and admitted with severe sepsis 2/2 necrotizing pancreatitis. Patient had IR drainage and pigtail drain placement on 10/26/2022 followed with IR sinogram on 11/1/2022. Now has hyperglycemia requiring insulin. HgA1c 5.6%. Blood glucoses remain elevated in 200's to low 300's mg/dL on a Mod CHO diet with good-fair appetite. Endocrinology consulted this morning.     Wife (Natasha) and daughter at bedside. Natasha will be assisting Vernon with cares at home. Diabetes education included::   1.  Reviewed indication for insulin needs due to necrotizing pancreatitis vs stress.   2.  Reviewed blood glucose monitoring and insulin regimen. Gave instructions listed below.  Discussed daily routine of monitoring glucose, use of basal/bolus insulin and concepts of insulin to CHO ratio and correction scale.   Provided Understanding Your Diabetes Basics booklet that includes resources for assisting with carb counting.  3.  Discussed the frequency of testing needed. Reviewed Accu Chek Guide glucose meter setup, battery, memory, test strip insertion, and blood collection. Reviewed lancing device how to insert lancet, adjust depth, load spring, safe needle removal, and disposal in sharps container. Vernon successfully demonstrated blood glucose check on himself.Accu Chek Guide Me Meter/starter kit provided to patient.  4.  Reviewed Novolog flexpen and Basaglar Kwikpen. Reviewed insulin action, onset, peak, and duration. Reviewed insulin pen storage, applying home pen needle, priming, insertion technique, needle removal, and disposal in sharps container. Vernon and Natasha successfully demonstrated with training pen into an injection pad. Both able to determine dosages accurately with case scenario.  Provided B-D Site Selection and Rotation, Use of Pen Needle, and Injections with Pen Needles document.  5.  Reviewed signs and  symptoms of hypoglycemia. Discussed rule of 15--treating BG <70 mg/dL or <51 mg/dL with 15 or 30 grams of CHO, recheck in 15 minutes, and repeat until BG is above  mg/dL.     Currently, the insulin regimen he is on and what I taught is:  11/4/22  Vernon Ortiz  Monitor blood glucose three times a day before each meal and at bedtime.  Before each meal, take Novolog Correction based on blood glucose  Do Not give Correction Insulin if Pre-Meal BG less than 140.    For Pre-Meal  - 164 give 1 unit.    For Pre-Meal  - 189 give 2 units.    For Pre-Meal  - 214 give 3 units.    For Pre-Meal  - 239 give 4 units.    For Pre-Meal  - 264 give 5 units.    For Pre-Meal  - 289 give 6 units.    For Pre-Meal  - 314 give 7 units.    For Pre-Meal  - 339 give 8 units.    For Pre-Meal  - 364 give 9 units.    For Pre-Meal BG greater than or equal to 365 give 10 units     Plus   Novolog 1 unit for every 8 grams of Carbohydrate eaten    At 4:00 PM:  Long-Acting Insulin Basaglar (glargine) 30 units each evening at 4:00 PM    At 10:00 PM:  Novolog Bedtime Correction based on blood glucose at 10:00 PM  Do Not give Bedtime Correction Insulin if BG less than 200.    For  - 224 give 1 units.    For  - 249 give 2 units.    For  - 274 give 3 units.    For  - 299 give 4 units.    For  - 324 give 5 units.    For  - 349 give 6 units.    For BG greater than or equal to 350 give 7 units.          For discharge will need:   Accu Chek Guide glucose meter-Will provide meter/starter kit (left in room with patient)  Accu Chek Guide test strips   Accu Chek Soft Clix Lancets   Sharps Container   Alcohol wipes   B-D 4 mm shelley pen needles   Novolog flexpens   Basaglar (glargine) osbaldo Junior and Natasha are interested in Freestyle Adrien 2. Per pharmacy liason, not covered by insurance. Will ask for a prior auth. Plan to discharge tomorrow. Will likely need to  receive education on Freestyle Adrien 2 outpatient if approval received..     Diabetes education completed. All questions and concerns addressed. Discussed with charge RN and care coordinator (Shannon Souza).    CHRISTIAN Ravi  Diabetes Clinical Nurse Specialist/[facility]  [phone]

## 2022-11-04 NOTE — CONSULTS
NEW INPATIENT DIABETES MANAGEMENT CONSULT  Vernon Ortiz  Age: 62 year old  MRN # [id number]   YOB: 1960    Chief Complaint: Vernon Ortiz is a 62 year old male admitted on 10/26/2022. He has a history of HLD and hypothyroidism and is admitted for necrotizing pancreatitis and sepsis.     Reason for Consult: New DM w/ nec panc. Recent normal A1C. High insulin needs without great improvement in BG levels.  Consulting Provider:   Anai Harley      History of Present Illness:   Mr Ortiz was admitted  with necrotizing pancreatitis from OSH.  IR drain placed.  Lipase decreased to 650 from 1290, LFTs are improving. Troponin negative, TG WNL. Denies recent abdominal procedure. He does say he was out in his bass boat and feel forward on part of the boat about 3 days prior to admission. Had similar abdominal pain noted in PCP visit in January without further workup at that time. No known autoimmune disease  But does not know why he is hypothyroid, rare alcohol use ( sometimes a beer with pizza), no recent steroids. Patient not on any commonly offending drugs other then cipro.  He has  never been admitted other then appendectomy.  No history of diabetes or pancreatitis in family.  Looking at care every where 2013 to 2018  A1C=5.3-5.4 , 5.4 and BG .  Body habitus not consistent with type 2 diabetes, no central obesity.  BMI=28.66. No anemia. No diabetic sx including urinary frequency, thirsty, blurry vision.  Admit A1C=5.6 not even prediabetes range of  5.7-6.4.    Other Active Medical Problems: Hypothyroid and hyperlipidemia  Diabetes Mellitus Type:  Hyperglycemia secondary to necrotizing Pancreatitis and infection and stress vs new type 2 diabetes  Duration:  Admitted with  and 418  Diabetic Complications: No complications.  No numbness or tingling. No issues with eyes  Prior to Admission Diabetes Regimen:      BG monitor: New  High blood sugars  Frequency of checks: New High blood  sugars  Diet:  No specific diet.  He has eggs, cereal toast for breakfast, sandwich for lunch, and grills for dinner  Usual BG control PTA:Not applicable  History of DKA: No  Able to Detect Hypoglycemia: Not applicable  Usual Diabetes Care Provider: Not applicable  Primary Care Provider: MANISH ROSENBAUM  Factors Impacting IP Glucose Control:   Current Diet:  Moderate consistent carb diet  Activity:  Golfs 4 rounds/week, walks, fishes    10 point ROS completed with pertinent positives and negatives noted in the HPI  Past medical, family and social histories are reviewed and updated.    Social History    Tobacco:  Smoked for  6 years about 1 pack per day, quit when he was 22  Years old    Alcohol:  Says he is a moderate drinker- last ETOH was a beer with pizza    Marital Status:     Place of Residence:   [place], [place]    Occupation: Retired Law enforcement    Family History  No Diabetes in family or pancreatitis in family  Dad had some heart disease, mom had stomach cancer       Physical Exam   /85 (BP Location: Left arm)   Pulse 89   Temp 97.5  F (36.4  C) (Oral)   Resp 16   Ht 1.829 m (6')   Wt 95.8 kg (211 lb 4.8 oz)   SpO2 95%   BMI 28.66 kg/m    General: pleasant, in no distress.Laying in bed   HEENT: normocephalic, atraumatic. Oral mucous membranes moist.  Lungs: unlabored respiration, no cough  ABD: rounded, nondistended  Skin: warm and dry, no obvious lesions  MSK:  moves all extremities  Lymp:  no LE edema   Mental status:  alert, oriented to self, place, time  Psych:   calm and appropriate interaction     Most Recent Laboratory Tests:  Recent Labs   Lab 11/04/22  0540   HGB 13.2*     No results for input(s): A1C in the last 168 hours.  Recent Labs   Lab 11/04/22  0540   CR 0.87     Recent Labs   Lab 11/04/22  0811 11/04/22  0741 11/04/22  0548 11/04/22  0540 11/04/22  0204 11/04/22  0008   * 249* 256* 259* 308* 313*                     ROUTINE IP LABS (Last four results)  BMPRecent  Labs   Lab 11/04/22  0811 11/04/22  0741 11/04/22  0548 11/04/22  0540 11/02/22  0523 11/02/22  0513 11/01/22  0725 11/01/22  0519 10/31/22  1451 10/31/22  0518   NA  --   --   --  133*  --  132*  --  134*  --  134*   POTASSIUM  --   --   --  4.4  --  4.4  --  4.3  --  4.8   CHLORIDE  --   --   --  99  --  99  --  100  --  101   DOLORES  --   --   --  8.2*  --  7.7*  --  7.9*  --  8.0*   CO2  --   --   --  21*  --  19*  --  19*  --  20*   BUN  --   --   --  19.7  --  17.5  --  14.9  --  12.6   CR  --   --   --  0.87  --  0.90  --  0.92  --  0.91   * 249* 256* 259*   < > 285*   < > 295*   < > 251*    < > = values in this interval not displayed.     CBC  Recent Labs   Lab 11/04/22  0540 11/03/22  0701 11/02/22  0513 11/01/22 0519   WBC 17.6* 21.7* 26.8* 26.3*   RBC 4.47 4.45 4.68 4.78   HGB 13.2* 13.3 13.8 14.1   HCT 40.4 39.8* 41.5 42.5   MCV 90 89 89 89   MCH 29.5 29.9 29.5 29.5   MCHC 32.7 33.4 33.3 33.2   RDW 14.1 14.1 14.4 14.3    293 297 241     INRNo lab results found in last 7 days.  Reviewed current blood glucoses:      Unclear etiology of elevated bg. Needing large amount of insulin.  Reviewed care everywhere, had one gluc=136 but all A1C below prediabetes.   Assessment:   1. Hyperglycemia secondary to necrotizing pancreatitis and or infection and or stress vs new  diabetes  2. Hyperlipidemia  3. Hypothyroid    Plan:    -Increase pm Lantus 30 units every 24 hours and move it sooner to 1600   -Increase Novolog Carb coverage to 1 unit of insulin to 8 g of carbohydrate  -Continue Novolog high sliding scale bg>140  1:25     --BG monitoring TID AC, HS, 0200   -hypoglycemia protocol   -recommend continuing consistent carb diet with carb counting protocol   -diabetes education to be done today by CNS   -on discharge, will recommend outpatient follow up with primary care provider.  Patient declined visit with [facility]    Discussed plan of care with patient and primary team in person  and nursing through this note  Thank you for this consult; Inpatient Diabetes will continue to follow.     To contact Endocrine Diabetes service:   From 8AM-4PM: page inpatient diabetes provider that is following the patient  For questions or updates from 4PM-8AM: page the diabetes job code for on call fellow: [phone]    85 minutes spent on the date of the encounter doing chart review, history, documentation and further activities per the note.   Over 50% of my time on the unit was spent counseling the patient and/or coordinating care regarding acute hyperglycemia management.  See note for details.    April Mcginnis PA-C  Inpatient Diabetes Management Service  Pager [phone]  11/4/2022
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
## 2022-11-04 NOTE — PLAN OF CARE
BP (P) 126/86 (BP Location: Left arm)   Pulse (P) 92   Temp (P) 98.2  F (36.8  C) (Oral)   Resp (P) 16   Ht 1.829 m (6')   Wt 95.8 kg (211 lb 4.8 oz)   SpO2 (P) 95%   BMI 28.66 kg/m      Shift: 0548-8662  Isolation Status: NA  Diet: CHO diet.  LDA: L PIV SL. Abdominal Skater drain with 100 mL brown output.  Infusion(s): NA  VS: AVSS on RA.  Pain/Nausea/PRN: No reported pain or nausea.  B @ 0000  Neuro: A&O x4. Calls appropriately.  Behaviors: Calm, cooperative, and pleasant  Respiratory: Regular depth and pattern; unlabored; expansion symmetrical; breath sounds clear and equal bilaterally; no cough  Cardiac: Regular rhythm, S1, S2; no reported chest pain  GI/: LBM: 11/03. Voiding adequately and without difficulty.  Skin: No noted skin issues aside from the GI drain.  Mobility: UAL  Plan: -Moderate carb diet (changed from regular)  -Continue lantus 24 units at bedtime   -Continue carb coverage w/ 1 unit/15G of carbs   -Continue high intensity insulin resistance sliding scale insulin   -BG AC/HS and 0200 while on insulin  -Hypoglycemia protocol  -Diabetes educator consulted

## 2022-11-05 LAB
ANION GAP SERPL CALCULATED.3IONS-SCNC: 10 MMOL/L (ref 7–15)
BUN SERPL-MCNC: 18.9 MG/DL (ref 8–23)
CALCIUM SERPL-MCNC: 8.2 MG/DL (ref 8.8–10.2)
CHLORIDE SERPL-SCNC: 99 MMOL/L (ref 98–107)
CREAT SERPL-MCNC: 0.95 MG/DL (ref 0.67–1.17)
DEPRECATED HCO3 PLAS-SCNC: 24 MMOL/L (ref 22–29)
ERYTHROCYTE [DISTWIDTH] IN BLOOD BY AUTOMATED COUNT: 14 % (ref 10–15)
GFR SERPL CREATININE-BSD FRML MDRD: 89 ML/MIN/1.73M2
GLUCOSE BLDC GLUCOMTR-MCNC: 263 MG/DL (ref 70–99)
GLUCOSE BLDC GLUCOMTR-MCNC: 265 MG/DL (ref 70–99)
GLUCOSE BLDC GLUCOMTR-MCNC: 266 MG/DL (ref 70–99)
GLUCOSE BLDC GLUCOMTR-MCNC: 273 MG/DL (ref 70–99)
GLUCOSE BLDC GLUCOMTR-MCNC: 276 MG/DL (ref 70–99)
GLUCOSE BLDC GLUCOMTR-MCNC: 322 MG/DL (ref 70–99)
GLUCOSE BLDC GLUCOMTR-MCNC: 408 MG/DL (ref 70–99)
GLUCOSE SERPL-MCNC: 265 MG/DL (ref 70–99)
HCT VFR BLD AUTO: 39.8 % (ref 40–53)
HGB BLD-MCNC: 12.8 G/DL (ref 13.3–17.7)
MCH RBC QN AUTO: 29.5 PG (ref 26.5–33)
MCHC RBC AUTO-ENTMCNC: 32.2 G/DL (ref 31.5–36.5)
MCV RBC AUTO: 92 FL (ref 78–100)
PHOSPHATE SERPL-MCNC: 3.2 MG/DL (ref 2.5–4.5)
PLATELET # BLD AUTO: 422 10E3/UL (ref 150–450)
POTASSIUM SERPL-SCNC: 4.7 MMOL/L (ref 3.4–5.3)
RBC # BLD AUTO: 4.34 10E6/UL (ref 4.4–5.9)
SODIUM SERPL-SCNC: 133 MMOL/L (ref 136–145)
WBC # BLD AUTO: 13.7 10E3/UL (ref 4–11)

## 2022-11-05 PROCEDURE — 36415 COLL VENOUS BLD VENIPUNCTURE: CPT | Performed by: STUDENT IN AN ORGANIZED HEALTH CARE EDUCATION/TRAINING PROGRAM

## 2022-11-05 PROCEDURE — 250N000013 HC RX MED GY IP 250 OP 250 PS 637: Performed by: PHYSICIAN ASSISTANT

## 2022-11-05 PROCEDURE — 99207 PR APP CREDIT; MD BILLING SHARED VISIT: CPT | Performed by: PHYSICIAN ASSISTANT

## 2022-11-05 PROCEDURE — 80048 BASIC METABOLIC PNL TOTAL CA: CPT | Performed by: STUDENT IN AN ORGANIZED HEALTH CARE EDUCATION/TRAINING PROGRAM

## 2022-11-05 PROCEDURE — 85027 COMPLETE CBC AUTOMATED: CPT | Performed by: STUDENT IN AN ORGANIZED HEALTH CARE EDUCATION/TRAINING PROGRAM

## 2022-11-05 PROCEDURE — 84100 ASSAY OF PHOSPHORUS: CPT | Performed by: PHYSICIAN ASSISTANT

## 2022-11-05 PROCEDURE — 250N000012 HC RX MED GY IP 250 OP 636 PS 637: Performed by: PHYSICIAN ASSISTANT

## 2022-11-05 PROCEDURE — 99207 PR CDG-CUT & PASTE-POTENTIAL IMPACT ON LEVEL: CPT | Performed by: STUDENT IN AN ORGANIZED HEALTH CARE EDUCATION/TRAINING PROGRAM

## 2022-11-05 PROCEDURE — 99232 SBSQ HOSP IP/OBS MODERATE 35: CPT | Mod: GC | Performed by: INTERNAL MEDICINE

## 2022-11-05 PROCEDURE — 99233 SBSQ HOSP IP/OBS HIGH 50: CPT | Performed by: STUDENT IN AN ORGANIZED HEALTH CARE EDUCATION/TRAINING PROGRAM

## 2022-11-05 PROCEDURE — 120N000011 HC R&B TRANSPLANT UMMC

## 2022-11-05 RX ORDER — LANOLIN ALCOHOL/MO/W.PET/CERES
3 CREAM (GRAM) TOPICAL
Status: DISCONTINUED | OUTPATIENT
Start: 2022-11-05 | End: 2022-11-07 | Stop reason: HOSPADM

## 2022-11-05 RX ADMIN — METRONIDAZOLE 500 MG: 500 TABLET ORAL at 08:41

## 2022-11-05 RX ADMIN — PANCRELIPASE 4 CAPSULE: 24000; 76000; 120000 CAPSULE, DELAYED RELEASE PELLETS ORAL at 12:44

## 2022-11-05 RX ADMIN — CIPROFLOXACIN HYDROCHLORIDE 500 MG: 500 TABLET, FILM COATED ORAL at 20:44

## 2022-11-05 RX ADMIN — PANCRELIPASE 4 CAPSULE: 24000; 76000; 120000 CAPSULE, DELAYED RELEASE PELLETS ORAL at 08:41

## 2022-11-05 RX ADMIN — METRONIDAZOLE 500 MG: 500 TABLET ORAL at 13:52

## 2022-11-05 RX ADMIN — CIPROFLOXACIN HYDROCHLORIDE 500 MG: 500 TABLET, FILM COATED ORAL at 08:41

## 2022-11-05 RX ADMIN — SENNOSIDES AND DOCUSATE SODIUM 1 TABLET: 8.6; 5 TABLET ORAL at 08:42

## 2022-11-05 RX ADMIN — PANCRELIPASE 4 CAPSULE: 24000; 76000; 120000 CAPSULE, DELAYED RELEASE PELLETS ORAL at 17:08

## 2022-11-05 RX ADMIN — INSULIN ASPART 8 UNITS: 100 INJECTION, SOLUTION INTRAVENOUS; SUBCUTANEOUS at 13:49

## 2022-11-05 RX ADMIN — Medication 3 MG: at 20:44

## 2022-11-05 RX ADMIN — METRONIDAZOLE 500 MG: 500 TABLET ORAL at 20:44

## 2022-11-05 RX ADMIN — LEVOTHYROXINE SODIUM 100 MCG: 100 TABLET ORAL at 08:41

## 2022-11-05 ASSESSMENT — ACTIVITIES OF DAILY LIVING (ADL)
ADLS_ACUITY_SCORE: 22
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
## 2022-11-05 NOTE — PLAN OF CARE
Goal Outcome Evaluation: progressing    Blood pressure 119/79, pulse 88, temperature 98.1  F (36.7  C), temperature source Oral, resp. rate 16, height 1.829 m (6'), weight 95 kg (209 lb 6.4 oz), SpO2 98 %.        Neuro: alert and oriented X4   Cardic: WNL   Respiratory: RA denies SOB  GI/: +BS, 1 BM, voiding spontaneously not saving urine.    Diet: carb consistent diet and carb coverage, tolerating, denies nausea.   Labs: Phosphorus 3.2 no replacement needed, lab draw scheduled for tomorrow.   BG:  and 265 BS corrected based on the sliding scale and carb coverage.   LDA:  R PIV SL, skater drain is flushed every 8 hours.   Skin:  Skater drain area cleaned and dressing changed. No sign of infection noted.  Mobility: up ad jake  Pain: denied pain.  Changes: melatonin tablet 3 mg order at bedtime, insuline glargine dose increased see eMAR.   Plan of Care: Plan to manage BS under control before discharge.
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
## 2022-11-05 NOTE — PLAN OF CARE
VS: /83 (BP Location: Left arm)   Pulse 86   Temp 98.9  F (37.2  C) (Oral)   Resp 17   Ht 1.829 m (6')   Wt 95.8 kg (211 lb 4.8 oz)   SpO2 98%   BMI 28.66 kg/m        Neuro: alert and oriented X4   Cardio: WNL   Respiratory: RA   GI/: continent of bowel and bladder   Skin: intact   Diet: carb consistent diet and carb coverage   Labs: pending AM labs   BG: ADOLPH HS-334, 2AM- 266   LDA:  PIV SL, skater drain is flushed every 8 hours   Mobility:  UAL   Pain: stated some discomfort and denied any interventions   PRN medications: none   Changes: none   Plan of Care:  plan to Discharge on Saturday or Sunday

## 2022-11-05 NOTE — PLAN OF CARE
Goal Outcome Evaluation:  /86 (BP Location: Left arm)   Pulse 87   Temp 98.7  F (37.1  C) (Oral)   Resp 18   Ht 1.829 m (6')   Wt 95 kg (209 lb 6.4 oz)   SpO2 98%   BMI 28.40 kg/m      Shift: [id number]  Isolation Status: NA  VS: WDL on RA, afebrile  Neuro: Aox4  Behaviors: pleasant, cooperative with cares, able to make his needs known  BG: AC/HS, 408 before dinner treated with sliding scale as ordered.  40 units Lantus at 1600  Labs: No new labs  Respiratory: Clear lung sounds, diminished bases, no shortness of breath  Cardiac: regular rhythm/rate  Pain/Nausea: Denied pain and nausea  PRN: NA  Diet: 60 g carb, good appetite  IV Access: LUE PIV saline locked  Infusion(s): NA  Lines/Drains: L abdominal skater drain flushed with 10 mL NS as ordered...  GI/: No BM this shift, voiding without difficulty  Skin: Intact  Mobility: Independent  Events/Education: Showed wife how to irrigate and empty drain  Plan: Monitor BG.  Page endocrine with results of HS BG.

## 2022-11-06 LAB
ANION GAP SERPL CALCULATED.3IONS-SCNC: 10 MMOL/L (ref 7–15)
BUN SERPL-MCNC: 17.1 MG/DL (ref 8–23)
CALCIUM SERPL-MCNC: 8.3 MG/DL (ref 8.8–10.2)
CHLORIDE SERPL-SCNC: 100 MMOL/L (ref 98–107)
CREAT SERPL-MCNC: 0.75 MG/DL (ref 0.67–1.17)
DEPRECATED HCO3 PLAS-SCNC: 23 MMOL/L (ref 22–29)
ERYTHROCYTE [DISTWIDTH] IN BLOOD BY AUTOMATED COUNT: 13.9 % (ref 10–15)
GFR SERPL CREATININE-BSD FRML MDRD: >90 ML/MIN/1.73M2
GLUCOSE BLDC GLUCOMTR-MCNC: 223 MG/DL (ref 70–99)
GLUCOSE BLDC GLUCOMTR-MCNC: 227 MG/DL (ref 70–99)
GLUCOSE BLDC GLUCOMTR-MCNC: 233 MG/DL (ref 70–99)
GLUCOSE BLDC GLUCOMTR-MCNC: 253 MG/DL (ref 70–99)
GLUCOSE BLDC GLUCOMTR-MCNC: 295 MG/DL (ref 70–99)
GLUCOSE SERPL-MCNC: 220 MG/DL (ref 70–99)
HCT VFR BLD AUTO: 38.1 % (ref 40–53)
HGB BLD-MCNC: 12.4 G/DL (ref 13.3–17.7)
MCH RBC QN AUTO: 29.8 PG (ref 26.5–33)
MCHC RBC AUTO-ENTMCNC: 32.5 G/DL (ref 31.5–36.5)
MCV RBC AUTO: 92 FL (ref 78–100)
PHOSPHATE SERPL-MCNC: 3.2 MG/DL (ref 2.5–4.5)
PLATELET # BLD AUTO: 464 10E3/UL (ref 150–450)
POTASSIUM SERPL-SCNC: 4.3 MMOL/L (ref 3.4–5.3)
RBC # BLD AUTO: 4.16 10E6/UL (ref 4.4–5.9)
SODIUM SERPL-SCNC: 133 MMOL/L (ref 136–145)
WBC # BLD AUTO: 12.5 10E3/UL (ref 4–11)

## 2022-11-06 PROCEDURE — 99233 SBSQ HOSP IP/OBS HIGH 50: CPT | Performed by: STUDENT IN AN ORGANIZED HEALTH CARE EDUCATION/TRAINING PROGRAM

## 2022-11-06 PROCEDURE — 85027 COMPLETE CBC AUTOMATED: CPT | Performed by: STUDENT IN AN ORGANIZED HEALTH CARE EDUCATION/TRAINING PROGRAM

## 2022-11-06 PROCEDURE — 99232 SBSQ HOSP IP/OBS MODERATE 35: CPT | Mod: GC | Performed by: INTERNAL MEDICINE

## 2022-11-06 PROCEDURE — 36415 COLL VENOUS BLD VENIPUNCTURE: CPT | Performed by: STUDENT IN AN ORGANIZED HEALTH CARE EDUCATION/TRAINING PROGRAM

## 2022-11-06 PROCEDURE — 99207 PR APP CREDIT; MD BILLING SHARED VISIT: CPT | Performed by: PHYSICIAN ASSISTANT

## 2022-11-06 PROCEDURE — 250N000013 HC RX MED GY IP 250 OP 250 PS 637

## 2022-11-06 PROCEDURE — 250N000013 HC RX MED GY IP 250 OP 250 PS 637: Performed by: PHYSICIAN ASSISTANT

## 2022-11-06 PROCEDURE — 84100 ASSAY OF PHOSPHORUS: CPT | Performed by: STUDENT IN AN ORGANIZED HEALTH CARE EDUCATION/TRAINING PROGRAM

## 2022-11-06 PROCEDURE — 80048 BASIC METABOLIC PNL TOTAL CA: CPT | Performed by: STUDENT IN AN ORGANIZED HEALTH CARE EDUCATION/TRAINING PROGRAM

## 2022-11-06 PROCEDURE — 120N000011 HC R&B TRANSPLANT UMMC

## 2022-11-06 RX ADMIN — SENNOSIDES AND DOCUSATE SODIUM 1 TABLET: 8.6; 5 TABLET ORAL at 08:56

## 2022-11-06 RX ADMIN — CIPROFLOXACIN HYDROCHLORIDE 500 MG: 500 TABLET, FILM COATED ORAL at 08:57

## 2022-11-06 RX ADMIN — METRONIDAZOLE 500 MG: 500 TABLET ORAL at 08:57

## 2022-11-06 RX ADMIN — LEVOTHYROXINE SODIUM 100 MCG: 100 TABLET ORAL at 08:57

## 2022-11-06 RX ADMIN — PANCRELIPASE 4 CAPSULE: 24000; 76000; 120000 CAPSULE, DELAYED RELEASE PELLETS ORAL at 08:56

## 2022-11-06 RX ADMIN — PANCRELIPASE 4 CAPSULE: 24000; 76000; 120000 CAPSULE, DELAYED RELEASE PELLETS ORAL at 17:00

## 2022-11-06 RX ADMIN — PANCRELIPASE 4 CAPSULE: 24000; 76000; 120000 CAPSULE, DELAYED RELEASE PELLETS ORAL at 12:03

## 2022-11-06 RX ADMIN — Medication 3 MG: at 20:39

## 2022-11-06 RX ADMIN — ACETAMINOPHEN 650 MG: 325 TABLET, FILM COATED ORAL at 01:30

## 2022-11-06 RX ADMIN — INSULIN ASPART 7 UNITS: 100 INJECTION, SOLUTION INTRAVENOUS; SUBCUTANEOUS at 12:07

## 2022-11-06 RX ADMIN — Medication 1 MG: at 01:30

## 2022-11-06 ASSESSMENT — ACTIVITIES OF DAILY LIVING (ADL)
ADLS_ACUITY_SCORE: 22

## 2022-11-06 NOTE — PLAN OF CARE
Goal Outcome Evaluation: progressing    Blood pressure 130/78, pulse 96, temperature 97.8  F (36.6  C), temperature source Oral, resp. rate 16, height 1.829 m (6'), weight 94.6 kg (208 lb 8 oz), SpO2 98 %.     Neuro: alert and oriented X4   Cardic: WNL   Respiratory: RA denies SOB  GI/: +BS, 1 BM, voiding spontaneously not saving urine.    Diet: carb consistent diet and carb coverage, tolerating, denies nausea.   Labs: Phosphorus 3.2 no replacement needed, lab draw scheduled for tomorrow.   BG:  and 253 BS corrected based on the sliding scale and carb coverage.   LDA:  R PIV SL, skater drain is flushed every 8 hours.   Skin:  Skater drain area cleaned and dressing changed. No sign of infection noted.  Mobility: up ad jake  Pain: denied pain.  Changes:  insuline glargine dose increased see eMAR.   Plan of Care: Plan to manage BS under control before discharge.

## 2022-11-06 NOTE — PROGRESS NOTES
Brief Care Coordination Note    Chart reviewed. Primary team paged. Patient is not medically ready for discharge, continues to have elevated blood glucose levels. The team is hopeful for discharge to home tomorrow if medically stable. Care coordination will continue to follow for discharge planning.     [facility] ([facility]  Phone: [phone]  Fax [phone]    Tresa Helm, ARNOLDOCC, BSN    [facility]    [address]  [address] [place] [address]    [email or web address]    Office: [phone] Pager: [phone]    To contact the weekend RNCC  [place] (0800 - 1630) Saturday and Sunday    Units: 4A, 4C, 4E, 5A and 5B- Pager 1: [phone]    Units: 6A, 6B, 6C, 6D- Pager 2: [phone]    Units: 7A, 7B, 7C, 7D, and 5C-Pager 3: [phone]
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
## 2022-11-06 NOTE — PLAN OF CARE
Goal Outcome Evaluation:  /80 (BP Location: Left arm)   Pulse 89   Temp 98.1  F (36.7  C) (Oral)   Resp 16   Ht 1.829 m (6')   Wt 94.6 kg (208 lb 8 oz)   SpO2 96%   BMI 28.28 kg/m      Shift: 1621-7556  Isolation Status: NA  VS: WDL on RA, afebrile  Neuro: Aox4  Behaviors: pleasant, cooperative with cares, able to make his needs known  BG: AC/HS, 295 before dinner treated with sliding scale and carbs covered.  50 units Lantus at 1600  Labs: No new labs  Respiratory: Clear lung sounds, diminished bases.  IS encouraged  Cardiac: Regular rhythm/rate  Pain/Nausea: Denied pain and nausea  PRN: NA  Diet: 60 g carb, good appetite  IV Access: LH PIV saline locked  Infusion(s): NA  Lines/Drains: Skater drain irrigated q8h with 135 mL output.  GI/: One unsaved BM.  Voiding without saving.  Skin: Intact  Mobility: Independent  Plan: Continue to monitor BG.  Possible discharge 11/7

## 2022-11-06 NOTE — PLAN OF CARE
VS: /71 (BP Location: Left arm)   Pulse 88   Temp 97.9  F (36.6  C) (Oral)   Resp 18   Ht 1.829 m (6')   Wt 95 kg (209 lb 6.4 oz)   SpO2 97%   BMI 28.40 kg/m        Neuro: alert and oriented X4   Cardio: WNL   Respiratory: RA   GI/: continent of bowel and bladder   Skin: intact with left side drain on abdomen   Diet:  mod cons carb    Labs: pending AM   BG: AC HS- 263, 2 AM- 223 1930-322   LDA:  PIV SL, Skater drain flushed every 8 hours   Mobility: UAL    Pain: has some pain in the abdomen   PRN medications: Tylenol and melatonin was effective   Changes: none   Plan of Care:  continue to monitor BG when stable will discharge home

## 2022-11-06 NOTE — PROGRESS NOTES
IP Diabetes Management  Daily Note   Vernon Ortiz  Age: 62 year old  MRN # [id number]   YOB: 1960         Assessment and Plan:   HPI:    Vernon Ortiz is a 62 year old male admitted on 10/26/2022. He has a history of HLD and hypothyroidism and is admitted for necrotizing pancreatitis and sepsis     Assessment:   1. Hyperglycemia secondary to necrotizing pancreatitis and or infection and or stress vs new  diabetes  2. Hyperlipidemia  3. Hypothyroid    Plan:   -Increase  Lantus to 50 units every 24 hours -- likely require higher dose  -Continue Novolog Carb coverage to 1 unit of insulin to 8 g of carbohydrate --  likely require higher dose  -Continue Novolog high sliding scale bg>140  1:25  -BG monitoring TID AC, HS, 0200  -hypoglycemia protocol  -recommend continuing consistent carb diet with carb counting protocol  -diabetes education to be done today by CNS  -on discharge, will recommend outpatient follow up with primary care provider.  Patient declined visit with [facility] End[facility]        Interval History and Assessment: interval glucose trend reviewed:    Patient blood sugars continue to be elevated above 200 even after increasing his Lantus to 40 units.        Other Active Medical Problems: Hypothyroid and hyperlipidemia  Diabetes Mellitus Type:  Hyperglycemia secondary to necrotizing Pancreatitis and infection and stress vs new type 2 diabetes  Duration:  Admitted with  and 418  Diabetic Complications: No complications.  No numbness or tingling. No issues with eyes  Prior to Admission Diabetes Regimen:      BG monitor: New  High blood sugars  Frequency of checks: New High blood sugars  Diet:  No specific diet.  He has eggs, cereal toast for breakfast, sandwich for lunch, and grills for dinner  Usual BG control PTA:Not applicable  History of DKA: No  Able to Detect Hypoglycemia: Not applicable  Usual Diabetes Care Provider: Not applicable  Primary Care Provider: MANISH   ROBI  Factors Impacting IP Glucose Control:   Current Diet:  Moderate consistent carb diet  Activity:  Golfs 4 rounds/week, walks, fishes    10 point ROS completed with pertinent positives and negatives noted in the HPI  Past medical, family and social histories are reviewed and updated.    Social History    Tobacco:  Smoked for  6 years about 1 pack per day, quit when he was 22  Years old    Alcohol:  Says he is a moderate drinker- last ETOH was a beer with pizza    Marital Status:     Place of Residence:   [place]    Occupation: Retired Law enforcement    Family History  No Diabetes in family or pancreatitis in family  Dad had some heart disease, mom had stomach cancer     Physical Exam   /89 (BP Location: Left arm)   Pulse 82   Temp 97.6  F (36.4  C) (Oral)   Resp 18   Ht 1.829 m (6')   Wt 94.6 kg (208 lb 8 oz)   SpO2 98%   BMI 28.28 kg/m    General: pleasant, in no distress.Laying in bed   Mental status:  alert, oriented to self, place, time  Psych:   calm and appropriate interaction     Most Recent Laboratory Tests:  Recent Labs   Lab 11/06/22  0643   HGB 12.4*     No results for input(s): A1C in the last 168 hours.  Recent Labs   Lab 11/06/22  0643   CR 0.75     Recent Labs   Lab 11/06/22  1201 11/06/22  0843 11/06/22  0643 11/06/22  0128 11/05/22  2236 11/05/22  1936   * 227* 220* 223* 263* 322*                     ROUTINE IP LABS (Last four results)  BMP  Recent Labs   Lab 11/06/22  1201 11/06/22  0843 11/06/22  0643 11/06/22  0128 11/05/22  0837 11/05/22  0542 11/04/22  0548 11/04/22  0540 11/03/22  0848 11/03/22  0701   NA  --   --  133*  --   --  133*  --  133*  --  132*   POTASSIUM  --   --  4.3  --   --  4.7  --  4.4  --  4.5   CHLORIDE  --   --  100  --   --  99  --  99  --  98   DOLORES  --   --  8.3*  --   --  8.2*  --  8.2*  --  7.9*   CO2  --   --  23  --   --  24  --  21*  --  15*   BUN  --   --  17.1  --   --  18.9  --  19.7  --  20.2   CR  --   --  0.75  --   --   0.95  --  0.87  --  0.93   * 227* 220* 223*   < > 265*   < > 259*   < > 270*    < > = values in this interval not displayed.     CBC  Recent Labs   Lab 11/06/22  0643 11/05/22  0542 11/04/22  0540 11/03/22  0701   WBC 12.5* 13.7* 17.6* 21.7*   RBC 4.16* 4.34* 4.47 4.45   HGB 12.4* 12.8* 13.2* 13.3   HCT 38.1* 39.8* 40.4 39.8*   MCV 92 92 90 89   MCH 29.8 29.5 29.5 29.9   MCHC 32.5 32.2 32.7 33.4   RDW 13.9 14.0 14.1 14.1   * 422 346 293     Reviewed current blood glucoses:      Unclear etiology of elevated bg. Needing large amount of insulin.  Reviewed care everywhere, had one gluc=136 but all A1C below prediabetes.     Discussed plan of care with patient and primary team in person and nursing through this note   Inpatient Diabetes will continue to follow.     To contact Endocrine Diabetes service:   From 8AM-4PM: page inpatient diabetes provider that is following the patient  For questions or updates from 4PM-8AM: page the diabetes job code for on call fellow: [phone]    Deena Osuna  Endocrinology Fellow  [phone]    I was present with the fellow who participated in the service and in the documentation of the note. I have verified the history and personally performed the physical exam and medical decision making. I agree with the assessment and plan of care as documented in the note.     Evaristo Ng MD  Division of Diabetes and Endocrinology  Department of Medicine

## 2022-11-07 VITALS
HEART RATE: 91 BPM | DIASTOLIC BLOOD PRESSURE: 83 MMHG | WEIGHT: 208.5 LBS | OXYGEN SATURATION: 98 % | SYSTOLIC BLOOD PRESSURE: 130 MMHG | HEIGHT: 72 IN | RESPIRATION RATE: 16 BRPM | BODY MASS INDEX: 28.24 KG/M2 | TEMPERATURE: 97.6 F

## 2022-11-07 LAB
ANION GAP SERPL CALCULATED.3IONS-SCNC: 11 MMOL/L (ref 7–15)
BUN SERPL-MCNC: 14.6 MG/DL (ref 8–23)
CALCIUM SERPL-MCNC: 8.1 MG/DL (ref 8.8–10.2)
CHLORIDE SERPL-SCNC: 100 MMOL/L (ref 98–107)
CREAT SERPL-MCNC: 0.77 MG/DL (ref 0.67–1.17)
DEPRECATED HCO3 PLAS-SCNC: 21 MMOL/L (ref 22–29)
ERYTHROCYTE [DISTWIDTH] IN BLOOD BY AUTOMATED COUNT: 13.8 % (ref 10–15)
GFR SERPL CREATININE-BSD FRML MDRD: >90 ML/MIN/1.73M2
GLUCOSE BLDC GLUCOMTR-MCNC: 147 MG/DL (ref 70–99)
GLUCOSE BLDC GLUCOMTR-MCNC: 188 MG/DL (ref 70–99)
GLUCOSE BLDC GLUCOMTR-MCNC: 200 MG/DL (ref 70–99)
GLUCOSE SERPL-MCNC: 154 MG/DL (ref 70–99)
HCT VFR BLD AUTO: 40 % (ref 40–53)
HGB BLD-MCNC: 12.9 G/DL (ref 13.3–17.7)
MCH RBC QN AUTO: 29.7 PG (ref 26.5–33)
MCHC RBC AUTO-ENTMCNC: 32.3 G/DL (ref 31.5–36.5)
MCV RBC AUTO: 92 FL (ref 78–100)
PHOSPHATE SERPL-MCNC: 3.1 MG/DL (ref 2.5–4.5)
PLATELET # BLD AUTO: 501 10E3/UL (ref 150–450)
POTASSIUM SERPL-SCNC: 4.2 MMOL/L (ref 3.4–5.3)
RBC # BLD AUTO: 4.35 10E6/UL (ref 4.4–5.9)
SODIUM SERPL-SCNC: 132 MMOL/L (ref 136–145)
WBC # BLD AUTO: 12.5 10E3/UL (ref 4–11)

## 2022-11-07 PROCEDURE — 99233 SBSQ HOSP IP/OBS HIGH 50: CPT | Performed by: CLINICAL NURSE SPECIALIST

## 2022-11-07 PROCEDURE — 250N000013 HC RX MED GY IP 250 OP 250 PS 637: Performed by: PHYSICIAN ASSISTANT

## 2022-11-07 PROCEDURE — 99207 PR APP CREDIT; MD BILLING SHARED VISIT: CPT | Performed by: PHYSICIAN ASSISTANT

## 2022-11-07 PROCEDURE — 36415 COLL VENOUS BLD VENIPUNCTURE: CPT | Performed by: STUDENT IN AN ORGANIZED HEALTH CARE EDUCATION/TRAINING PROGRAM

## 2022-11-07 PROCEDURE — 84100 ASSAY OF PHOSPHORUS: CPT | Performed by: PHYSICIAN ASSISTANT

## 2022-11-07 PROCEDURE — 99239 HOSP IP/OBS DSCHRG MGMT >30: CPT | Performed by: STUDENT IN AN ORGANIZED HEALTH CARE EDUCATION/TRAINING PROGRAM

## 2022-11-07 PROCEDURE — 82310 ASSAY OF CALCIUM: CPT | Performed by: STUDENT IN AN ORGANIZED HEALTH CARE EDUCATION/TRAINING PROGRAM

## 2022-11-07 PROCEDURE — 85027 COMPLETE CBC AUTOMATED: CPT | Performed by: STUDENT IN AN ORGANIZED HEALTH CARE EDUCATION/TRAINING PROGRAM

## 2022-11-07 RX ORDER — CONTAINER,EMPTY
1 EACH MISCELLANEOUS
Qty: 1 EACH | Refills: 0 | Status: SHIPPED | OUTPATIENT
Start: 2022-11-07

## 2022-11-07 RX ADMIN — LEVOTHYROXINE SODIUM 100 MCG: 100 TABLET ORAL at 08:13

## 2022-11-07 RX ADMIN — SENNOSIDES AND DOCUSATE SODIUM 1 TABLET: 8.6; 5 TABLET ORAL at 08:13

## 2022-11-07 RX ADMIN — INSULIN ASPART 12 UNITS: 100 INJECTION, SOLUTION INTRAVENOUS; SUBCUTANEOUS at 12:12

## 2022-11-07 RX ADMIN — PANCRELIPASE 4 CAPSULE: 24000; 76000; 120000 CAPSULE, DELAYED RELEASE PELLETS ORAL at 08:13

## 2022-11-07 RX ADMIN — PANCRELIPASE 4 CAPSULE: 24000; 76000; 120000 CAPSULE, DELAYED RELEASE PELLETS ORAL at 12:10

## 2022-11-07 ASSESSMENT — ACTIVITIES OF DAILY LIVING (ADL)
ADLS_ACUITY_SCORE: 22

## 2022-11-07 NOTE — PROGRESS NOTES
Care Management Discharge Note    Discharge Date: 11/07/2022       Discharge Disposition: Home, Home Care    Discharge Services: None    Discharge DME: None    Discharge Transportation: family or friend will provide    Patient/family educated on Medicare website which has current facility and service quality ratings: yes    Education Provided on the Discharge Plan:  yes  Persons Notified of Discharge Plans: yes  Patient/Family in Agreement with the Plan: yes    Handoff Referral Completed: Yes    Additional Information:  Pt discharging home today.  Updated Kirsten, [phone] [facility].   Final discharge orders faxed to [facility]    Shannon Souza RN BSN, PHN, ACM-RN  7A RN Care Coordinator  Phone: [phone]  Pager [phone]    To contact the weekend [facility] (0800 - 1630) Saturday and Sunday    Units: 4A, 4C, 4E, 5A and 5B- Pager 1: [phone]    Units: 6A, 6B, 6C, 6D- Pager 2: [phone]    Units: 7A, 7B, 7C, 7D, and 5C-Pager 3: [phone]    [facility] ([phone]) Saturday and Sunday    Units: 5 Ortho, 8A, 10 ICU, & Pediatric Units-Pager 4: [phone]    11/7/2022 2:28 PM          Reshma Souza, RN

## 2022-11-07 NOTE — PLAN OF CARE
VS: /78 (BP Location: Left arm)   Pulse 84   Temp 98.1  F (36.7  C) (Oral)   Resp 16   Ht 1.829 m (6')   Wt 94.6 kg (208 lb 8 oz)   SpO2 97%   BMI 28.28 kg/m        Neuro: alert and oriented X4   Cardio: WNL   Respiratory: RA   GI/: continent of bowel and bladder   Skin: intact   Diet:   Mod consistent carb   Labs: pending AM   BG: HS- 233, 0200-200  LDA: skater drain flushed every 8 hours    Mobility:  UAL   Pain: denied   PRN medications: none   Changes: none   Plan of Care:  Possible discharge today to home
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
## 2022-11-07 NOTE — PROGRESS NOTES
IP Diabetes Management  Daily Note   Vernon Ortiz  Age: 62 year old  MRN # [id number]   YOB: 1960         Assessment and Plan:   HPI:    Vernon Ortiz is a 62 year old male admitted on 10/26/2022. He has a history of HLD and hypothyroidism and is admitted for necrotizing pancreatitis and sepsis     Assessment:   1. Hyperglycemia secondary to necrotizing pancreatitis and or infection and or stress vs new  diabetes  2. Hyperlipidemia  3. Hypothyroid    Plan:   -continue  Lantus to 50 units every 24 hours approx 4 pm   -Increase Novolog Carb coverage from 1 unit of insulin to 8 g of carbohydrate with meals to 1 per 5 grams with meals and from 1 unit per 15 grams carb for snacks to 1 unit per 5 grams carb for snacks as well  -Continue Novolog high sliding scale bg>140  1:25  -BG monitoring TID AC, HS, 0200  -hypoglycemia protocol  -recommend continuing consistent carb diet with carb counting protocol  -diabetes education done 11/4  -on discharge, will recommend outpatient follow up with primary care provider Dr. Dawson--who is aware of pt needs (primary communicated).  Patient declined visit with [facility] Endocrinology clinic        Plan for HOME--  Monitor blood glucose three times a day before each meal and at bedtime.  Before each meal, take Novolog Correction based on blood glucose  Do Not give Correction Insulin if Pre-Meal BG less than 140.    For Pre-Meal  - 164 give 1 unit.    For Pre-Meal  - 189 give 2 units.    For Pre-Meal  - 214 give 3 units.    For Pre-Meal  - 239 give 4 units.    For Pre-Meal  - 264 give 5 units.    For Pre-Meal  - 289 give 6 units.    For Pre-Meal  - 314 give 7 units.    For Pre-Meal  - 339 give 8 units.    For Pre-Meal  - 364 give 9 units.    For Pre-Meal BG greater than or equal to 365 give 10 units      Plus   Novolog 1 unit for every 5 grams of Carbohydrate eaten     At 4:00 PM:  Long-Acting Insulin Basaglar  (glargine) 50 units each evening at 4:00 PM     At 10:00 PM:  Novolog Bedtime Correction based on blood glucose at 10:00 PM  Do Not give Bedtime Correction Insulin if BG less than 200.    For  - 224 give 1 units.    For  - 249 give 2 units.    For  - 274 give 3 units.    For  - 299 give 4 units.    For  - 324 give 5 units.    For  - 349 give 6 units.    For BG greater than or equal to 350 give 7 units.        Monitor impact of your activity and eating on BG trends.  Notify Dr. Dawson if your glucose is persistently greater than 180 or less than 110.    If you notice unexplained high BG (ex. Cannot explain it by forgetting to cover the carbs in a cookie you ate), pay attention.  High BG can be a sign of infection.      Stay connected with your clinic diabetes resources-- educator RN and educator dietician.  They'll teach you the glucose sensor and aid in adjusting your insulin plan as you move forward.      Monitor impact of your activity and eating on BG trends.  Notify Dr. Dawson if your glucose is persistently greater than 180 or less than 110.    If you notice unexplained high BG (ex. Cannot explain it by forgetting to cover the carbs in a cookie you ate), pay attention.  High BG can be a sign of infection.      Stay connected with your clinic diabetes resources-- educator RN and educator dietician.  They'll teach you the glucose sensor and aid in adjusting your insulin plan as you move forward.    Goal A1C is customized to the individual.  Your goal may be 6.5-7% if achieved without low glucose.      Interval History and Assessment: interval glucose trend reviewed:    Fasting glucose finally reaching target:      Vernon is anxious to go home.  Feels improved.  Reviewed the treatment plan he received last Friday and the increased dosing proposed now.  Feels good about treatment plan.  Discussed factors influencing BG at home-- activity change in eating,   Reviewed low BG  treatment and when to call PCP.  Will need to follow up on Freestyle asha PA outpt.      Diet:  No specific diet.  He has eggs, cereal toast for breakfast, sandwich for lunch, and grills for dinner  Activity: usually active-- Golfs 4 rounds/week, walks, fishes    Primary Care Provider: MANISH ROSENBAUM          Social History    Tobacco:  Smoked for  6 years about 1 pack per day, quit when he was 22  Years old    Alcohol:  Says he is a moderate drinker- last ETOH was a beer with pizza    Marital Status:     Place of Residence:   [place]    Occupation: Retired Law enforcement    Family History  No Diabetes in family or pancreatitis in family  Dad had some heart disease, mom had stomach cancer     Physical Exam   /83 (BP Location: Left arm)   Pulse 91   Temp 97.6  F (36.4  C) (Oral)   Resp 16   Ht 1.829 m (6')   Wt 94.6 kg (208 lb 8 oz)   SpO2 98%   BMI 28.28 kg/m    General: pleasant, in no distress.Laying in bed . Wife at bedside  Resp: unlabored breathing  Abd: drainage bag visible left abd  Mental status:  alert, oriented to self, place, time  Psych:   calm and appropriate interaction     Most Recent Laboratory Tests:  Recent Labs   Lab 11/07/22  0837   HGB 12.9*     No results for input(s): A1C in the last 168 hours.  Recent Labs   Lab 11/07/22  0837   CR 0.77     Recent Labs   Lab 11/07/22  0837 11/07/22  0810 11/07/22  0216 11/06/22  2253 11/06/22  1656 11/06/22  1201   * 147* 200* 233* 295* 253*                     ROUTINE IP LABS (Last four results)  BMP  Recent Labs   Lab 11/07/22  0837 11/07/22  0810 11/07/22  0216 11/06/22  2253 11/06/22  0843 11/06/22  0643 11/05/22  0837 11/05/22  0542 11/04/22  0548 11/04/22  0540   *  --   --   --   --  133*  --  133*  --  133*   POTASSIUM 4.2  --   --   --   --  4.3  --  4.7  --  4.4   CHLORIDE 100  --   --   --   --  100  --  99  --  99   DOLORES 8.1*  --   --   --   --  8.3*  --  8.2*  --  8.2*   CO2 21*  --   --   --   --  23  --   24  --  21*   BUN 14.6  --   --   --   --  17.1  --  18.9  --  19.7   CR 0.77  --   --   --   --  0.75  --  0.95  --  0.87   * 147* 200* 233*   < > 220*   < > 265*   < > 259*    < > = values in this interval not displayed.     CBC  Recent Labs   Lab 11/07/22  0837 11/06/22  0643 11/05/22  0542 11/04/22  0540   WBC 12.5* 12.5* 13.7* 17.6*   RBC 4.35* 4.16* 4.34* 4.47   HGB 12.9* 12.4* 12.8* 13.2*   HCT 40.0 38.1* 39.8* 40.4   MCV 92 92 92 90   MCH 29.7 29.8 29.5 29.5   MCHC 32.3 32.5 32.2 32.7   RDW 13.8 13.9 14.0 14.1   * 464* 422 346         To contact Endocrine Diabetes service:   From 8AM-4PM: page inpatient diabetes provider that is following the patient  For questions or updates from 4PM-8AM: page the diabetes job code for on call fellow: [phone]    I spent a total of 40 minutes bedside and on the inpatient unit managing the glycemic care of Vernon Ortiz. Over 50% of my time on the unit was spent counseling the patient and wife and/or coordinating care regarding acute glycemic management and/or discharge planning.  See note for details.

## 2022-11-07 NOTE — PLAN OF CARE
Goal Outcome Evaluation: met     Neuro: alert and oriented X4   Cardic: WNL   Respiratory: RA denies SOB  GI/: +BS, 1 BM, voiding spontaneously not saving urine.    Diet: carb consistent diet and carb coverage, tolerating, denies nausea.   Labs: Phosphorus 3.1 no replacement needed, lab draw scheduled for tomorrow.   BG:  and 188 BS corrected based on the sliding scale and carb coverage.   LDA:  R PIV SL, skater drain is flushed every 8 hours.   Skin:  Skater drain area cleaned and dressing changed. No sign of infection noted.  Mobility: up ad jake  Pain: denied pain.  Plan of Care: patient will be discharging this afternoon.

## 2022-11-07 NOTE — PROGRESS NOTES
DISCHARGE NOTE    Patient discharged to home at 2:43 PM via ambulation. Accompanied by spouse and staff. Discharge instructions reviewed with patient and spouse, opportunity offered to ask questions. Prescriptions filled and sent with patient upon discharge. All belongings sent with patient. PIV removed.     Esther Macdonald RN

## 2022-11-07 NOTE — PROGRESS NOTES
"SPIRITUAL HEALTH SERVICES Progress Note  [facility] ([place]) 7a     visited pt via Length of Stay    Pt and family were present. This  overheard medical staff inform pt he is getting discharged today. Pt told this  he feels \"good\" about getting discharged. Pt and family affirmed it has been \"a long road\".     Pt stated \"we are very supported\" and shared they have a [employer] community back home that has been \"praying\".     No follow up planned at this time    Juan Luis Calle MDiv  Chaplain Resident  Pager [phone]    * [facility] remains available 24/7 for emergent requests/referrals, either by having the switchboard page the on-call  or by entering an ASAP/STAT consult in Epic (this will also page the on-call ). Routine Epic consults receive an initial response within 24 hours.*   "

## 2022-11-08 ENCOUNTER — PATIENT OUTREACH (OUTPATIENT)
Dept: GASTROENTEROLOGY | Facility: CLINIC | Age: 62
End: 2022-11-08

## 2022-11-09 NOTE — TELEPHONE ENCOUNTER
Called pt/wife back again to check in after admission    Ache on right side, to the left of the drain- 3-4 out of 10- no fever. Tolerating blood sugars getting better, trouble sleeping. Otherwise ok, good appetite    Pt has drain check and CT scheduled on 11-15, will ask Dr. Santos if he wants another CT prior to clinic on 11/30.    Number provided to clinic for questions/concerns.    Tricia Page RN Care Coordinator

## 2022-11-10 ENCOUNTER — TRANSFERRED RECORDS (OUTPATIENT)
Dept: HEALTH INFORMATION MANAGEMENT | Facility: CLINIC | Age: 62
End: 2022-11-10

## 2022-11-15 ENCOUNTER — ANCILLARY PROCEDURE (OUTPATIENT)
Dept: CT IMAGING | Facility: CLINIC | Age: 62
End: 2022-11-15
Attending: PHYSICIAN ASSISTANT
Payer: COMMERCIAL

## 2022-11-15 ENCOUNTER — ANCILLARY PROCEDURE (OUTPATIENT)
Dept: GENERAL RADIOLOGY | Facility: CLINIC | Age: 62
End: 2022-11-15
Attending: PHYSICIAN ASSISTANT
Payer: COMMERCIAL

## 2022-11-15 DIAGNOSIS — K85.91 NECROTIZING PANCREATITIS: ICD-10-CM

## 2022-11-15 PROCEDURE — 74177 CT ABD & PELVIS W/CONTRAST: CPT | Mod: GC | Performed by: RADIOLOGY

## 2022-11-15 PROCEDURE — 76080 X-RAY EXAM OF FISTULA: CPT | Performed by: PHYSICIAN ASSISTANT

## 2022-11-15 PROCEDURE — 49424 ASSESS CYST CONTRAST INJECT: CPT | Performed by: PHYSICIAN ASSISTANT

## 2022-11-15 RX ORDER — IOPAMIDOL 755 MG/ML
127 INJECTION, SOLUTION INTRAVASCULAR ONCE
Status: COMPLETED | OUTPATIENT
Start: 2022-11-15 | End: 2022-11-15

## 2022-11-15 RX ADMIN — IOPAMIDOL 127 ML: 755 INJECTION, SOLUTION INTRAVASCULAR at 11:43

## 2022-11-15 NOTE — PROGRESS NOTES
Interventional Radiology Brief Post Procedure Note    Procedure: Sinogram.    Proceduralist: Blanche Yates PA-C    Assistant: IDANIA Brito    Time Out: Prior to the start of the procedure and with procedural staff participation, I verbally confirmed the patient s identity using two indicators, relevant allergies, that the procedure was appropriate and matched the consent or emergent situation, and that the correct equipment/implants were available. Immediately prior to starting the procedure I conducted the Time Out with the procedural staff and re-confirmed the patient s name, procedure, and site/side. (The Joint Commission universal protocol was followed.)  Yes    Medications   Medication Event Details Admin User Admin Time       Sedation: None.    Findings: Reports 90 ml output in the last 24 hours. Denies fever, pain, chills, tremor, or N/V. CT prior demonstrates interval increased complex peripancreatic collection extending to the spleen, encasing the splenic artery. Sinogram with 40 ml dilute contrast/saline demonstrates diffuse movement of contrast into the complex collection, consistent with prior CT. No definitve communication with bowel was observed. Drain remains in place.    Estimated Blood Loss: None    Fluoroscopy Time:  Less than 1 minute(s)    SPECIMENS: None    Complications: 1. None     Condition: Stable    Plan: Follow up per primary team. Consider surgical/GI team to assess for alternative management.    Comments: See dictated procedure note for full details.    Mauricio Thomas PA-C

## 2022-11-16 ENCOUNTER — PATIENT OUTREACH (OUTPATIENT)
Dept: GASTROENTEROLOGY | Facility: CLINIC | Age: 62
End: 2022-11-16

## 2022-11-16 NOTE — TELEPHONE ENCOUNTER
Called wife, per Dr. Santos, CT from 11/15/22 ok, no need for new CT prior to clinic on 11/30. Next step is clinic with Dr. Santos on 11/30    ML

## 2022-11-21 ENCOUNTER — DOCUMENTATION ONLY (OUTPATIENT)
Dept: GASTROENTEROLOGY | Facility: CLINIC | Age: 62
End: 2022-11-21

## 2022-11-21 NOTE — PROGRESS NOTES
Called PT's spouse and confirmed Appt.    Called to remind patient of their upcoming appointment with our GI clinic, on 11/30/22 at 12:00 PM with Dr. Santos. This appointment is scheduled as a video visit. You will receive a call approximately 30 minutes prior to check you in, you must be in [place] for this visit., if your appointment is virtual (video or telephone) you need to be in [place] for the visit. To reschedule or cancel patient to call [phone].    SK

## 2022-11-30 ENCOUNTER — VIRTUAL VISIT (OUTPATIENT)
Dept: GASTROENTEROLOGY | Facility: CLINIC | Age: 62
End: 2022-11-30
Payer: COMMERCIAL

## 2022-11-30 VITALS — BODY MASS INDEX: 27.67 KG/M2 | WEIGHT: 204 LBS

## 2022-11-30 DIAGNOSIS — K85.91 NECROTIZING PANCREATITIS: Primary | ICD-10-CM

## 2022-11-30 PROCEDURE — 99203 OFFICE O/P NEW LOW 30 MIN: CPT | Mod: 95 | Performed by: INTERNAL MEDICINE

## 2022-11-30 ASSESSMENT — PAIN SCALES - GENERAL: PAINLEVEL: NO PAIN (0)
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
## 2022-12-01 ENCOUNTER — PATIENT OUTREACH (OUTPATIENT)
Dept: GASTROENTEROLOGY | Facility: CLINIC | Age: 62
End: 2022-12-01

## 2022-12-01 ENCOUNTER — PREP FOR PROCEDURE (OUTPATIENT)
Dept: GASTROENTEROLOGY | Facility: CLINIC | Age: 62
End: 2022-12-01

## 2022-12-01 ENCOUNTER — ANCILLARY PROCEDURE (OUTPATIENT)
Dept: CT IMAGING | Facility: CLINIC | Age: 62
End: 2022-12-01
Attending: INTERNAL MEDICINE
Payer: COMMERCIAL

## 2022-12-01 DIAGNOSIS — K85.91 NECROTIZING PANCREATITIS: ICD-10-CM

## 2022-12-01 DIAGNOSIS — K85.91 NECROTIZING PANCREATITIS: Primary | ICD-10-CM

## 2022-12-01 PROCEDURE — 74177 CT ABD & PELVIS W/CONTRAST: CPT | Mod: GC | Performed by: RADIOLOGY

## 2022-12-01 RX ORDER — IOPAMIDOL 755 MG/ML
125 INJECTION, SOLUTION INTRAVASCULAR ONCE
Status: COMPLETED | OUTPATIENT
Start: 2022-12-01 | End: 2022-12-01

## 2022-12-01 RX ADMIN — IOPAMIDOL 125 ML: 755 INJECTION, SOLUTION INTRAVASCULAR at 15:54

## 2022-12-01 NOTE — PROGRESS NOTES
Called pt as followup to Dr Santos's clinic yesterday. Order for CT abd/pelvis with contrast placed and per Dr Santos's request patient is getting this scheduled asap. Will update team once CT is resulted as to next steps.    Judith Rahman, RN, BSN,   [facility]  Care coordinator

## 2022-12-02 NOTE — TELEPHONE ENCOUNTER
REFERRAL INFORMATION:    Referring Provider:  Dr. Juan Santos     Referring Clinic:  [facility]     Reason for Visit/Diagnosis: Necrotizing pancreatitis        FUTURE VISIT INFORMATION:    Appointment Date: 12/7/2022    Appointment Time: 9 AM      NOTES RECORD STATUS  DETAILS   OFFICE NOTE from Referring Provider Internal 11/30/2022 Office visit with Dr. Santos      OFFICE NOTE from Other Specialists N/A    HOSPITAL DISCHARGE SUMMARY/ ED VISITS  Internal/ Care Everywhere 10/26/2022 ([facility])  10/25/2022 ([facility])   OPERATIVE REPORT N/A    ENDOSCOPY (EGD)  N/A    PERTINENT LABS Care Everywhere    PATHOLOGY REPORTS (RELATED) N/A    IMAGING (CT, MRI, US, XR)  Care Everywhere [facility] ([facility]):  - US Abdomen: 10/25/2022  - CT Abdomen Pelvis: 10/25/2022  - XR Abdomen: 10/25/2022

## 2022-12-05 NOTE — PROGRESS NOTES
Called to discuss with patient.     EUS with necrosectomy with Dr Ellison on 12/8/22    Explained they will need a , someone to stay with them for 24 hours and should stay in town for 24 hours (within 45 min of Hospital) post procedure    Patient needs to get pre-op physical completed. If outside [facility] will need physical faxed to number [phone]   If you do not get a preop physical, your procedure could be cancelled, patient voiced understanding*    Pt will stay locally with family in [place]    Preop Plan: 11/10/22 office visit with PCP Dr Dawson to be used for pre op     Med Review    Blood thinner -  none  ASA - none  Diabetic - yes, on 15 units long acting, advised he only take 80% of this dose the day before procedure.    COVID test discussed: no longer needed, unless patient symptomatic    Patient Education r/t procedure: isac    A pre-op nurse will call 1-2 days prior to the procedure.    NPO/Prep:   Adults and Children of all ages may consume solids up to 8 hours prior to arrival time - may consume clear liquids up to 1 hour prior to arrival time.    Verbalized understanding of all instructions. All questions answered.     Procedure order placed, message routed to OR

## 2022-12-06 VITALS — HEIGHT: 71 IN | BODY MASS INDEX: 28.42 KG/M2 | WEIGHT: 203 LBS

## 2022-12-06 ASSESSMENT — PAIN SCALES - GENERAL: PAINLEVEL: NO PAIN (0)

## 2022-12-06 NOTE — PROGRESS NOTES
Vernon Ortiz is a 62 year old who is being evaluated via a billable video visit.      How would you like to obtain your AVS? MyChart  If the video visit is dropped, the invitation should be resent by: Text to cell phone: [phone]  Will anyone else be joining your video visit? No  If patient encounters technical issues they should call [phone]    During this virtual visit the patient is located in [place], patient verifies this as the location during the entirety of this visit.     Video-Visit Details  Video Start Time: 9:24 AM    Type of service:  Video Visit    Video End Time:9:44 AM    Originating Location (pt. Location): Home    Distant Location (provider location):  On-site    Platform used for Video Visit: Heather     I spoke with Vernon Ortiz today with his wife over [facility] regarding his necrotizing pancreatitis. He is about 3.5weeks into his course- unclear what caused the pancreatitis- though possibly trauma related.  He has an anterior abdominal drain currently.  He is at home after being an inpatient at [facility].  He is scheduled to undergo an EGD with attempt at EUS access of the collection.  He had a CT scan one week ago which showed stable size of the necrotic collection.    He thinks about 100ml a day comes from the drain (after subtracting the flushing volume).    He is doing ok.  Not at 100%, tires easily.  He denies any infectious symptoms. Tolerating a diet without nausea/vomiting.     Taking Creon  On a carb restricted diet  15u long acting insulin  3-4u insulin at Meal time  Glucose running 110-135 typically.      PSH: appendectomy     PE:  A+Ox3, NAD, pleasant  Fluent speech  No jaundice  No resp distress    I reviewed his recent CT scan- it shows a central collection of necrosis- no great access points laterally to contemplate a VARD procedure.    A/P:  Recovering appropriately after necrotizing pancreatitis.  He seems to be doing well- on exocrine and endocrine support given the large  amount of pancreas that was affected.  He feels his recovery has stalled- but he is certainly stable and tolerating being at home with his drain.    We discussed the role of surgery- which for him would be a laparoscopic/open approach thru the abdomen. We discussed the risks to the mesenteric vessels, the stomach/colon/small bowel, the risk of wound healing issues, pancreatic fistula, and that he would likely have ~2 drains in place after this operation.      I agree that if gastric access to the collection can be obtained this would be the best option.  If that is unsuccessful- I think we would wait for 1-2 weeks and then revisit his recovery.  If he is worsening than we would plan for open necrosectomy, but if he continues to improve, than observation would be acceptable as well.    I will follow-up on the results of tomorrow's EGD and than proceed from there.    Total time spent: 35 minutes (counseling, image review, chart review, documentation)

## 2022-12-06 NOTE — NURSING NOTE
"Chief Complaint   Patient presents with     New Patient     Necrotizing pancreatitis       Vitals:    12/06/22 1245   Weight: 92.1 kg (203 lb)   Height: 1.803 m (5' 11\")       Body mass index is 28.31 kg/m .                          Anirudh Georges, EMT      "

## 2022-12-07 ENCOUNTER — PRE VISIT (OUTPATIENT)
Dept: SURGERY | Facility: CLINIC | Age: 62
End: 2022-12-07

## 2022-12-07 ENCOUNTER — VIRTUAL VISIT (OUTPATIENT)
Dept: SURGERY | Facility: CLINIC | Age: 62
End: 2022-12-07
Payer: COMMERCIAL

## 2022-12-07 DIAGNOSIS — K85.91 NECROTIZING PANCREATITIS: Primary | ICD-10-CM

## 2022-12-07 PROCEDURE — 99203 OFFICE O/P NEW LOW 30 MIN: CPT | Mod: 95 | Performed by: SURGERY

## 2022-12-07 NOTE — PATIENT INSTRUCTIONS
You met with Dr. Paulo Martinez.      Today's visit instructions:    Plan for your procedure as scheduled with the GI Team for now. They will contact us if they need intervention from Dr. Martinez.       If you have questions please contact Linn RN or Nuzhat RN during regular clinic hours, Monday through Friday 7:30 AM - 4:00 PM, or you can contact us via [employer] at anytime.       If you have urgent needs after-hours, weekends, or holidays please call the hospital at [phone] and ask to speak with our on-call General Surgery Team.    Appointment [phone]  Nurse Advice (Linn or Nuzhat): [phone]   Surgery Scheduler (Bharat or Eun): [phone]  Fax: [phone]

## 2022-12-07 NOTE — LETTER
12/7/2022       RE: Vernon Ortiz  [address] [place] [address]     Dear Colleague,    Thank you for referring your patient, Vernon Ortiz, to the [facility] [place] [facility]. Please see a copy of my visit note below.    I spoke with Vernon Ortiz today with his wife over [employer] regarding his necrotizing pancreatitis. He is about 3.5weeks into his course- unclear what caused the pancreatitis- though possibly trauma related.  He has an anterior abdominal drain currently.  He is at home after being an inpatient at [facility].  He is scheduled to undergo an EGD with attempt at EUS access of the collection.  He had a CT scan one week ago which showed stable size of the necrotic collection.    He thinks about 100ml a day comes from the drain (after subtracting the flushing volume).    He is doing ok.  Not at 100%, tires easily.  He denies any infectious symptoms. Tolerating a diet without nausea/vomiting.     Taking Creon  On a carb restricted diet  15u long acting insulin  3-4u insulin at Meal time  Glucose running 110-135 typically.      PSH: appendectomy     PE:  A+Ox3, NAD, pleasant  Fluent speech  No jaundice  No resp distress    I reviewed his recent CT scan- it shows a central collection of necrosis- no great access points laterally to contemplate a VARD procedure.    A/P:  Recovering appropriately after necrotizing pancreatitis.  He seems to be doing well- on exocrine and endocrine support given the large amount of pancreas that was affected.  He feels his recovery has stalled- but he is certainly stable and tolerating being at home with his drain.    We discussed the role of surgery- which for him would be a laparoscopic/open approach thru the abdomen. We discussed the risks to the mesenteric vessels, the stomach/colon/small bowel, the risk of wound healing issues, pancreatic fistula, and that he would likely have ~2 drains in  place after this operation.      I agree that if gastric access to the collection can be obtained this would be the best option.  If that is unsuccessful- I think we would wait for 1-2 weeks and then revisit his recovery.  If he is worsening than we would plan for open necrosectomy, but if he continues to improve, than observation would be acceptable as well.    I will follow-up on the results of tomorrow's EGD and than proceed from there.    Total time spent: 35 minutes (counseling, image review, chart review, documentation)        Sincerely,    Paulo Martinez MD

## 2022-12-08 ENCOUNTER — ANESTHESIA EVENT (OUTPATIENT)
Dept: SURGERY | Facility: CLINIC | Age: 62
End: 2022-12-08
Payer: COMMERCIAL

## 2022-12-08 ENCOUNTER — APPOINTMENT (OUTPATIENT)
Dept: GENERAL RADIOLOGY | Facility: CLINIC | Age: 62
End: 2022-12-08
Attending: INTERNAL MEDICINE
Payer: COMMERCIAL

## 2022-12-08 ENCOUNTER — HOSPITAL ENCOUNTER (OUTPATIENT)
Facility: CLINIC | Age: 62
Discharge: HOME | End: 2022-12-08
Attending: INTERNAL MEDICINE | Admitting: INTERNAL MEDICINE
Payer: COMMERCIAL

## 2022-12-08 ENCOUNTER — ANESTHESIA (OUTPATIENT)
Dept: SURGERY | Facility: CLINIC | Age: 62
End: 2022-12-08
Payer: COMMERCIAL

## 2022-12-08 VITALS
HEART RATE: 74 BPM | WEIGHT: 210.1 LBS | BODY MASS INDEX: 29.41 KG/M2 | RESPIRATION RATE: 18 BRPM | HEIGHT: 71 IN | SYSTOLIC BLOOD PRESSURE: 109 MMHG | DIASTOLIC BLOOD PRESSURE: 80 MMHG | TEMPERATURE: 97.9 F | OXYGEN SATURATION: 96 %

## 2022-12-08 LAB
ANION GAP SERPL CALCULATED.3IONS-SCNC: 12 MMOL/L (ref 7–15)
BUN SERPL-MCNC: 16.3 MG/DL (ref 8–23)
CALCIUM SERPL-MCNC: 9.5 MG/DL (ref 8.8–10.2)
CHLORIDE SERPL-SCNC: 103 MMOL/L (ref 98–107)
CREAT SERPL-MCNC: 0.89 MG/DL (ref 0.67–1.17)
DEPRECATED HCO3 PLAS-SCNC: 23 MMOL/L (ref 22–29)
ERYTHROCYTE [DISTWIDTH] IN BLOOD BY AUTOMATED COUNT: 13.1 % (ref 10–15)
GFR SERPL CREATININE-BSD FRML MDRD: >90 ML/MIN/1.73M2
GLUCOSE BLDC GLUCOMTR-MCNC: 129 MG/DL (ref 70–99)
GLUCOSE BLDC GLUCOMTR-MCNC: 93 MG/DL (ref 70–99)
GLUCOSE SERPL-MCNC: 118 MG/DL (ref 70–99)
HCT VFR BLD AUTO: 43.2 % (ref 40–53)
HGB BLD-MCNC: 14 G/DL (ref 13.3–17.7)
INR PPP: 1.02 (ref 0.85–1.15)
MCH RBC QN AUTO: 29.4 PG (ref 26.5–33)
MCHC RBC AUTO-ENTMCNC: 32.4 G/DL (ref 31.5–36.5)
MCV RBC AUTO: 91 FL (ref 78–100)
PLATELET # BLD AUTO: 212 10E3/UL (ref 150–450)
POTASSIUM SERPL-SCNC: 4.4 MMOL/L (ref 3.4–5.3)
RBC # BLD AUTO: 4.76 10E6/UL (ref 4.4–5.9)
SODIUM SERPL-SCNC: 138 MMOL/L (ref 136–145)
WBC # BLD AUTO: 6.4 10E3/UL (ref 4–11)

## 2022-12-08 PROCEDURE — 82310 ASSAY OF CALCIUM: CPT | Performed by: INTERNAL MEDICINE

## 2022-12-08 PROCEDURE — 999N000179 XR SURGERY CARM FLUORO LESS THAN 5 MIN W STILLS: Mod: TC

## 2022-12-08 PROCEDURE — 36415 COLL VENOUS BLD VENIPUNCTURE: CPT | Performed by: INTERNAL MEDICINE

## 2022-12-08 PROCEDURE — 85027 COMPLETE CBC AUTOMATED: CPT | Performed by: INTERNAL MEDICINE

## 2022-12-08 PROCEDURE — 360N000082 HC SURGERY LEVEL 2 W/ FLUORO, PER MIN: Performed by: INTERNAL MEDICINE

## 2022-12-08 PROCEDURE — 250N000009 HC RX 250: Performed by: NURSE ANESTHETIST, CERTIFIED REGISTERED

## 2022-12-08 PROCEDURE — 250N000011 HC RX IP 250 OP 636: Performed by: NURSE ANESTHETIST, CERTIFIED REGISTERED

## 2022-12-08 PROCEDURE — 258N000003 HC RX IP 258 OP 636: Performed by: NURSE ANESTHETIST, CERTIFIED REGISTERED

## 2022-12-08 PROCEDURE — 370N000017 HC ANESTHESIA TECHNICAL FEE, PER MIN: Performed by: INTERNAL MEDICINE

## 2022-12-08 PROCEDURE — 710N000009 HC RECOVERY PHASE 1, LEVEL 1, PER MIN: Performed by: INTERNAL MEDICINE

## 2022-12-08 PROCEDURE — 272N000001 HC OR GENERAL SUPPLY STERILE: Performed by: INTERNAL MEDICINE

## 2022-12-08 PROCEDURE — 999N000141 HC STATISTIC PRE-PROCEDURE NURSING ASSESSMENT: Performed by: INTERNAL MEDICINE

## 2022-12-08 PROCEDURE — 255N000002 HC RX 255 OP 636: Performed by: INTERNAL MEDICINE

## 2022-12-08 PROCEDURE — 82962 GLUCOSE BLOOD TEST: CPT

## 2022-12-08 PROCEDURE — 710N000012 HC RECOVERY PHASE 2, PER MINUTE: Performed by: INTERNAL MEDICINE

## 2022-12-08 PROCEDURE — 250N000025 HC SEVOFLURANE, PER MIN: Performed by: INTERNAL MEDICINE

## 2022-12-08 PROCEDURE — 85610 PROTHROMBIN TIME: CPT | Performed by: INTERNAL MEDICINE

## 2022-12-08 RX ORDER — ONDANSETRON 2 MG/ML
INJECTION INTRAMUSCULAR; INTRAVENOUS PRN
Status: DISCONTINUED | OUTPATIENT
Start: 2022-12-08 | End: 2022-12-08

## 2022-12-08 RX ORDER — ONDANSETRON 2 MG/ML
4 INJECTION INTRAMUSCULAR; INTRAVENOUS EVERY 6 HOURS PRN
Status: DISCONTINUED | OUTPATIENT
Start: 2022-12-08 | End: 2022-12-08 | Stop reason: HOSPADM

## 2022-12-08 RX ORDER — NALOXONE HYDROCHLORIDE 0.4 MG/ML
0.4 INJECTION, SOLUTION INTRAMUSCULAR; INTRAVENOUS; SUBCUTANEOUS
Status: DISCONTINUED | OUTPATIENT
Start: 2022-12-08 | End: 2022-12-08 | Stop reason: HOSPADM

## 2022-12-08 RX ORDER — SODIUM CHLORIDE, SODIUM LACTATE, POTASSIUM CHLORIDE, CALCIUM CHLORIDE 600; 310; 30; 20 MG/100ML; MG/100ML; MG/100ML; MG/100ML
INJECTION, SOLUTION INTRAVENOUS CONTINUOUS PRN
Status: DISCONTINUED | OUTPATIENT
Start: 2022-12-08 | End: 2022-12-08

## 2022-12-08 RX ORDER — SODIUM CHLORIDE, SODIUM LACTATE, POTASSIUM CHLORIDE, CALCIUM CHLORIDE 600; 310; 30; 20 MG/100ML; MG/100ML; MG/100ML; MG/100ML
INJECTION, SOLUTION INTRAVENOUS CONTINUOUS
Status: DISCONTINUED | OUTPATIENT
Start: 2022-12-08 | End: 2022-12-08 | Stop reason: HOSPADM

## 2022-12-08 RX ORDER — FLUMAZENIL 0.1 MG/ML
0.2 INJECTION, SOLUTION INTRAVENOUS
Status: DISCONTINUED | OUTPATIENT
Start: 2022-12-08 | End: 2022-12-08 | Stop reason: HOSPADM

## 2022-12-08 RX ORDER — FENTANYL CITRATE 50 UG/ML
25 INJECTION, SOLUTION INTRAMUSCULAR; INTRAVENOUS
Status: DISCONTINUED | OUTPATIENT
Start: 2022-12-08 | End: 2022-12-08 | Stop reason: HOSPADM

## 2022-12-08 RX ORDER — FENTANYL CITRATE 50 UG/ML
25 INJECTION, SOLUTION INTRAMUSCULAR; INTRAVENOUS EVERY 5 MIN PRN
Status: DISCONTINUED | OUTPATIENT
Start: 2022-12-08 | End: 2022-12-08 | Stop reason: HOSPADM

## 2022-12-08 RX ORDER — FENTANYL CITRATE 50 UG/ML
50 INJECTION, SOLUTION INTRAMUSCULAR; INTRAVENOUS EVERY 5 MIN PRN
Status: DISCONTINUED | OUTPATIENT
Start: 2022-12-08 | End: 2022-12-08 | Stop reason: HOSPADM

## 2022-12-08 RX ORDER — ONDANSETRON 4 MG/1
4 TABLET, ORALLY DISINTEGRATING ORAL EVERY 6 HOURS PRN
Status: DISCONTINUED | OUTPATIENT
Start: 2022-12-08 | End: 2022-12-08 | Stop reason: HOSPADM

## 2022-12-08 RX ORDER — IOPAMIDOL 510 MG/ML
INJECTION, SOLUTION INTRAVASCULAR PRN
Status: DISCONTINUED | OUTPATIENT
Start: 2022-12-08 | End: 2022-12-08 | Stop reason: HOSPADM

## 2022-12-08 RX ORDER — LIDOCAINE HYDROCHLORIDE 20 MG/ML
INJECTION, SOLUTION INFILTRATION; PERINEURAL PRN
Status: DISCONTINUED | OUTPATIENT
Start: 2022-12-08 | End: 2022-12-08

## 2022-12-08 RX ORDER — HYDROMORPHONE HYDROCHLORIDE 1 MG/ML
0.4 INJECTION, SOLUTION INTRAMUSCULAR; INTRAVENOUS; SUBCUTANEOUS EVERY 5 MIN PRN
Status: DISCONTINUED | OUTPATIENT
Start: 2022-12-08 | End: 2022-12-08 | Stop reason: HOSPADM

## 2022-12-08 RX ORDER — LEVOFLOXACIN 5 MG/ML
INJECTION, SOLUTION INTRAVENOUS PRN
Status: DISCONTINUED | OUTPATIENT
Start: 2022-12-08 | End: 2022-12-08

## 2022-12-08 RX ORDER — HYDROMORPHONE HYDROCHLORIDE 1 MG/ML
0.2 INJECTION, SOLUTION INTRAMUSCULAR; INTRAVENOUS; SUBCUTANEOUS EVERY 5 MIN PRN
Status: DISCONTINUED | OUTPATIENT
Start: 2022-12-08 | End: 2022-12-08 | Stop reason: HOSPADM

## 2022-12-08 RX ORDER — NALOXONE HYDROCHLORIDE 0.4 MG/ML
0.2 INJECTION, SOLUTION INTRAMUSCULAR; INTRAVENOUS; SUBCUTANEOUS
Status: DISCONTINUED | OUTPATIENT
Start: 2022-12-08 | End: 2022-12-08 | Stop reason: HOSPADM

## 2022-12-08 RX ORDER — PROPOFOL 10 MG/ML
INJECTION, EMULSION INTRAVENOUS PRN
Status: DISCONTINUED | OUTPATIENT
Start: 2022-12-08 | End: 2022-12-08

## 2022-12-08 RX ORDER — ONDANSETRON 2 MG/ML
4 INJECTION INTRAMUSCULAR; INTRAVENOUS EVERY 30 MIN PRN
Status: DISCONTINUED | OUTPATIENT
Start: 2022-12-08 | End: 2022-12-08 | Stop reason: HOSPADM

## 2022-12-08 RX ORDER — ONDANSETRON 4 MG/1
4 TABLET, ORALLY DISINTEGRATING ORAL EVERY 30 MIN PRN
Status: DISCONTINUED | OUTPATIENT
Start: 2022-12-08 | End: 2022-12-08 | Stop reason: HOSPADM

## 2022-12-08 RX ORDER — LIDOCAINE 40 MG/G
CREAM TOPICAL
Status: DISCONTINUED | OUTPATIENT
Start: 2022-12-08 | End: 2022-12-08 | Stop reason: HOSPADM

## 2022-12-08 RX ORDER — FENTANYL CITRATE 50 UG/ML
INJECTION, SOLUTION INTRAMUSCULAR; INTRAVENOUS PRN
Status: DISCONTINUED | OUTPATIENT
Start: 2022-12-08 | End: 2022-12-08

## 2022-12-08 RX ADMIN — LIDOCAINE HYDROCHLORIDE 100 MG: 20 INJECTION, SOLUTION INFILTRATION; PERINEURAL at 14:35

## 2022-12-08 RX ADMIN — SODIUM CHLORIDE, POTASSIUM CHLORIDE, SODIUM LACTATE AND CALCIUM CHLORIDE: 600; 310; 30; 20 INJECTION, SOLUTION INTRAVENOUS at 14:22

## 2022-12-08 RX ADMIN — LEVOFLOXACIN 500 MG: 5 INJECTION, SOLUTION INTRAVENOUS at 14:42

## 2022-12-08 RX ADMIN — PHENYLEPHRINE HYDROCHLORIDE 100 MCG: 10 INJECTION INTRAVENOUS at 14:52

## 2022-12-08 RX ADMIN — SUGAMMADEX 200 MG: 100 INJECTION, SOLUTION INTRAVENOUS at 15:34

## 2022-12-08 RX ADMIN — PROPOFOL 150 MG: 10 INJECTION, EMULSION INTRAVENOUS at 14:35

## 2022-12-08 RX ADMIN — MIDAZOLAM 2 MG: 1 INJECTION INTRAMUSCULAR; INTRAVENOUS at 14:22

## 2022-12-08 RX ADMIN — Medication 50 MG: at 14:36

## 2022-12-08 RX ADMIN — FENTANYL CITRATE 50 MCG: 50 INJECTION, SOLUTION INTRAMUSCULAR; INTRAVENOUS at 14:35

## 2022-12-08 RX ADMIN — ONDANSETRON 4 MG: 2 INJECTION INTRAMUSCULAR; INTRAVENOUS at 15:38

## 2022-12-08 RX ADMIN — PHENYLEPHRINE HYDROCHLORIDE 100 MCG: 10 INJECTION INTRAVENOUS at 14:35

## 2022-12-08 ASSESSMENT — ACTIVITIES OF DAILY LIVING (ADL)
ADLS_ACUITY_SCORE: 35

## 2022-12-08 ASSESSMENT — LIFESTYLE VARIABLES: TOBACCO_USE: 1

## 2022-12-08 NOTE — BRIEF OP NOTE
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  Specimens: * No specimens in log *  Findings:   Endoscopic exam initially showed small puddles of purulent material, however suction did not identify a fistula.  Contrast was injected into the cavity and EUS performed.  There was only a small 3-5 mm region of contact between the necrosis cavity and the gastric body wall. The cavity was filled with solid debris and no fluid. No target for drainage was apparent through the antrum.   Saline was injected into the cavity (10 ml x 2) to attempt to distend the cavity with fluid, however no visible fluid was seen. Fluoroscopy then showed contrast within the duodenal lumen.    Endoscopic examination via the EUS scope then showed purulent material draining at the region of the major papilla (within 5 mm of the biliary orifice).    Ultrasound in this region showed adjacent solid necrosis. The bile duct was visualized and followed to the liver. No stones were seen. There were peribiliary collateral veins.    At this point Dr. Santos and Dr. Martinez were called for intraoperative consultation to discuss whether to place plastic stents across this fistula. An Axios stent would risk biliary obstruction. Plastic stents would maintain patency of the fistula and perhaps speed resolution of the necrosis cavity via more aggressive irrigation through the perc drain, however this may still require several months and may potential decompress the cavity and interfere  with surgery if planned in the near future.    It was decide to not place stents across the fistula and recommend surgical management in the next few weeks as detailed in Dr. Martinez's note from yesterday.    Of note, no viable pancreatic parenchyma was visualized due to the shadowing from the solid necrosis.    Complications: None.  Implants: * No implants in log *      MANJULA Ellison MD  Professor of Medicine  Division of Gastroenterology, Hepatology and Nutrition  [facility]

## 2022-12-08 NOTE — ANESTHESIA PREPROCEDURE EVALUATION
Anesthesia Pre-Procedure Evaluation    Patient: Vernon Ortiz   MRN: [id number] : 1960        Procedure : Procedure(s):  ESOPHAGOGASTRODUODENOSCOPY, WITH ENDOSCOPIC ULTRASOUND, WITH ENDOSCOPIC EXCISION OF NECROTIC PANCREATIC TISSUE          No past medical history on file.   Past Surgical History:   Procedure Laterality Date     IR ABSCESS TUBE CHANGE  2022     IR PERITONEAL ABSCESS DRAINAGE  10/26/2022     IR SINOGRAM INJECTION DIAGNOSTIC  11/15/2022      No Known Allergies   Social History     Tobacco Use     Smoking status: Never     Smokeless tobacco: Never   Substance Use Topics     Alcohol use: Not on file      Wt Readings from Last 1 Encounters:   22 92.1 kg (203 lb)        Anesthesia Evaluation   Pt has had prior anesthetic. Type: General and MAC.    History of anesthetic complications       ROS/MED HX  ENT/Pulmonary:     (+) tobacco use, Current use,     Neurologic:       Cardiovascular:     (+) hypertension-----    METS/Exercise Tolerance:     Hematologic:       Musculoskeletal:       GI/Hepatic:       Renal/Genitourinary:       Endo:     (+) type II DM,     Psychiatric/Substance Use:       Infectious Disease:       Malignancy:       Other:            Physical Exam    Airway        Mallampati: II   TM distance: > 3 FB   Neck ROM: full   Mouth opening: < 3 cm    Respiratory Devices and Support         Dental  no notable dental history         Cardiovascular   cardiovascular exam normal          Pulmonary   pulmonary exam normal                OUTSIDE LABS:  CBC:   Lab Results   Component Value Date    WBC 12.5 (H) 2022    WBC 12.5 (H) 2022    HGB 12.9 (L) 2022    HGB 12.4 (L) 2022    HCT 40.0 2022    HCT 38.1 (L) 2022     (H) 2022     (H) 2022     BMP:   Lab Results   Component Value Date     (L) 2022     (L) 2022    POTASSIUM 4.2 2022    POTASSIUM 4.3 2022    CHLORIDE 100 2022     CHLORIDE 100 11/06/2022    CO2 21 (L) 11/07/2022    CO2 23 11/06/2022    BUN 14.6 11/07/2022    BUN 17.1 11/06/2022    CR 0.77 11/07/2022    CR 0.75 11/06/2022     (H) 11/07/2022     (H) 11/07/2022     COAGS: No results found for: PTT, INR, FIBR  POC: No results found for: BGM, HCG, HCGS  HEPATIC:   Lab Results   Component Value Date    ALBUMIN 2.8 (L) 10/29/2022    PROTTOTAL 5.7 (L) 10/29/2022    ALT 22 10/29/2022    AST 25 10/29/2022    ALKPHOS 64 10/29/2022    BILITOTAL 1.0 10/29/2022     OTHER:   Lab Results   Component Value Date    A1C 5.6 10/27/2022    DOLORES 8.1 (L) 11/07/2022    PHOS 3.1 11/07/2022    MAG 1.9 10/30/2022    .00 (H) 11/03/2022       Anesthesia Plan    ASA Status:  2      Anesthesia Type: General.     - Airway: ETT   Induction: Intravenous, Propofol.   Maintenance: Balanced.   Techniques and Equipment:     - Airway: Video-Laryngoscope     - Lines/Monitors: 2nd IV     Consents    Anesthesia Plan(s) and associated risks, benefits, and realistic alternatives discussed. Questions answered and patient/representative(s) expressed understanding.     - Discussed: Risks, Benefits and Alternatives for the PROCEDURE were discussed     - Discussed with:  Patient      - Extended Intubation/Ventilatory Support Discussed: Yes.      - Patient is DNR/DNI Status: No    Use of blood products discussed: No .     Postoperative Care    Pain management: IV analgesics, Multi-modal analgesia.   PONV prophylaxis: Ondansetron (or other 5HT-3), Dexamethasone or Solumedrol     Comments:                Fidel Solitario MD

## 2022-12-08 NOTE — DISCHARGE INSTRUCTIONS
[facility]  Same-Day Surgery   Adult Discharge Orders & Instructions     For 24 hours after surgery    Get plenty of rest.  A responsible adult must stay with you for at least 24 hours after you leave the hospital.   Do not drive or use heavy equipment.  If you have weakness or tingling, don't drive or use heavy equipment until this feeling goes away.  Do not drink alcohol.  Avoid strenuous or risky activities.  Ask for help when climbing stairs.   You may feel lightheaded.  IF so, sit for a few minutes before standing.  Have someone help you get up.   If you have nausea (feel sick to your stomach): Drink only clear liquids such as apple juice, ginger ale, broth or 7-Up.  Rest may also help.  Be sure to drink enough fluids.  Move to a regular diet as you feel able.  You may have a slight fever. Call the doctor if your fever is over 100 F (37.7 C) (taken under the tongue) or lasts longer than 24 hours.  You may have a dry mouth, a sore throat, muscle aches or trouble sleeping.  These should go away after 24 hours.  Do not make important or legal decisions.   Call your doctor for any of the followin.  Signs of infection (fever, growing tenderness at the surgery site, a large amount of drainage or bleeding, severe pain, foul-smelling drainage, redness, swelling).    2. It has been over 8 to 10 hours since surgery and you are still not able to urinate (pass water).    3.  Headache for over 24 hours.    To contact a doctor, call Dr Ellison at the  GI clinic at [phone] or:    '   [phone] and ask for the resident on call for   Gastroenterology (answered 24 hours a day)  '   Emergency Department:    [facility]: [phone]       (TTY for hearing impaired: [phone])    [facility]: [phone]       (TTY for hearing impaired: [phone])   [facility]      Same-Day EGD Procedure  Adult Discharge Orders &  Instructions     You had a procedure known as an Esophagogastroduodenoscopy (EGD) of either the upper gastrointestinal (GI) tract. An UPPER EGD will place a camera into either your mouth or nose to examine your esophagus, stomach, and/or small intestines. Biopsies, small samples of tissue, are often taken to help diagnose and/or classify stages of disease growth. The EGD is also used to help locate areas of the GI tract that may require further treatment (dilation, stenting, clipping, removal, etc.) or medical interventions (medication specific).      After your procedure   Make sure to clarify with your healthcare provider any diet restrictions (For example, clear liquid, low fat, no caffeine, etc.)   Do NOT take aspirin containing medications or any other blood-thinning medicines (anticoagulants) until your healthcare provider says it's OK.   You MAY be prescribed antibiotics, depending on what was done and/or found during your EUS, make sure to take antibiotics as prescribed by your healthcare provider    For 24 hours after surgery  Get plenty of rest.  A responsible adult must stay with you for at least 24 hours after you leave the hospital.   Do not drive or use heavy equipment.  If you have weakness or tingling, don't drive or use heavy equipment until this feeling goes away.  Do not drink alcohol.  Avoid strenuous or risky activities (gym, yoga, cycling, etc.).  Ask for help when climbing stairs.   You may feel lightheaded.  IF so, sit for a few minutes before standing.  Have someone help you get up.   If you have nausea (feel sick to your stomach): Drink only clear liquids such as apple juice, ginger ale, broth or 7-Up.  Rest may also help.  Be sure to drink enough fluids.  Move to a regular diet as you feel able.  If you feel bloated or have too much gas, use a heating pad on your belly to help reduce the discomfort. This should help you feel better.   You may have a slight fever. This is normal for the  first 24 hours.   You may have a dry mouth, a sore throat, muscle aches or trouble sleeping.  These are normal and will go away after 24 hours. A sore throat is most common. Use lozenges or gargle with salt water to ease the discomfort.   Do not make important or legal decisions.      Call your doctor for any of the following:  Chest pain, and/or shortness of breath  Abdominal  pain, bloating or cramping that has not improved or does not respond to pain reliving medications (Tylenol or narcotics if prescribed)   Difficulty swallowing or feeling as though food or liquids are stuck in your throat   Sore throat lasting more than 2 days or pain that has worsened over time   Black or tarry stools   Nausea and/or vomiting that is not resolving or has not responded to anti-nausea medications prescribed to you   It has been over 8 to 10 hours since surgery and you are still not able to urinate (pass water)   Headache for over 24 hours   Fever over 100.5 F (38 C) lasting more than 24 hours after the procedure   Signs of jaundice or blockage (fever, chills, abdominal pain, yellowing of the whites of your eyes, yellowing of your skin, and/or passing darker than normal urine)     To contact a doctor, call:   [ ] ___________ (Monday thru Friday 8:00am to 4:30pm)   [ ] [phone] and ask for the ___________________ resident on call (answered 24 hours a day)   [ ] Emergency Department: [facility]: [phone]   Take it easy when you get home.  Remember, same day surgery DOES NOT MEAN SAME DAY RECOVERY!  Healing is a gradual process.  You will need some time to recover - you may be more tired than you realize at first.  Rest and relax for at least the first 24 hours at home.  You'll feel better and heal faster if you take good care of yourself.

## 2022-12-08 NOTE — ANESTHESIA PROCEDURE NOTES
Airway       Patient location during procedure: OR       Procedure Start/Stop Times: 12/8/2022 2:39 PM  Staff -        CRNA: Jessi Contreras APRN CRNA       Performed By: CRNAIndications and Patient Condition       Indications for airway management: ángel-procedural       Induction type:intravenous       Mask difficulty assessment: 1 - vent by mask    Final Airway Details       Final airway type: endotracheal airway       Successful airway: ETT - single  Endotracheal Airway Details        ETT size (mm): 8.0       Cuffed: yes       Successful intubation technique: direct laryngoscopy       DL Blade Type: MAC 4       Grade View of Cords: 1       Adjucts: stylet       Position: Right       Measured from: lips       Secured at (cm): 22       Bite block used: None    Post intubation assessment        Placement verified by: capnometry, equal breath sounds and chest rise        Number of attempts at approach: 1       Secured with: pink tape       Ease of procedure: easy       Dentition: Intact and Unchanged    Medication(s) Administered   Medication Administration Time: 12/8/2022 2:39 PM

## 2022-12-08 NOTE — ANESTHESIA CARE TRANSFER NOTE
Patient: Vernon Ortiz    Procedure: Procedure(s):  ESOPHAGOGASTRODUODENOSCOPY, WITH ENDOSCOPIC ULTRASOUND,       Diagnosis: Necrotizing pancreatitis [K85.91]  Diagnosis Additional Information: No value filed.    Anesthesia Type:   General     Note:    Oropharynx: oropharynx clear of all foreign objects and spontaneously breathing  Level of Consciousness: awake  Oxygen Supplementation: nasal cannula  Level of Supplemental Oxygen (L/min / FiO2): 2  Independent Airway: airway patency satisfactory and stable  Dentition: dentition unchanged  Vital Signs Stable: post-procedure vital signs reviewed and stable  Report to RN Given: handoff report given  Patient transferred to: PACU    Handoff Report: Identifed the Patient, Identified the Reponsible Provider, Reviewed the pertinent medical history, Discussed the surgical course, Reviewed Intra-OP anesthesia mangement and issues during anesthesia, Set expectations for post-procedure period and Allowed opportunity for questions and acknowledgement of understanding      Vitals:  Vitals Value Taken Time   /77 12/08/22 1551   Temp     Pulse 70 12/08/22 1557   Resp 14 12/08/22 1557   SpO2 95 % 12/08/22 1557   Vitals shown include unvalidated device data.    Electronically Signed By: CHRISTIAN Alfaro CRNA  December 8, 2022  3:58 PM

## 2022-12-08 NOTE — ANESTHESIA POSTPROCEDURE EVALUATION
Patient: Vernon Ortiz    Procedure: Procedure(s):  ESOPHAGOGASTRODUODENOSCOPY, WITH ENDOSCOPIC ULTRASOUND,       Anesthesia Type:  General    Note:  Disposition: Outpatient   Postop Pain Control: Uneventful            Sign Out: Well controlled pain   PONV: No   Neuro/Psych: Uneventful            Sign Out: Acceptable/Baseline neuro status   Airway/Respiratory: Uneventful            Sign Out: Acceptable/Baseline resp. status   CV/Hemodynamics: Uneventful            Sign Out: Acceptable CV status; No obvious hypovolemia; No obvious fluid overload   Other NRE: NONE   DID A NON-ROUTINE EVENT OCCUR? No           Last vitals:  Vitals Value Taken Time   /80 12/08/22 1600   Temp 36.3  C (97.4  F) 12/08/22 1551   Pulse 63 12/08/22 1615   Resp 12 12/08/22 1615   SpO2 97 % 12/08/22 1615   Vitals shown include unvalidated device data.    Electronically Signed By: Fidel Solitario MD  December 8, 2022  4:16 PM

## 2022-12-09 LAB — UPPER EUS: NORMAL

## 2022-12-16 ENCOUNTER — VIRTUAL VISIT (OUTPATIENT)
Dept: SURGERY | Facility: CLINIC | Age: 62
End: 2022-12-16
Payer: COMMERCIAL

## 2022-12-16 VITALS — WEIGHT: 203 LBS | BODY MASS INDEX: 28.42 KG/M2 | HEIGHT: 71 IN

## 2022-12-16 DIAGNOSIS — K85.91 NECROTIZING PANCREATITIS: Primary | ICD-10-CM

## 2022-12-16 PROCEDURE — 99212 OFFICE O/P EST SF 10 MIN: CPT | Mod: 95 | Performed by: SURGERY

## 2022-12-16 RX ORDER — CEFAZOLIN SODIUM 2 G/50ML
2 SOLUTION INTRAVENOUS
Status: CANCELLED | OUTPATIENT
Start: 2022-12-16

## 2022-12-16 RX ORDER — CEFAZOLIN SODIUM 2 G/50ML
2 SOLUTION INTRAVENOUS SEE ADMIN INSTRUCTIONS
Status: CANCELLED | OUTPATIENT
Start: 2022-12-16

## 2022-12-16 ASSESSMENT — PAIN SCALES - GENERAL: PAINLEVEL: NO PAIN (0)

## 2022-12-16 NOTE — NURSING NOTE
"Chief Complaint   Patient presents with     RECHECK     Follow up to discuss surgery       Vitals:    12/16/22 1333   Weight: 92.1 kg (203 lb)   Height: 1.803 m (5' 11\")       Body mass index is 28.31 kg/m .                          Anirudh Georges, EMT    "

## 2022-12-16 NOTE — PROGRESS NOTES
Vernon is a 62 year old who is being evaluated via a billable telephone visit.      What phone number would you like to be contacted at? [phone]  How would you like to obtain your AVS? James    Distant Location (provider location):  Off-site  Phone call duration: 15 minutes    During this telephone visit the patient is located in [place], patient verifies this as the location during the entirety of this visit.       I spoke with Vernon Ortiz over the phone to discuss his recent EGD with attempt at accessing his necrotic pancreatic collection (unable to access endoscopically).    He has been dealing with necrotizing pancreatitis for about 1 month (possibly due to trauma). He is newly diabetic and using insulin, requiring exocrine support now as well.    Since I last spoke to him he is mostly stable.  Did have a fever the night of the EGD procedure, but otherwise without systemic signs of illness.  Tolerating a diet.  He endorses fatigue and a general malaise.  Minimal pain in his abdomen.     PE:  A+Ox3, NAD, pleasant  Fluent speech  No resp distress    His anterior drain still with about 100ml of fluid a day.  He is flushing the drain as well.    We rediscussed his situation.  I feel it would be reasonable to either continue observation vs offer surgical debridement.  We discussed the risks/benefits of the procedure- focusing on bleeding, fistula, injury to colon, small bowel, stomach, need for additional procedures, pancreatic fistula, wound infection.      After answering his questions, he would like to proceed with surgery.  We will work to schedule this now.

## 2022-12-16 NOTE — LETTER
12/16/2022       RE: Vernon Ortiz  [address] [place] [address]     Dear Colleague,    Thank you for referring your patient, Vernon Ortiz, to the [facility] [place] at [facility]. Please see a copy of my visit note below.    Vernon is a 62 year old who is being evaluated via a billable telephone visit.      What phone number would you like to be contacted at? [phone]  How would you like to obtain your AVS? MyChart    Distant Location (provider location):  Off-site  Phone call duration: 15 minutes    During this telephone visit the patient is located in [place], patient verifies this as the location during the entirety of this visit.       I spoke with Vernon Ortiz over the phone to discuss his recent EGD with attempt at accessing his necrotic pancreatic collection (unable to access endoscopically).    He has been dealing with necrotizing pancreatitis for about 1 month (possibly due to trauma). He is newly diabetic and using insulin, requiring exocrine support now as well.    Since I last spoke to him he is mostly stable.  Did have a fever the night of the EGD procedure, but otherwise without systemic signs of illness.  Tolerating a diet.  He endorses fatigue and a general malaise.  Minimal pain in his abdomen.     PE:  A+Ox3, NAD, pleasant  Fluent speech  No resp distress    His anterior drain still with about 100ml of fluid a day.  He is flushing the drain as well.    We rediscussed his situation.  I feel it would be reasonable to either continue observation vs offer surgical debridement.  We discussed the risks/benefits of the procedure- focusing on bleeding, fistula, injury to colon, small bowel, stomach, need for additional procedures, pancreatic fistula, wound infection.      After answering his questions, he would like to proceed with surgery.  We will work to schedule this now.          Again, thank you for allowing me to  participate in the care of your patient.      Sincerely,    Paulo Martinez MD

## 2022-12-16 NOTE — PATIENT INSTRUCTIONS
You met with Dr. Paulo Martinez.      Today's visit instructions:    Reminder: Surgery Requirements  Your surgery will be at [address] [place] [address]  You will need to arrive 2 hours early.  You will need someone to drive you home (over 18 years old) and stay with you for 24 hours after the procedure.  You will need a preop physical with PAC (pre-anesthesia care) within 30 days of surgery- closer is always better. You can do this virtually.  Stop any blood thinners, vitamins, minerals, or herbal supplements 5 days before surgery.  If you are taking a prescribed blood thinner please let us know for specific instructions.  Fasting- a nurse from [facility] will call you 1-2 days before surgery to confirm your procedure and tell you when to stop eating and drinking.   Wash with the soap (Antibacterial, Dial Complete Foam, Hibiclense, or soap given/mailed from the clinic) the night before surgery and morning of surgery. See instructions in the Surgery Packet.  If you would like a procedure estimate please call Cost of Care at [phone].       If you have questions please contact Linn RN or Nuzhat RN during regular clinic hours, Monday through Friday 7:30 AM - 4:00 PM, or you can contact us via [employer] at anytime.       If you have urgent needs after-hours, weekends, or holidays please call the hospital at [phone] and ask to speak with our on-call General Surgery Team.    Appointment [phone]  Nurse Advice (Linn or Nuzhat): [phone]   Surgery Scheduler (Eun): [phone]  Fax: [phone]    After Your Laparoscopic Pancreas Necrosectomy     Incision care   You may take a shower the day after surgery. Carefully wash your incision with soap and water. Do not submerge yourself in water (bath, whirlpool, hot tub, pool, lake) for 14 days after surgery.   Remove the bandage the day after surgery, but leave the medical tape (Steri-Strips) or glue in place. These will loosen and fall off on their  own 1-2 weeks after surgery.     Always wash your hands before touching your incisions or removing bandages.   It is not unusual to form a collection of fluid or blood under your incision that may feel firm or squishy- it can take several weeks to months for your body to reabsorb it.  At times, it may even drain.  If that should happen keep the area clean with soap, water,  and cover with a clean gauze dressing. You can change this daily or as needed.     Other medicines   Wait to start aspirin or blood thinners until the day after surgery. You can continue your regular medicines at your normal time the day after surgery.    Your pain medicine may cause constipation (hard, dry stools). To help with this, take the stool softener your doctor gave you or an over-the-counter stool softener or laxative. You can stop taking this when you are no longer taking pain medicine and your bowel movements are back to normal.      For pain or discomfort  Take the narcotic pain medicine your doctor gave you as needed and as instructed on the bottle. If you prefer to use over-the-counter medication, use acetaminophen (Tylenol) or ibuprofen (Advil, Motrin) as instructed on the box. Do not take Tylenol if it is in your narcotic pain medication.    Use an ice pack on your abdomen (belly) for 20 minutes at a time as needed for the first 24 hours. Be sure to protect your skin by putting a cloth between the ice pack and your skin.   After 24 hours you can switch to heat for 20 minutes as needed. Be sure to protect your skin by putting a cloth between the heat pack and your skin.    You may experience right shoulder pain after surgery which will go away 1-4 days after your procedure.  This is related to the gas that was used to inflate your abdomen, it gets trapped between your liver and diaphragm. Please walk frequently and apply a heating pad to the area protecting your skin with a barrier such as a towel.       Activities   No driving  until you feel it s safe to do so. Don t drive while taking narcotic pain medicine.   Don t lift anything heavier than 20 pounds for 3 to 4 weeks after surgery.      Special equipment   Wear your abdominal binder for the first 30 days after surgery. You don t have to wear it when you re sleeping. You can wear it longer than 30 days if you wish.        Diet   You can eat your regular meals after surgery.     When to call the doctor   Call your doctor if you have:   A fever above 101 F (38.3 C) (taken under the tongue), or a fever or chills lasting more than a day.   Redness at the incision site.   Any fluid or blood draining from the incision, especially if it smells bad. Severe pain that doesn t improve with pain medicine.      We will call you 2 to 4 days after surgery to review this handout, answer questions and help arrange after-surgery care. If you have questions or concerns, please call [phone] during regular office hours. If you need to call after business hours, call [phone] and ask to page the surgeon on-call.            Transversus Abdominis Plane (TAP) Pain Block      What is a TAP block?   A TAP block can help you manage your pain after surgery. TAP stands for transversus abdominis plane, which is a muscle layer in your abdomen (belly). The TAP block uses numbing medicine similar to Novocaine to block pain near the site of your surgery.       Why get a TAP block?   To better manage your pain after surgery. A tap block will help keep the pain from getting severe and out of control.   To block pain signals from the nerve, which helps decrease pain after surgery.   To help you sleep, easily breathe deeply, walk and visit with others.      How is it done?   You will lie still on a table. We will use an ultrasound machine to help us see the correct muscle layer of your abdomen. Then, we ll use a needle to inject the medicine. We may also give you some sleep medicine to lessen the pain of the  injection.       The procedure takes between 5 and 15 minutes. It is usually done right before surgery, but will sometimes be after. It depends on your surgery and care needs.      What can I expect?   You may feel numbness, tingling or a heaviness in your abdomen.    You may have pain control up to 72 hours after surgery.   The TAP block may not lessen all of your surgery pain. But most patients feel 50 to 75 percent less pain than without the block.       Tell your nurse if you have:   Numbness or tingling in areas other than where the injection was   Blurry vision   Ringing in your ears   A metallic taste in your mouth

## 2022-12-20 ENCOUNTER — TELEPHONE (OUTPATIENT)
Dept: SURGERY | Facility: CLINIC | Age: 62
End: 2022-12-20

## 2022-12-20 NOTE — TELEPHONE ENCOUNTER
Patient is scheduled for surgery with Dr. Martinez    Spoke with: Vernon    Date of Surgery: 1/5/2023    Location: [facility]    Informed patient they will need an adult  Yes    Pre op with Provider n/a    H&P: Scheduled with PAC on 12/28 at 8:30am via video     Pre-procedure COVID-19 Test: n/a - Patient is scheduled after 12/6    Additional imaging/appointments: n/a    Surgery packet: To be sent via [email or web address]     Additional comments: n/a

## 2022-12-21 NOTE — TELEPHONE ENCOUNTER
FUTURE VISIT INFORMATION      SURGERY INFORMATION:    Date: 1/5/23    [facility]    Surgeon:  Paulo Martinez MD    Anesthesia Type:  general    Procedure: LAPAROSCOPY, DIAGNOSTIC, BY GENERAL SURGERY, PANCREAS NECROSECTOMY    RECORDS REQUESTED FROM:       Primary Care Provider: Hardeep Dawson MD-  [facility]    Most recent EKG+ Tracing: 10/26/22

## 2022-12-28 ENCOUNTER — PRE VISIT (OUTPATIENT)
Dept: SURGERY | Facility: CLINIC | Age: 62
End: 2022-12-28

## 2022-12-28 ENCOUNTER — ANESTHESIA EVENT (OUTPATIENT)
Dept: SURGERY | Facility: CLINIC | Age: 62
End: 2022-12-28
Payer: COMMERCIAL

## 2022-12-28 ENCOUNTER — VIRTUAL VISIT (OUTPATIENT)
Dept: SURGERY | Facility: CLINIC | Age: 62
End: 2022-12-28
Payer: COMMERCIAL

## 2022-12-28 DIAGNOSIS — K85.91 NECROTIZING PANCREATITIS: Primary | ICD-10-CM

## 2022-12-28 PROCEDURE — 99215 OFFICE O/P EST HI 40 MIN: CPT | Mod: 95 | Performed by: PHYSICIAN ASSISTANT

## 2022-12-28 ASSESSMENT — LIFESTYLE VARIABLES: TOBACCO_USE: 1

## 2022-12-28 ASSESSMENT — ENCOUNTER SYMPTOMS: SEIZURES: 0

## 2022-12-28 ASSESSMENT — PAIN SCALES - GENERAL: PAINLEVEL: NO PAIN (0)

## 2022-12-28 NOTE — PATIENT INSTRUCTIONS
Preparing for Your Surgery      Name:  Vernon Ortiz   MRN:  [id number]   :  1960   Today's Date:  2022       Arriving for surgery:  Surgery date:  23  Arrival time:  9:15 am     Surgeries and procedures: Adult patients can have 2 visitors all through the surgery process.     Visiting hours: 8 a.m. to 8:30 p.m.     Hospital: Adult patients and children under age 18 can have 4 visitor at a time     No visitors under the age of 5 are allowed for hospital patients.  Double occupancy rooms: Patients can have only two visitors at a time.     Patients with disabilities: Can have a support person with them (family member, service provider     Or someone well informed about their needs) plus the allowed number of visitors     Patients confirmed or suspected to have symptoms of COVID 19 or flu:     No visitors allowed for adult patients.   Children (under age 18) can have 1 named visitor.     People who are sick or showing symptoms of COVID 19 or flu:    Are not allowed to visit patients--we can only make exceptions in special situations.       Please follow these guidelines for your visit:   Arrive wearing a mask over your mouth and nose; we will give you a medical mask to wear    If you arrive wearing a cloth mask.   Keep it on during your entire visit, even when in patient's room.   If you don't wear a mask we'll ask you to leave.     Clean your hands with alcohol hand . Do this when you arrive at and leave the building and patient room,    And again after you touch your mask or anything in the room.     You can t visit if you have a fever, cough, shortness of breath, muscle aches, headaches, sore throat    Or diarrhea      Stay 6 feet away from others during your visit and between visits     Go directly to and from the room you are visiting.     Stay in the patient s room during your visit. Limit going to other places in the hospital as much as possible     Leave bags and jackets at home or in  the car.     For everyone s health, please don t come and go during your visit. That includes for smoking   during your visit.     Please come to:     [facility] [address]  [address] [place]  [address]    -   parking is available in front of the hospital for those with mobility difficulties.     -  Parking is also available at the Patient Visitor Ramp on [address] and [facility].    -  When entering the hospital, you will be asked some Covid screening questions and directed to Patient Registration. Patient Registration will then direct you to the 3rd floor Surgery Waiting Room. [phone]?     - ?If you are in need of directions, wheelchair or escort please stop at the Information Desk in the lobby.  Inform the information person that you are here for surgery; a wheelchair and escort to [address] will be provided.?     What can I eat or drink?  -  You may eat and drink normally up to 8 hours prior to arrival time. (Until 1:15 am)  -  You may have clear liquids until 2 hours prior to arrival time. (Until 7:15 am)    Examples of clear liquids:  Water  Clear broth  Juices (apple, white grape, white cranberry  and cider) without pulp  Noncarbonated, powder based beverages  (lemonade and Reynaldo-Aid)  Sodas (Sprite, 7-Up, ginger ale and seltzer)  Coffee or tea (without milk or cream)  Gatorade    -  No Alcohol for at least 24 hours before surgery.     Which medicines can I take?  Hold Aspirin for 7 days before surgery.   Hold Multivitamins for 7 days before surgery.  Hold Supplements for 7 days before surgery.  Hold Ibuprofen (Advil, Motrin) for 1 day before surgery--unless otherwise directed by surgeon.  Hold Naproxen (Aleve) for 4 days before surgery.     The evening prior to surgery (on 1-4-23), decrease the Glargine (Lantus) insulin to 12 units.    -  DO NOT take these medications the day of surgery:  Aspart insulin, Amylase-Lipase-Protease  (Hieu),     -  PLEASE TAKE these medications the day of surgery:  Levothyroxine,   Acetaminophen (Tylenol) if needed    How do I prepare myself?  - Please take 2 showers before surgery using Scrubcare or Hibiclens soap.    Use this soap only from the neck to your toes.     Leave the soap on your skin for one minute--then rinse thoroughly.      You may use your own shampoo and conditioner. No other hair products.   - Please remove all jewelry and body piercings.  - No lotions, deodorants or fragrance.  - Bring your ID and insurance card.    -If you have a Deep Brain Stimulator, Spinal Cord Stimulator, or any Neuro Stimulator device---you must bring the remote control to the hospital.      ALL PATIENTS GOING HOME THE SAME DAY OF SURGERY ARE REQUIRED TO HAVE A RESPONSIBLE ADULT TO DRIVE AND BE IN ATTENDANCE WITH THEM FOR 24 HOURS FOLLOWING SURGERY.    Covid testing policy as of 12/06/2022  Your surgeon will notify and schedule you for a COVID test if one is needed before surgery--please direct any questions or COVID symptoms to your surgeon      Questions or Concerns:    - For any questions regarding the day of surgery or your hospital stay, please contact the Pre Admission Nursing Office at [phone].       - If you have health changes between today and your surgery, please call your surgeon.       - For questions after surgery, please call your surgeons office.

## 2022-12-28 NOTE — H&P
Pre-Operative H & P     CC:  Preoperative exam to assess for increased cardiopulmonary risk while undergoing surgery and anesthesia.    Date of Encounter: 12/28/2022  Primary Care Physician:  Hardeep Dawson     Reason for visit:   Encounter Diagnosis   Name Primary?     Necrotizing pancreatitis        HPI  Vernon Ortiz is a 62 year old male who presents for pre-operative H & P in preparation for  Procedure Information     Case: [id number] Date/Time: 01/05/23 1115    Procedure: LAPAROSCOPY, DIAGNOSTIC, BY GENERAL SURGERY, PANCREAS NECROSECTOMY (Abdomen)    Anesthesia type: General    Diagnosis: Necrotizing pancreatitis [K85.91]    Pre-op diagnosis: Necrotizing pancreatitis [K85.91]    [facility]    Providers: Paulo Martinez MD          Patient is being evaluated for comorbid conditions of IDDM, HLD, hypothyroidism.    Mr. Ortiz has been dealing with necrotizing pancreatitis for approximately two months (possibly due to trauma after falling in a boat). He is newly diabetic following the injury and using insulin, requiring exocrine support now as well. He underwent an EGD on 12/8/22 with attempt at accessing his necrotic pancreatic collection (unable to access endoscopically). He now presents for the above procedure.     History was obtained from patient & chart review.     Hx of abnormal bleeding or anti-platelet use: denies      Past Medical History  Past Medical History:   Diagnosis Date     Diabetes mellitus (H)      HLD (hyperlipidemia)      Hypothyroidism      Necrotizing pancreatitis        Past Surgical History  Past Surgical History:   Procedure Laterality Date     APPENDECTOMY       ENDOSCOPIC ULTRASOUND UPPER GASTROINTESTINAL TRACT (GI) N/A 12/08/2022    Procedure: ESOPHAGOGASTRODUODENOSCOPY, WITH ENDOSCOPIC ULTRASOUND,;  Surgeon: Krish Ellison MD;  Location: [facility]     IR ABSCESS TUBE CHANGE  11/01/2022     IR PERITONEAL ABSCESS DRAINAGE  10/26/2022     IR SINOGRAM  INJECTION DIAGNOSTIC  11/15/2022       Prior to Admission Medications  Current Outpatient Medications   Medication Sig Dispense Refill     acetaminophen (TYLENOL) 500 MG tablet Take 500-1,000 mg by mouth every 6 hours as needed for mild pain       amylase-lipase-protease (CREON 24) 27228-99793 units CPEP per EC capsule Take 4 capsules by mouth 3 times daily (with meals) 360 capsule 0     insulin aspart (NOVOLOG PEN) 100 UNIT/ML pen Inject 1 Units Subcutaneous 3 times daily (with meals) (Patient taking differently: Inject Subcutaneous 3 times daily (with meals) 1 unit per 15 carb) 15 mL 0     insulin glargine (LANTUS PEN) 100 UNIT/ML pen Inject 50 Units Subcutaneous every evening (Patient taking differently: Inject 15 Units Subcutaneous every evening Takes at 4pm) 60 mL 0     levothyroxine (SYNTHROID/LEVOTHROID) 100 MCG tablet Take 100 mcg by mouth every morning       simvastatin (ZOCOR) 40 MG tablet Take 40 mg by mouth At Bedtime       amylase-lipase-protease (CREON 24) 86208-35633 units CPEP per EC capsule Take 2 capsules by mouth Take with snacks or supplements (pancreatic insufficiency) 60 capsule 0     blood glucose (NO BRAND SPECIFIED) test strip Use to test blood sugar with meals and nightly or as directed. 500 strip 1     blood glucose monitoring (ACCU-CHEK MULTICLIX) lancets Use to test blood sugar with meals and nightly or as directed. 204 each 1     blood glucose monitoring (NO BRAND SPECIFIED) meter device kit Use to test blood sugar with meals and nightly or as directed. 1 kit 0     insulin aspart (NOVOLOG PEN) 100 UNIT/ML pen Inject 1 Units Subcutaneous Take with snacks or supplements for high blood sugar 15 mL 0     insulin aspart (NOVOLOG PEN) 100 UNIT/ML pen Inject 1-10 Units Subcutaneous 3 times daily (before meals) 60 mL 0     insulin aspart (NOVOLOG PEN) 100 UNIT/ML pen Inject 1-7 Units Subcutaneous At Bedtime 15 mL 0     insulin pen needle (32G X 4 MM) 32G X 4 MM miscellaneous Use pen needles  daily or as directed. 90 each 1     Sharps Container MISC 1 Box 4 times daily (with meals and nightly) 1 each 0     sodium chloride, PF, 0.9% PF flush Irrigate with 10 mLs as directed every 12 hours 500 mL 0       Allergies  No Known Allergies    Social History  Social History     Socioeconomic History     Marital status:      Spouse name: Not on file     Number of children: Not on file     Years of education: Not on file     Highest education level: Not on file   Occupational History     Not on file   Tobacco Use     Smoking status: Former     Years: 9.00     Types: Cigarettes     Smokeless tobacco: Never   Substance and Sexual Activity     Alcohol use: Never     Drug use: Never     Sexual activity: Not on file   Other Topics Concern     Not on file   Social History Narrative     Not on file     Social Determinants of Health     Financial Resource Strain: Not on file   Food Insecurity: Not on file   Transportation Needs: Not on file   Physical Activity: Not on file   Stress: Not on file   Social Connections: Not on file   Intimate Partner Violence: Not on file   Housing Stability: Not on file       Family History  Family History   Problem Relation Age of Onset     Deep Vein Thrombosis (DVT) Father      Anesthesia Reaction No family hx of      Cardiovascular No family hx of        Review of Systems  The complete review of systems is negative other than noted in the HPI or here.     Anesthesia Evaluation   Pt has had prior anesthetic.     No history of anesthetic complications       ROS/MED HX  ENT/Pulmonary:     (+) tobacco use, Past use,  (-) asthma and sleep apnea   Neurologic:  - neg neurologic ROS  (-) no seizures and no CVA   Cardiovascular:     (+) Dyslipidemia -----Previous cardiac testing   Echo: Date: Results:    Stress Test: Date: Results:    ECG Reviewed: Date: 10/27/22 Results:  Sinus rhythm   Normal ECG   No previous ECGs available  Ventricular rate 98 bpm    Cath: Date: Results:       METS/Exercise Tolerance: >4 METS Comment: Uses treadmill daily or walks outside     Hematologic:  - neg hematologic  ROS  (-) history of blood clots and history of blood transfusion   Musculoskeletal:  - neg musculoskeletal ROS     GI/Hepatic: Comment: Necrotizing pancreatitis, onset 10/2022 following fall in a boat.       (-) GERD and liver disease   Renal/Genitourinary:  - neg Renal ROS  (-) renal disease   Endo: Comment: Diagnosed 10/2022 following trauma to pancreas       (+) type II DM, Last HgA1c: 5.6, date: 10/27/22, Using insulin, - not using insulin pump. thyroid problem, hypothyroidism,     Psychiatric/Substance Use:  - neg psychiatric ROS     Infectious Disease:  - neg infectious disease ROS     Malignancy:  - neg malignancy ROS     Other:  - neg other ROS          Virtual visit -  No vitals were obtained    Physical Exam  Constitutional: Awake, alert, cooperative, no apparent distress, and appears stated age.  HENT: Normocephalic  Respiratory: non labored breathing   Neurologic: Awake, alert, oriented to name, place and time.   Neuropsychiatric: Calm, cooperative. Normal affect.      Prior Labs/Diagnostic Studies   All labs and imaging personally reviewed     EKG - please see in ROS above     Upper EUS 12/8/22  Impression:            - No specimens collected.                          - Walled of central pancreatic necrosis. Not amenable                          to EUS-guided transmural drainage due to limited                          contact with the gastric wall and lack of fluid                          component.                          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                          - See above multidisciplinary discussion. Elected to                          not place plastic stents across the periampullary                          fistula in favor of proceeding to surgical debridement                           as per Dr. Martinez's    CT ABDOMEN PELVIS W CONTRAST, 12/1/2022  IMPRESSION:   1. Evolving sequela of necrotizing pancreatitis with decreased, now  completely encapsulated walled off necrotic collection in the region  of the largely necrosed pancreas which is generally contained within  the lesser sac. Percutaneous drain in similar position with pigtail in  the expected region of the pancreatic body/tail. Stable extent of  residual enhancing pancreatic parenchyma in the head and tail with a  large intervening nonenhancing gap, concerning for disconnected duct.  2. Stable occlusive thrombus within the peripheral splenic vein with  resultant upper abdominal collaterals. The celiac trunk, common and  proper hepatic artery as well as splenic artery are patent.  3. Slightly increased small left pleural effusion with adjacent  compressive atelectasis. No right pleural effusion.      The patient's records and results personally reviewed by this provider.     Labs to be collected on DOS:  T&S      Assessment      Vernon Ortiz is a 62 year old male seen as a PAC referral for risk assessment and optimization for anesthesia.    Plan/Recommendations  Pt will be optimized for the proposed procedure.  See below for details on the assessment, risk, and preoperative recommendations    NEUROLOGY  - No history of TIA, CVA or seizure    -Post Op delirium risk factors:  No risk identified    ENT  - No current airway concerns.  Will need to be reassessed day of surgery.  Mallampati: Unable to assess  TM: Unable to assess    CARDIAC  - No history of CAD, Hypertension and Afib  - METS (Metabolic Equivalents)  Patient performs 4 or more METS exercise without symptoms            Total Score: 0      RCRI-Moderate risk: Class 3  6.6% complication rate            Total Score: 2    RCRI: High Risk Surgery    RCRI: Diabetes        PULMONARY  JUNE Low Risk            Total Score: 2    JUNE: Over 50 ys old    JUNE: Male      - Denies asthma or  "inhaler use  - Tobacco History      History   Smoking Status     Former     Years: 9.00     Types: Cigarettes   Smokeless Tobacco     Never       GI  - Denies GERD   - Necrotizing pancreatitis, onset 10/2022 following fall in a boat.    PONV Medium Risk  Total Score: 2           1 AN PONV: Patient is not a current smoker    1 AN PONV: Intended Post Op Opioids            ENDOCRINE    - BMI: Estimated body mass index is 28.31 kg/m  as calculated from the following:    Height as of 12/16/22: 1.803 m (5' 11\").    Weight as of 12/16/22: 92.1 kg (203 lb).  Healthy Weight (BMI 18.5-24.9)  - IDDM, Diagnosed 10/2022 following trauma to pancreas. On Lantus & Novolog.      HEME  VTE Medium Risk 1.8%            Total Score: 7    VTE: Greater than 59 yrs old    VTE: Male    VTE: Family Hx of VTE      - No history of abnormal bleeding or antiplatelet use.      The patient is aware that the final anesthesia plan will be decided by the assigned anesthesia provider on the date of service.      The patient is optimized for their procedure. AVS with information on surgery time/arrival time, meds and NPO status given by nursing staff. No further diagnostic testing indicated.    Please refer to the physical examination documented by the anesthesiologist in the anesthesia record on the day of surgery.    Video-Visit Details    Type of service:  Video Visit    Provider received verbal consent for a Video Visit from the patient? Yes     Originating Location (pt. Location): Home    Distant Location (provider location):  Off-site  Mode of Communication:  Video Conference via [employer]  On the day of service:     Prep time: 14 minutes  Visit time: 20 minutes  Documentation time: 12 minutes  ------------------------------------------  Total time: 46 minutes      Meeta Potter PA-C  Preoperative Assessment Center  [facility]  [facility]  Phone: [phone]  Fax: [phone]  "

## 2022-12-28 NOTE — PROGRESS NOTES
Vernon is a 62 year old who is being evaluated via a billable video visit.      How would you like to obtain your AVS? MyChart    Pre-Op Exam      HPI         Review of Systems         Objective    Vitals - Patient Reported  Pain Score: No Pain (0)        Physical Exam         HAILEY Kurtz LPN

## 2023-01-05 ENCOUNTER — HOSPITAL ENCOUNTER (INPATIENT)
Facility: CLINIC | Age: 63
LOS: 4 days | Discharge: HOME | End: 2023-01-09
Attending: SURGERY | Admitting: SURGERY
Payer: COMMERCIAL

## 2023-01-05 ENCOUNTER — ANESTHESIA (OUTPATIENT)
Dept: SURGERY | Facility: CLINIC | Age: 63
End: 2023-01-05
Payer: COMMERCIAL

## 2023-01-05 DIAGNOSIS — K85.91 NECROTIZING PANCREATITIS: ICD-10-CM

## 2023-01-05 DIAGNOSIS — E10.9 INSULIN DEPENDENT DIABETES MELLITUS TYPE IA (H): ICD-10-CM

## 2023-01-05 LAB
ABO/RH(D): NORMAL
ANTIBODY SCREEN: NEGATIVE
CREAT SERPL-MCNC: 0.9 MG/DL (ref 0.67–1.17)
GFR SERPL CREATININE-BSD FRML MDRD: >90 ML/MIN/1.73M2
GLUCOSE BLDC GLUCOMTR-MCNC: 134 MG/DL (ref 70–99)
GLUCOSE BLDC GLUCOMTR-MCNC: 149 MG/DL (ref 70–99)
GLUCOSE BLDC GLUCOMTR-MCNC: 153 MG/DL (ref 70–99)
GLUCOSE BLDC GLUCOMTR-MCNC: 98 MG/DL (ref 70–99)
GRAM STAIN RESULT: ABNORMAL
HOLD SPECIMEN: NORMAL
HOLD SPECIMEN: NORMAL
PLATELET # BLD AUTO: 184 10E3/UL (ref 150–450)
SPECIMEN EXPIRATION DATE: NORMAL

## 2023-01-05 PROCEDURE — 49320 DIAG LAPARO SEPARATE PROC: CPT | Mod: GC | Performed by: SURGERY

## 2023-01-05 PROCEDURE — 250N000011 HC RX IP 250 OP 636: Performed by: NURSE ANESTHETIST, CERTIFIED REGISTERED

## 2023-01-05 PROCEDURE — 710N000010 HC RECOVERY PHASE 1, LEVEL 2, PER MIN: Performed by: SURGERY

## 2023-01-05 PROCEDURE — 87102 FUNGUS ISOLATION CULTURE: CPT | Performed by: SURGERY

## 2023-01-05 PROCEDURE — 250N000009 HC RX 250: Performed by: NURSE ANESTHETIST, CERTIFIED REGISTERED

## 2023-01-05 PROCEDURE — 82565 ASSAY OF CREATININE: CPT | Performed by: STUDENT IN AN ORGANIZED HEALTH CARE EDUCATION/TRAINING PROGRAM

## 2023-01-05 PROCEDURE — 370N000017 HC ANESTHESIA TECHNICAL FEE, PER MIN: Performed by: SURGERY

## 2023-01-05 PROCEDURE — 36415 COLL VENOUS BLD VENIPUNCTURE: CPT | Performed by: STUDENT IN AN ORGANIZED HEALTH CARE EDUCATION/TRAINING PROGRAM

## 2023-01-05 PROCEDURE — 82962 GLUCOSE BLOOD TEST: CPT

## 2023-01-05 PROCEDURE — 86901 BLOOD TYPING SEROLOGIC RH(D): CPT | Performed by: PHYSICIAN ASSISTANT

## 2023-01-05 PROCEDURE — 250N000011 HC RX IP 250 OP 636: Performed by: ANESTHESIOLOGY

## 2023-01-05 PROCEDURE — 250N000011 HC RX IP 250 OP 636: Performed by: SURGERY

## 2023-01-05 PROCEDURE — 0FCG0ZZ EXTIRPATION OF MATTER FROM PANCREAS, OPEN APPROACH: ICD-10-PCS | Performed by: SURGERY

## 2023-01-05 PROCEDURE — 87205 SMEAR GRAM STAIN: CPT | Performed by: SURGERY

## 2023-01-05 PROCEDURE — 258N000003 HC RX IP 258 OP 636: Performed by: NURSE ANESTHETIST, CERTIFIED REGISTERED

## 2023-01-05 PROCEDURE — 360N000076 HC SURGERY LEVEL 3, PER MIN: Performed by: SURGERY

## 2023-01-05 PROCEDURE — 120N000002 HC R&B MED SURG/OB UMMC

## 2023-01-05 PROCEDURE — 999N000141 HC STATISTIC PRE-PROCEDURE NURSING ASSESSMENT: Performed by: SURGERY

## 2023-01-05 PROCEDURE — 250N000025 HC SEVOFLURANE, PER MIN: Performed by: SURGERY

## 2023-01-05 PROCEDURE — 85049 AUTOMATED PLATELET COUNT: CPT | Performed by: SURGERY

## 2023-01-05 PROCEDURE — 258N000003 HC RX IP 258 OP 636: Performed by: STUDENT IN AN ORGANIZED HEALTH CARE EDUCATION/TRAINING PROGRAM

## 2023-01-05 PROCEDURE — 87075 CULTR BACTERIA EXCEPT BLOOD: CPT | Performed by: SURGERY

## 2023-01-05 PROCEDURE — 272N000001 HC OR GENERAL SUPPLY STERILE: Performed by: SURGERY

## 2023-01-05 PROCEDURE — 250N000013 HC RX MED GY IP 250 OP 250 PS 637: Performed by: STUDENT IN AN ORGANIZED HEALTH CARE EDUCATION/TRAINING PROGRAM

## 2023-01-05 PROCEDURE — 87077 CULTURE AEROBIC IDENTIFY: CPT | Performed by: SURGERY

## 2023-01-05 PROCEDURE — 36415 COLL VENOUS BLD VENIPUNCTURE: CPT | Performed by: PHYSICIAN ASSISTANT

## 2023-01-05 PROCEDURE — 250N000012 HC RX MED GY IP 250 OP 636 PS 637: Performed by: STUDENT IN AN ORGANIZED HEALTH CARE EDUCATION/TRAINING PROGRAM

## 2023-01-05 RX ORDER — SODIUM CHLORIDE, SODIUM LACTATE, POTASSIUM CHLORIDE, CALCIUM CHLORIDE 600; 310; 30; 20 MG/100ML; MG/100ML; MG/100ML; MG/100ML
INJECTION, SOLUTION INTRAVENOUS CONTINUOUS
Status: DISCONTINUED | OUTPATIENT
Start: 2023-01-05 | End: 2023-01-06

## 2023-01-05 RX ORDER — FENTANYL CITRATE 50 UG/ML
50 INJECTION, SOLUTION INTRAMUSCULAR; INTRAVENOUS EVERY 5 MIN PRN
Status: DISCONTINUED | OUTPATIENT
Start: 2023-01-05 | End: 2023-01-05 | Stop reason: HOSPADM

## 2023-01-05 RX ORDER — LEVOTHYROXINE SODIUM 100 UG/1
100 TABLET ORAL EVERY MORNING
Status: DISCONTINUED | OUTPATIENT
Start: 2023-01-06 | End: 2023-01-09 | Stop reason: HOSPADM

## 2023-01-05 RX ORDER — SODIUM CHLORIDE, SODIUM LACTATE, POTASSIUM CHLORIDE, CALCIUM CHLORIDE 600; 310; 30; 20 MG/100ML; MG/100ML; MG/100ML; MG/100ML
INJECTION, SOLUTION INTRAVENOUS CONTINUOUS
Status: DISCONTINUED | OUTPATIENT
Start: 2023-01-05 | End: 2023-01-05 | Stop reason: HOSPADM

## 2023-01-05 RX ORDER — ALBUTEROL SULFATE 0.83 MG/ML
2.5 SOLUTION RESPIRATORY (INHALATION) EVERY 4 HOURS PRN
Status: DISCONTINUED | OUTPATIENT
Start: 2023-01-05 | End: 2023-01-05 | Stop reason: HOSPADM

## 2023-01-05 RX ORDER — GLYCOPYRROLATE 0.2 MG/ML
INJECTION, SOLUTION INTRAMUSCULAR; INTRAVENOUS PRN
Status: DISCONTINUED | OUTPATIENT
Start: 2023-01-05 | End: 2023-01-05

## 2023-01-05 RX ORDER — HYDROMORPHONE HYDROCHLORIDE 1 MG/ML
0.4 INJECTION, SOLUTION INTRAMUSCULAR; INTRAVENOUS; SUBCUTANEOUS EVERY 5 MIN PRN
Status: DISCONTINUED | OUTPATIENT
Start: 2023-01-05 | End: 2023-01-05 | Stop reason: HOSPADM

## 2023-01-05 RX ORDER — LIDOCAINE HYDROCHLORIDE 20 MG/ML
INJECTION, SOLUTION INFILTRATION; PERINEURAL PRN
Status: DISCONTINUED | OUTPATIENT
Start: 2023-01-05 | End: 2023-01-05

## 2023-01-05 RX ORDER — ACETAMINOPHEN 325 MG/1
650 TABLET ORAL EVERY 4 HOURS PRN
Status: DISCONTINUED | OUTPATIENT
Start: 2023-01-08 | End: 2023-01-09 | Stop reason: HOSPADM

## 2023-01-05 RX ORDER — FENTANYL CITRATE 50 UG/ML
25 INJECTION, SOLUTION INTRAMUSCULAR; INTRAVENOUS EVERY 5 MIN PRN
Status: DISCONTINUED | OUTPATIENT
Start: 2023-01-05 | End: 2023-01-05 | Stop reason: HOSPADM

## 2023-01-05 RX ORDER — HYDROMORPHONE HCL IN WATER/PF 6 MG/30 ML
0.2 PATIENT CONTROLLED ANALGESIA SYRINGE INTRAVENOUS
Status: DISCONTINUED | OUTPATIENT
Start: 2023-01-05 | End: 2023-01-06

## 2023-01-05 RX ORDER — DEXTROSE MONOHYDRATE 25 G/50ML
25-50 INJECTION, SOLUTION INTRAVENOUS
Status: DISCONTINUED | OUTPATIENT
Start: 2023-01-05 | End: 2023-01-09 | Stop reason: HOSPADM

## 2023-01-05 RX ORDER — OXYCODONE HYDROCHLORIDE 5 MG/1
5 TABLET ORAL EVERY 4 HOURS PRN
Status: DISCONTINUED | OUTPATIENT
Start: 2023-01-05 | End: 2023-01-09 | Stop reason: HOSPADM

## 2023-01-05 RX ORDER — LABETALOL HYDROCHLORIDE 5 MG/ML
10 INJECTION, SOLUTION INTRAVENOUS
Status: DISCONTINUED | OUTPATIENT
Start: 2023-01-05 | End: 2023-01-05 | Stop reason: HOSPADM

## 2023-01-05 RX ORDER — HYDROMORPHONE HYDROCHLORIDE 1 MG/ML
0.2 INJECTION, SOLUTION INTRAMUSCULAR; INTRAVENOUS; SUBCUTANEOUS EVERY 5 MIN PRN
Status: DISCONTINUED | OUTPATIENT
Start: 2023-01-05 | End: 2023-01-05 | Stop reason: HOSPADM

## 2023-01-05 RX ORDER — SODIUM CHLORIDE, SODIUM LACTATE, POTASSIUM CHLORIDE, CALCIUM CHLORIDE 600; 310; 30; 20 MG/100ML; MG/100ML; MG/100ML; MG/100ML
INJECTION, SOLUTION INTRAVENOUS CONTINUOUS PRN
Status: DISCONTINUED | OUTPATIENT
Start: 2023-01-05 | End: 2023-01-05

## 2023-01-05 RX ORDER — ONDANSETRON 2 MG/ML
4 INJECTION INTRAMUSCULAR; INTRAVENOUS EVERY 6 HOURS PRN
Status: DISCONTINUED | OUTPATIENT
Start: 2023-01-05 | End: 2023-01-09 | Stop reason: HOSPADM

## 2023-01-05 RX ORDER — ONDANSETRON 2 MG/ML
4 INJECTION INTRAMUSCULAR; INTRAVENOUS EVERY 30 MIN PRN
Status: DISCONTINUED | OUTPATIENT
Start: 2023-01-05 | End: 2023-01-05 | Stop reason: HOSPADM

## 2023-01-05 RX ORDER — NICOTINE POLACRILEX 4 MG
15-30 LOZENGE BUCCAL
Status: DISCONTINUED | OUTPATIENT
Start: 2023-01-05 | End: 2023-01-09 | Stop reason: HOSPADM

## 2023-01-05 RX ORDER — NALOXONE HYDROCHLORIDE 0.4 MG/ML
0.4 INJECTION, SOLUTION INTRAMUSCULAR; INTRAVENOUS; SUBCUTANEOUS
Status: DISCONTINUED | OUTPATIENT
Start: 2023-01-05 | End: 2023-01-09 | Stop reason: HOSPADM

## 2023-01-05 RX ORDER — BUPIVACAINE HYDROCHLORIDE 2.5 MG/ML
INJECTION, SOLUTION INFILTRATION; PERINEURAL PRN
Status: DISCONTINUED | OUTPATIENT
Start: 2023-01-05 | End: 2023-01-05 | Stop reason: HOSPADM

## 2023-01-05 RX ORDER — FENTANYL CITRATE 50 UG/ML
INJECTION, SOLUTION INTRAMUSCULAR; INTRAVENOUS PRN
Status: DISCONTINUED | OUTPATIENT
Start: 2023-01-05 | End: 2023-01-05

## 2023-01-05 RX ORDER — ONDANSETRON 4 MG/1
4 TABLET, ORALLY DISINTEGRATING ORAL EVERY 30 MIN PRN
Status: DISCONTINUED | OUTPATIENT
Start: 2023-01-05 | End: 2023-01-05 | Stop reason: HOSPADM

## 2023-01-05 RX ORDER — PROCHLORPERAZINE MALEATE 5 MG
10 TABLET ORAL EVERY 6 HOURS PRN
Status: DISCONTINUED | OUTPATIENT
Start: 2023-01-05 | End: 2023-01-09 | Stop reason: HOSPADM

## 2023-01-05 RX ORDER — PROPOFOL 10 MG/ML
INJECTION, EMULSION INTRAVENOUS PRN
Status: DISCONTINUED | OUTPATIENT
Start: 2023-01-05 | End: 2023-01-05

## 2023-01-05 RX ORDER — ENOXAPARIN SODIUM 100 MG/ML
40 INJECTION SUBCUTANEOUS EVERY 24 HOURS
Status: DISCONTINUED | OUTPATIENT
Start: 2023-01-07 | End: 2023-01-06

## 2023-01-05 RX ORDER — NALOXONE HYDROCHLORIDE 0.4 MG/ML
0.2 INJECTION, SOLUTION INTRAMUSCULAR; INTRAVENOUS; SUBCUTANEOUS
Status: DISCONTINUED | OUTPATIENT
Start: 2023-01-05 | End: 2023-01-09 | Stop reason: HOSPADM

## 2023-01-05 RX ORDER — BISACODYL 10 MG
10 SUPPOSITORY, RECTAL RECTAL DAILY PRN
Status: DISCONTINUED | OUTPATIENT
Start: 2023-01-05 | End: 2023-01-09 | Stop reason: HOSPADM

## 2023-01-05 RX ORDER — ONDANSETRON 2 MG/ML
INJECTION INTRAMUSCULAR; INTRAVENOUS PRN
Status: DISCONTINUED | OUTPATIENT
Start: 2023-01-05 | End: 2023-01-05

## 2023-01-05 RX ORDER — OXYCODONE HYDROCHLORIDE 10 MG/1
10 TABLET ORAL EVERY 4 HOURS PRN
Status: DISCONTINUED | OUTPATIENT
Start: 2023-01-05 | End: 2023-01-06

## 2023-01-05 RX ORDER — ONDANSETRON 4 MG/1
4 TABLET, ORALLY DISINTEGRATING ORAL EVERY 6 HOURS PRN
Status: DISCONTINUED | OUTPATIENT
Start: 2023-01-05 | End: 2023-01-09 | Stop reason: HOSPADM

## 2023-01-05 RX ORDER — DEXAMETHASONE SODIUM PHOSPHATE 4 MG/ML
INJECTION, SOLUTION INTRA-ARTICULAR; INTRALESIONAL; INTRAMUSCULAR; INTRAVENOUS; SOFT TISSUE PRN
Status: DISCONTINUED | OUTPATIENT
Start: 2023-01-05 | End: 2023-01-05

## 2023-01-05 RX ORDER — ACETAMINOPHEN 325 MG/1
975 TABLET ORAL EVERY 8 HOURS
Status: COMPLETED | OUTPATIENT
Start: 2023-01-05 | End: 2023-01-08

## 2023-01-05 RX ORDER — CEFAZOLIN SODIUM/WATER 2 G/20 ML
2 SYRINGE (ML) INTRAVENOUS
Status: COMPLETED | OUTPATIENT
Start: 2023-01-05 | End: 2023-01-05

## 2023-01-05 RX ORDER — POLYETHYLENE GLYCOL 3350 17 G/17G
17 POWDER, FOR SOLUTION ORAL DAILY
Status: DISCONTINUED | OUTPATIENT
Start: 2023-01-06 | End: 2023-01-09 | Stop reason: HOSPADM

## 2023-01-05 RX ORDER — LIDOCAINE 40 MG/G
CREAM TOPICAL
Status: DISCONTINUED | OUTPATIENT
Start: 2023-01-05 | End: 2023-01-09 | Stop reason: HOSPADM

## 2023-01-05 RX ORDER — HYDROMORPHONE HCL IN WATER/PF 6 MG/30 ML
0.4 PATIENT CONTROLLED ANALGESIA SYRINGE INTRAVENOUS
Status: DISCONTINUED | OUTPATIENT
Start: 2023-01-05 | End: 2023-01-06

## 2023-01-05 RX ORDER — AMOXICILLIN 250 MG
1 CAPSULE ORAL 2 TIMES DAILY
Status: DISCONTINUED | OUTPATIENT
Start: 2023-01-05 | End: 2023-01-09 | Stop reason: HOSPADM

## 2023-01-05 RX ORDER — MEPERIDINE HYDROCHLORIDE 25 MG/ML
12.5 INJECTION INTRAMUSCULAR; INTRAVENOUS; SUBCUTANEOUS
Status: DISCONTINUED | OUTPATIENT
Start: 2023-01-05 | End: 2023-01-05 | Stop reason: HOSPADM

## 2023-01-05 RX ORDER — FENTANYL CITRATE 50 UG/ML
25 INJECTION, SOLUTION INTRAMUSCULAR; INTRAVENOUS
Status: DISCONTINUED | OUTPATIENT
Start: 2023-01-05 | End: 2023-01-05 | Stop reason: HOSPADM

## 2023-01-05 RX ORDER — SIMVASTATIN 40 MG
40 TABLET ORAL AT BEDTIME
Status: DISCONTINUED | OUTPATIENT
Start: 2023-01-05 | End: 2023-01-09 | Stop reason: HOSPADM

## 2023-01-05 RX ORDER — CEFAZOLIN SODIUM/WATER 2 G/20 ML
2 SYRINGE (ML) INTRAVENOUS SEE ADMIN INSTRUCTIONS
Status: DISCONTINUED | OUTPATIENT
Start: 2023-01-05 | End: 2023-01-05 | Stop reason: HOSPADM

## 2023-01-05 RX ADMIN — LIDOCAINE HYDROCHLORIDE 100 MG: 20 INJECTION, SOLUTION INFILTRATION; PERINEURAL at 13:00

## 2023-01-05 RX ADMIN — NOREPINEPHRINE BITARTRATE 6.4 MCG: 1 INJECTION, SOLUTION, CONCENTRATE INTRAVENOUS at 14:11

## 2023-01-05 RX ADMIN — SODIUM CHLORIDE, POTASSIUM CHLORIDE, SODIUM LACTATE AND CALCIUM CHLORIDE: 600; 310; 30; 20 INJECTION, SOLUTION INTRAVENOUS at 14:49

## 2023-01-05 RX ADMIN — PHENYLEPHRINE HYDROCHLORIDE 100 MCG: 10 INJECTION INTRAVENOUS at 14:28

## 2023-01-05 RX ADMIN — FENTANYL CITRATE 50 MCG: 50 INJECTION, SOLUTION INTRAMUSCULAR; INTRAVENOUS at 13:29

## 2023-01-05 RX ADMIN — SODIUM CHLORIDE, POTASSIUM CHLORIDE, SODIUM LACTATE AND CALCIUM CHLORIDE: 600; 310; 30; 20 INJECTION, SOLUTION INTRAVENOUS at 12:45

## 2023-01-05 RX ADMIN — FENTANYL CITRATE 25 MCG: 50 INJECTION, SOLUTION INTRAMUSCULAR; INTRAVENOUS at 15:30

## 2023-01-05 RX ADMIN — NOREPINEPHRINE BITARTRATE 6.4 MCG: 1 INJECTION, SOLUTION, CONCENTRATE INTRAVENOUS at 14:17

## 2023-01-05 RX ADMIN — ACETAMINOPHEN 975 MG: 325 TABLET, FILM COATED ORAL at 16:19

## 2023-01-05 RX ADMIN — PHENYLEPHRINE HYDROCHLORIDE 100 MCG: 10 INJECTION INTRAVENOUS at 13:46

## 2023-01-05 RX ADMIN — FENTANYL CITRATE 25 MCG: 50 INJECTION, SOLUTION INTRAMUSCULAR; INTRAVENOUS at 15:23

## 2023-01-05 RX ADMIN — FENTANYL CITRATE 25 MCG: 50 INJECTION, SOLUTION INTRAMUSCULAR; INTRAVENOUS at 16:38

## 2023-01-05 RX ADMIN — FENTANYL CITRATE 25 MCG: 50 INJECTION, SOLUTION INTRAMUSCULAR; INTRAVENOUS at 16:08

## 2023-01-05 RX ADMIN — FENTANYL CITRATE 50 MCG: 50 INJECTION, SOLUTION INTRAMUSCULAR; INTRAVENOUS at 12:58

## 2023-01-05 RX ADMIN — PROPOFOL 150 MG: 10 INJECTION, EMULSION INTRAVENOUS at 13:00

## 2023-01-05 RX ADMIN — GLYCOPYRROLATE 0.2 MG: 0.2 INJECTION, SOLUTION INTRAMUSCULAR; INTRAVENOUS at 14:01

## 2023-01-05 RX ADMIN — DEXAMETHASONE SODIUM PHOSPHATE 6 MG: 4 INJECTION, SOLUTION INTRA-ARTICULAR; INTRALESIONAL; INTRAMUSCULAR; INTRAVENOUS; SOFT TISSUE at 13:22

## 2023-01-05 RX ADMIN — Medication 2 G: at 13:10

## 2023-01-05 RX ADMIN — SODIUM CHLORIDE, POTASSIUM CHLORIDE, SODIUM LACTATE AND CALCIUM CHLORIDE: 600; 310; 30; 20 INJECTION, SOLUTION INTRAVENOUS at 16:05

## 2023-01-05 RX ADMIN — ONDANSETRON 4 MG: 2 INJECTION INTRAMUSCULAR; INTRAVENOUS at 14:36

## 2023-01-05 RX ADMIN — SUGAMMADEX 200 MG: 100 INJECTION, SOLUTION INTRAVENOUS at 14:54

## 2023-01-05 RX ADMIN — PHENYLEPHRINE HYDROCHLORIDE 100 MCG: 10 INJECTION INTRAVENOUS at 13:55

## 2023-01-05 RX ADMIN — Medication 20 MG: at 13:44

## 2023-01-05 RX ADMIN — SIMVASTATIN 40 MG: 40 TABLET, FILM COATED ORAL at 21:06

## 2023-01-05 RX ADMIN — MIDAZOLAM 2 MG: 1 INJECTION INTRAMUSCULAR; INTRAVENOUS at 12:45

## 2023-01-05 RX ADMIN — INSULIN ASPART 1 UNITS: 100 INJECTION, SOLUTION INTRAVENOUS; SUBCUTANEOUS at 20:29

## 2023-01-05 RX ADMIN — Medication 50 MG: at 13:00

## 2023-01-05 RX ADMIN — HYDROMORPHONE HYDROCHLORIDE 0.5 MG: 1 INJECTION, SOLUTION INTRAMUSCULAR; INTRAVENOUS; SUBCUTANEOUS at 14:34

## 2023-01-05 RX ADMIN — HYDROMORPHONE HYDROCHLORIDE 0.5 MG: 1 INJECTION, SOLUTION INTRAMUSCULAR; INTRAVENOUS; SUBCUTANEOUS at 14:46

## 2023-01-05 ASSESSMENT — ACTIVITIES OF DAILY LIVING (ADL)
ADLS_ACUITY_SCORE: 20
NUMBER_OF_TIMES_PATIENT_HAS_FALLEN_WITHIN_LAST_SIX_MONTHS: 1
DIFFICULTY_EATING/SWALLOWING: NO
CONCENTRATING,_REMEMBERING_OR_MAKING_DECISIONS_DIFFICULTY: NO
ADLS_ACUITY_SCORE: 35
ADLS_ACUITY_SCORE: 35
VISION_MANAGEMENT: GLASSES
FALL_HISTORY_WITHIN_LAST_SIX_MONTHS: YES
DRESSING/BATHING_DIFFICULTY: NO
ADLS_ACUITY_SCORE: 35
CHANGE_IN_FUNCTIONAL_STATUS_SINCE_ONSET_OF_CURRENT_ILLNESS/INJURY: NO
ADLS_ACUITY_SCORE: 20
DOING_ERRANDS_INDEPENDENTLY_DIFFICULTY: NO
ADLS_ACUITY_SCORE: 33
DIFFICULTY_COMMUNICATING: NO
ADLS_ACUITY_SCORE: 35
ADLS_ACUITY_SCORE: 35
WALKING_OR_CLIMBING_STAIRS_DIFFICULTY: NO
HEARING_DIFFICULTY_OR_DEAF: NO
WEAR_GLASSES_OR_BLIND: YES
TOILETING_ISSUES: NO

## 2023-01-05 ASSESSMENT — LIFESTYLE VARIABLES: TOBACCO_USE: 1

## 2023-01-05 ASSESSMENT — ENCOUNTER SYMPTOMS: SEIZURES: 0

## 2023-01-05 NOTE — ANESTHESIA PROCEDURE NOTES
Airway       Patient location during procedure: OR       Procedure Start/Stop Times: 1/5/2023 1:04 PM  Staff -        Anesthesiologist:  Otto Lambert MD       CRNA: Tiffanie Campa APRN CRNA       Performed By: CRNAIndications and Patient Condition       Indications for airway management: ángel-procedural       Induction type:intravenous       Mask difficulty assessment: 2 - vent by mask + OA or adjuvant +/- NMBA    Final Airway Details       Final airway type: endotracheal airway       Successful airway: ETT - single  Endotracheal Airway Details        ETT size (mm): 7.5       Cuffed: yes       Successful intubation technique: direct laryngoscopy       DL Blade Type: MAC 4       Grade View of Cords: 2       Adjucts: stylet       Position: Right       Measured from: lips       Secured at (cm): 23       Bite block used: None    Post intubation assessment        Placement verified by: capnometry, equal breath sounds and chest rise        Number of attempts at approach: 2       Number of other approaches attempted: 0       Secured with: pink tape       Ease of procedure: easy       Dentition: Unchanged    Medication(s) Administered   Medication Administration Time: 1/5/2023 1:04 PM    Additional Comments       Attempt x1 with riley 2 blade, blade buried in mouth. Switched to MAC 4 with success of intubation

## 2023-01-05 NOTE — ANESTHESIA PREPROCEDURE EVALUATION
Anesthesia Pre-Procedure Evaluation    Patient: Vernon Ortiz   MRN: [id number] : 1960        Procedure : Procedure(s):  LAPAROSCOPY, DIAGNOSTIC, BY GENERAL SURGERY, PANCREAS NECROSECTOMY          Past Medical History:   Diagnosis Date     Diabetes mellitus (H)      HLD (hyperlipidemia)      Hypothyroidism      Necrotizing pancreatitis       Past Surgical History:   Procedure Laterality Date     APPENDECTOMY       ENDOSCOPIC ULTRASOUND UPPER GASTROINTESTINAL TRACT (GI) N/A 2022    Procedure: ESOPHAGOGASTRODUODENOSCOPY, WITH ENDOSCOPIC ULTRASOUND,;  Surgeon: Krish Ellison MD;  [facility]     IR ABSCESS TUBE CHANGE  2022     IR PERITONEAL ABSCESS DRAINAGE  10/26/2022     IR SINOGRAM INJECTION DIAGNOSTIC  11/15/2022      No Known Allergies   Social History     Tobacco Use     Smoking status: Former     Years: 9.00     Types: Cigarettes     Smokeless tobacco: Never   Substance Use Topics     Alcohol use: Never      Wt Readings from Last 1 Encounters:   23 92.4 kg (203 lb 11.3 oz)        Anesthesia Evaluation   Pt has had prior anesthetic. Type of anesthetic: Gr1 Mac4 for EUS 2022.    No history of anesthetic complications       ROS/MED HX  ENT/Pulmonary:     (+) tobacco use, Past use,  (-) asthma and sleep apnea   Neurologic:  - neg neurologic ROS  (-) no seizures and no CVA   Cardiovascular:     (+) Dyslipidemia -----Previous cardiac testing   Echo: Date: Results:    Stress Test: Date: Results:    ECG Reviewed: Date: 10/27/22 Results:  Sinus rhythm   Normal ECG   No previous ECGs available  Ventricular rate 98 bpm    Cath: Date: Results:      METS/Exercise Tolerance: >4 METS Comment: Uses treadmill daily or walks outside     Hematologic:  - neg hematologic  ROS  (-) history of blood clots and history of blood transfusion   Musculoskeletal:  - neg musculoskeletal ROS     GI/Hepatic: Comment: Necrotizing pancreatitis, onset 10/2022 following fall in a boat.       (-) GERD and  liver disease   Renal/Genitourinary:  - neg Renal ROS  (-) renal disease   Endo: Comment: Diagnosed 10/2022 following trauma to pancreas       (+) type II DM, Last HgA1c: 5.6, date: 10/27/22, Using insulin, - not using insulin pump. thyroid problem, hypothyroidism,     Psychiatric/Substance Use:  - neg psychiatric ROS     Infectious Disease:  - neg infectious disease ROS     Malignancy:  - neg malignancy ROS     Other:  - neg other ROS          Physical Exam    Airway        Mallampati: II   TM distance: > 3 FB   Neck ROM: full   Mouth opening: > 3 cm    Respiratory Devices and Support         Dental  no notable dental history         Cardiovascular          Rhythm and rate: regular and normal     Pulmonary           breath sounds clear to auscultation           OUTSIDE LABS:  CBC:   Lab Results   Component Value Date    WBC 6.4 12/08/2022    WBC 12.5 (H) 11/07/2022    HGB 14.0 12/08/2022    HGB 12.9 (L) 11/07/2022    HCT 43.2 12/08/2022    HCT 40.0 11/07/2022     12/08/2022     (H) 11/07/2022     BMP:   Lab Results   Component Value Date     12/08/2022     (L) 11/07/2022    POTASSIUM 4.4 12/08/2022    POTASSIUM 4.2 11/07/2022    CHLORIDE 103 12/08/2022    CHLORIDE 100 11/07/2022    CO2 23 12/08/2022    CO2 21 (L) 11/07/2022    BUN 16.3 12/08/2022    BUN 14.6 11/07/2022    CR 0.89 12/08/2022    CR 0.77 11/07/2022     (H) 01/05/2023    GLC 93 12/08/2022     COAGS:   Lab Results   Component Value Date    INR 1.02 12/08/2022     POC: No results found for: BGM, HCG, HCGS  HEPATIC:   Lab Results   Component Value Date    ALBUMIN 2.8 (L) 10/29/2022    PROTTOTAL 5.7 (L) 10/29/2022    ALT 22 10/29/2022    AST 25 10/29/2022    ALKPHOS 64 10/29/2022    BILITOTAL 1.0 10/29/2022     OTHER:   Lab Results   Component Value Date    A1C 5.6 10/27/2022    DOLORES 9.5 12/08/2022    PHOS 3.1 11/07/2022    MAG 1.9 10/30/2022    .00 (H) 11/03/2022       Anesthesia Plan    ASA Status:  3   NPO Status:   NPO Appropriate    Anesthesia Type: General.     - Airway: ETT   Induction: Intravenous, Propofol.   Maintenance: Balanced.        Consents    Anesthesia Plan(s) and associated risks, benefits, and realistic alternatives discussed. Questions answered and patient/representative(s) expressed understanding.    - Discussed:     - Discussed with:  Patient      - Extended Intubation/Ventilatory Support Discussed: No.      - Patient is DNR/DNI Status: No    Use of blood products discussed: Yes.     - Discussed with: Patient.     - Consented: consented to blood products            Reason for refusal: other.     Postoperative Care    Pain management: IV analgesics, Oral pain medications.   PONV prophylaxis: Ondansetron (or other 5HT-3), Dexamethasone or Solumedrol     Comments:                Otto Lambert MD

## 2023-01-05 NOTE — ANESTHESIA POSTPROCEDURE EVALUATION
Patient: Vernon Ortiz    Procedure: Procedure(s):  LAPAROSCOPY, DIAGNOSTIC, BY GENERAL SURGERY, PANCREAS NECROSECTOMY       Anesthesia Type:  General    Note:  Disposition: Admission   Postop Pain Control: Uneventful            Sign Out: Well controlled pain   PONV: No   Neuro/Psych: Uneventful            Sign Out: Acceptable/Baseline neuro status   Airway/Respiratory: Uneventful            Sign Out: Acceptable/Baseline resp. status   CV/Hemodynamics: Uneventful            Sign Out: Acceptable CV status; No obvious hypovolemia; No obvious fluid overload   Other NRE: NONE   DID A NON-ROUTINE EVENT OCCUR? No           Last vitals:  Vitals Value Taken Time   /86 01/05/23 1545   Temp 36.1  C (97  F) 01/05/23 1515   Pulse 86 01/05/23 1549   Resp 8 01/05/23 1549   SpO2 96 % 01/05/23 1549   Vitals shown include unvalidated device data.    Electronically Signed By: Fidel Solitario MD  January 5, 2023  3:50 PM

## 2023-01-05 NOTE — ANESTHESIA CARE TRANSFER NOTE
Patient: Vernon Ortiz    Procedure: Procedure(s):  LAPAROSCOPY, DIAGNOSTIC, BY GENERAL SURGERY, PANCREAS NECROSECTOMY       Diagnosis: Necrotizing pancreatitis [K85.91]  Diagnosis Additional Information: No value filed.    Anesthesia Type:   General     Note:    Oropharynx: oropharynx clear of all foreign objects and spontaneously breathing  Level of Consciousness: drowsy  Oxygen Supplementation: face mask  Level of Supplemental Oxygen (L/min / FiO2): 6  Independent Airway: airway patency satisfactory and stable  Dentition: dentition unchanged  Vital Signs Stable: post-procedure vital signs reviewed and stable  Report to RN Given: handoff report given  Patient transferred to: PACU    Handoff Report: Identifed the Patient, Identified the Reponsible Provider, Reviewed the pertinent medical history, Discussed the surgical course, Reviewed Intra-OP anesthesia mangement and issues during anesthesia, Set expectations for post-procedure period and Allowed opportunity for questions and acknowledgement of understanding      Vitals:  Vitals Value Taken Time   /85 01/05/23 1507   Temp     Pulse 81 01/05/23 1510   Resp 12 01/05/23 1510   SpO2 100 % 01/05/23 1510   Vitals shown include unvalidated device data.    Electronically Signed By: CHRISTIAN Whitman CRNA  January 5, 2023  3:10 PM

## 2023-01-05 NOTE — OP NOTE
[facility] Operative Note    Pre-operative diagnosis: Necrotizing pancreatitis [K85.91]   Post-operative diagnosis same   Procedure: Procedure(s):  LAPAROSCOPY, DIAGNOSTIC, BY GENERAL SURGERY, PANCREAS NECROSECTOMY   Surgeon: Paulo Martinez MD   Assistants(s): Xavier Dickinson MD   Estimated blood loss: 30ml    Specimens: Necrotic debris for culture   Findings: Necrotic pancreas without significant surrounding fluid.         Vernon Ortiz was brought to the operating room and placed supine on the operating table.  ABX were given.  They were sedated and intubated without issue, an OG tube was placed.   The abdomen was prepped and draped in sterile fashion which included the anterior abdominal drain and a time out was performed.    An upper midline incision 7cm in length was sharply made and carried down to fascia with cautery.  Fascia was sharply entered and the peritoneum bluntly entered.  A dual ring wound protector was placed.  The abdomen was explored, minimal adhesions were found.  The drain tract was identifed and grasped.  It was freed from the anterior abdominal wall and the drain was cut externally and then internalized into the abdomen.  Using ligasure we dissected down along the superior/lateral aspect of the drain tract (this was done to avoid the middle colic vessels and bowel that were medial and inferior to the drain on preoperative imaging).  We carried this down to the posterior edge of the peritoneal cavity.  Visualization was difficult at this point- so a gel port was attached to the wound protector and the abdomen insufflated. We placed 3 additional 5mm trocars (one in the LUQ, one in the left mid abdomen, and the other in the midline above the umbilicus.    Using the hand port we were able to palpate the hardened necrotic pancreatic debris.  We bluntly followed the drain tract into this space with identification of necrotic material.  Our opening was widened laterally with the ligasure  through the thickened capsule rind surrounding the pancreas.  We irrigated and gently explored the space digitally.  We were then able to grasp and gently remove a 14 cm solid piece of necrotic pancreas- this appeared to be the entire portion of necrotic pancreas seen on prior imaging.  This was removed from the abdomen through the hand port- a portion was sent for cultures.    We irrigated this space and explored for other solid debris- which was not found.  Through our left lateral ports we introduced two 19F round drains that were fed into the pancreatic space. The lower abdominal drain was fed laterally in what had been the pancreatic tail, and the upper drain was fed medially towards the pancreatic head.        The ports were then removed and the abdomen allowed to collapse.  The upper midline incision was closed in layers- we closed the peritoneum with 2.0 vicryl and the fascia with non-looped 0PDS.  Skin at the port sites were closed with 4.0 monocryl and steri strips applied. The upper midline incision was closed with flo. Vernon Ortiz was then woken from anesthesia, extubated and brought to recovery.    I performed the procedure.

## 2023-01-05 NOTE — BRIEF OP NOTE
[facility] Brief Operative Note    Pre-operative diagnosis: Necrotizing pancreatitis [K85.91]   Post-operative diagnosis same   Procedure: Procedure(s):  LAPAROSCOPY, DIAGNOSTIC, BY GENERAL SURGERY, PANCREAS NECROSECTOMY   Surgeon(s): Surgeon(s) and Role:     * Paulo Martinez MD - Primary     * Ria Dickinson MD - Resident - Assisting     * Vernon Handy DO   Estimated blood loss: 30 mL    Specimens: ID Type Source Tests Collected by Time Destination   A : Necrotic Pancreas Tissue Pancreas ANAEROBIC BACTERIAL CULTURE ROUTINE, GRAM STAIN, FUNGAL OR YEAST CULTURE ROUTINE, AEROBIC BACTERIAL CULTURE ROUTINE Paulo Martinez MD 1/5/2023  2:22 PM       Findings: Expected findings of necrotic pancreas, minimal fluid around it.  Removed, two drains placed into pancreatic bed.

## 2023-01-06 LAB
AMYLASE BODY FLUID SOURCE: NORMAL
AMYLASE FLD-CCNC: NORMAL U/L
ANION GAP SERPL CALCULATED.3IONS-SCNC: 12 MMOL/L (ref 7–15)
BASOPHILS # BLD AUTO: 0 10E3/UL (ref 0–0.2)
BASOPHILS NFR BLD AUTO: 0 %
BUN SERPL-MCNC: 11.8 MG/DL (ref 8–23)
CALCIUM SERPL-MCNC: 8.8 MG/DL (ref 8.8–10.2)
CHLORIDE SERPL-SCNC: 102 MMOL/L (ref 98–107)
CREAT SERPL-MCNC: 0.84 MG/DL (ref 0.67–1.17)
DEPRECATED HCO3 PLAS-SCNC: 21 MMOL/L (ref 22–29)
EOSINOPHIL # BLD AUTO: 0 10E3/UL (ref 0–0.7)
EOSINOPHIL NFR BLD AUTO: 0 %
ERYTHROCYTE [DISTWIDTH] IN BLOOD BY AUTOMATED COUNT: 12.8 % (ref 10–15)
GFR SERPL CREATININE-BSD FRML MDRD: >90 ML/MIN/1.73M2
GLUCOSE BLDC GLUCOMTR-MCNC: 152 MG/DL (ref 70–99)
GLUCOSE BLDC GLUCOMTR-MCNC: 170 MG/DL (ref 70–99)
GLUCOSE BLDC GLUCOMTR-MCNC: 185 MG/DL (ref 70–99)
GLUCOSE BLDC GLUCOMTR-MCNC: 188 MG/DL (ref 70–99)
GLUCOSE BLDC GLUCOMTR-MCNC: 206 MG/DL (ref 70–99)
GLUCOSE BLDC GLUCOMTR-MCNC: 208 MG/DL (ref 70–99)
GLUCOSE BLDC GLUCOMTR-MCNC: 234 MG/DL (ref 70–99)
GLUCOSE SERPL-MCNC: 187 MG/DL (ref 70–99)
HCT VFR BLD AUTO: 38.8 % (ref 40–53)
HGB BLD-MCNC: 12.7 G/DL (ref 13.3–17.7)
IMM GRANULOCYTES # BLD: 0.1 10E3/UL
IMM GRANULOCYTES NFR BLD: 0 %
LYMPHOCYTES # BLD AUTO: 1.4 10E3/UL (ref 0.8–5.3)
LYMPHOCYTES NFR BLD AUTO: 11 %
MCH RBC QN AUTO: 28.6 PG (ref 26.5–33)
MCHC RBC AUTO-ENTMCNC: 32.7 G/DL (ref 31.5–36.5)
MCV RBC AUTO: 87 FL (ref 78–100)
MONOCYTES # BLD AUTO: 1.4 10E3/UL (ref 0–1.3)
MONOCYTES NFR BLD AUTO: 11 %
NEUTROPHILS # BLD AUTO: 9.5 10E3/UL (ref 1.6–8.3)
NEUTROPHILS NFR BLD AUTO: 78 %
NRBC # BLD AUTO: 0 10E3/UL
NRBC BLD AUTO-RTO: 0 /100
PLATELET # BLD AUTO: 188 10E3/UL (ref 150–450)
POTASSIUM SERPL-SCNC: 4.2 MMOL/L (ref 3.4–5.3)
RBC # BLD AUTO: 4.44 10E6/UL (ref 4.4–5.9)
SODIUM SERPL-SCNC: 135 MMOL/L (ref 136–145)
WBC # BLD AUTO: 12.3 10E3/UL (ref 4–11)

## 2023-01-06 PROCEDURE — 250N000013 HC RX MED GY IP 250 OP 250 PS 637: Performed by: STUDENT IN AN ORGANIZED HEALTH CARE EDUCATION/TRAINING PROGRAM

## 2023-01-06 PROCEDURE — 258N000003 HC RX IP 258 OP 636: Performed by: STUDENT IN AN ORGANIZED HEALTH CARE EDUCATION/TRAINING PROGRAM

## 2023-01-06 PROCEDURE — 250N000011 HC RX IP 250 OP 636

## 2023-01-06 PROCEDURE — 82150 ASSAY OF AMYLASE: CPT | Performed by: SURGERY

## 2023-01-06 PROCEDURE — 83605 ASSAY OF LACTIC ACID: CPT | Performed by: SURGERY

## 2023-01-06 PROCEDURE — 80048 BASIC METABOLIC PNL TOTAL CA: CPT

## 2023-01-06 PROCEDURE — 120N000002 HC R&B MED SURG/OB UMMC

## 2023-01-06 PROCEDURE — 36415 COLL VENOUS BLD VENIPUNCTURE: CPT | Performed by: SURGERY

## 2023-01-06 PROCEDURE — 258N000003 HC RX IP 258 OP 636

## 2023-01-06 PROCEDURE — 36415 COLL VENOUS BLD VENIPUNCTURE: CPT

## 2023-01-06 PROCEDURE — 85025 COMPLETE CBC W/AUTO DIFF WBC: CPT

## 2023-01-06 RX ORDER — ENOXAPARIN SODIUM 100 MG/ML
40 INJECTION SUBCUTANEOUS EVERY 24 HOURS
Status: DISCONTINUED | OUTPATIENT
Start: 2023-01-06 | End: 2023-01-09 | Stop reason: HOSPADM

## 2023-01-06 RX ORDER — OXYCODONE HYDROCHLORIDE 5 MG/1
5 TABLET ORAL EVERY 4 HOURS PRN
Qty: 12 TABLET | Refills: 0 | Status: SHIPPED | OUTPATIENT
Start: 2023-01-06

## 2023-01-06 RX ORDER — ACETAMINOPHEN 325 MG/1
650 TABLET ORAL ONCE
Status: DISCONTINUED | OUTPATIENT
Start: 2023-01-06 | End: 2023-01-06

## 2023-01-06 RX ORDER — ONDANSETRON 4 MG/1
4 TABLET, ORALLY DISINTEGRATING ORAL EVERY 6 HOURS PRN
Qty: 6 TABLET | Refills: 0 | Status: SHIPPED | OUTPATIENT
Start: 2023-01-06

## 2023-01-06 RX ADMIN — ACETAMINOPHEN 975 MG: 325 TABLET, FILM COATED ORAL at 00:00

## 2023-01-06 RX ADMIN — OXYCODONE HYDROCHLORIDE 5 MG: 5 TABLET ORAL at 12:48

## 2023-01-06 RX ADMIN — INSULIN ASPART 2 UNITS: 100 INJECTION, SOLUTION INTRAVENOUS; SUBCUTANEOUS at 08:02

## 2023-01-06 RX ADMIN — PANCRELIPASE 4 CAPSULE: 24000; 76000; 120000 CAPSULE, DELAYED RELEASE PELLETS ORAL at 17:15

## 2023-01-06 RX ADMIN — SIMVASTATIN 40 MG: 40 TABLET, FILM COATED ORAL at 22:37

## 2023-01-06 RX ADMIN — SENNOSIDES AND DOCUSATE SODIUM 1 TABLET: 8.6; 5 TABLET ORAL at 20:42

## 2023-01-06 RX ADMIN — ACETAMINOPHEN 975 MG: 325 TABLET, FILM COATED ORAL at 23:34

## 2023-01-06 RX ADMIN — POLYETHYLENE GLYCOL 3350 17 G: 17 POWDER, FOR SOLUTION ORAL at 08:03

## 2023-01-06 RX ADMIN — ACETAMINOPHEN 975 MG: 325 TABLET, FILM COATED ORAL at 08:01

## 2023-01-06 RX ADMIN — PANCRELIPASE 4 CAPSULE: 24000; 76000; 120000 CAPSULE, DELAYED RELEASE PELLETS ORAL at 12:23

## 2023-01-06 RX ADMIN — INSULIN ASPART 1 UNITS: 100 INJECTION, SOLUTION INTRAVENOUS; SUBCUTANEOUS at 00:04

## 2023-01-06 RX ADMIN — ENOXAPARIN SODIUM 40 MG: 40 INJECTION SUBCUTANEOUS at 08:01

## 2023-01-06 RX ADMIN — INSULIN ASPART 2 UNITS: 100 INJECTION, SOLUTION INTRAVENOUS; SUBCUTANEOUS at 20:42

## 2023-01-06 RX ADMIN — LEVOTHYROXINE SODIUM 100 MCG: 100 TABLET ORAL at 08:01

## 2023-01-06 RX ADMIN — SENNOSIDES AND DOCUSATE SODIUM 1 TABLET: 8.6; 5 TABLET ORAL at 08:01

## 2023-01-06 RX ADMIN — SODIUM CHLORIDE, POTASSIUM CHLORIDE, SODIUM LACTATE AND CALCIUM CHLORIDE: 600; 310; 30; 20 INJECTION, SOLUTION INTRAVENOUS at 04:46

## 2023-01-06 RX ADMIN — INSULIN ASPART 2 UNITS: 100 INJECTION, SOLUTION INTRAVENOUS; SUBCUTANEOUS at 17:15

## 2023-01-06 RX ADMIN — INSULIN ASPART 1 UNITS: 100 INJECTION, SOLUTION INTRAVENOUS; SUBCUTANEOUS at 04:43

## 2023-01-06 RX ADMIN — ACETAMINOPHEN 975 MG: 325 TABLET, FILM COATED ORAL at 15:42

## 2023-01-06 RX ADMIN — SODIUM CHLORIDE, POTASSIUM CHLORIDE, SODIUM LACTATE AND CALCIUM CHLORIDE 1000 ML: 600; 310; 30; 20 INJECTION, SOLUTION INTRAVENOUS at 16:22

## 2023-01-06 RX ADMIN — INSULIN ASPART 1 UNITS: 100 INJECTION, SOLUTION INTRAVENOUS; SUBCUTANEOUS at 12:22

## 2023-01-06 RX ADMIN — PANCRELIPASE 4 CAPSULE: 24000; 76000; 120000 CAPSULE, DELAYED RELEASE PELLETS ORAL at 08:02

## 2023-01-06 ASSESSMENT — ACTIVITIES OF DAILY LIVING (ADL)
ADLS_ACUITY_SCORE: 20

## 2023-01-06 NOTE — PROGRESS NOTES
General Surgery Crosscover Postop Check     Subjective:   POD#1 S/p  diagnostic laparoscopy pancreatic necrosectomy.   Resting comfortably in bed, endorses moderate pain, improving with scheduled and prn pain medications.   Denies CP, SOB. Denies nausea or vomiting. Tolerating CLD.      Objective:   Vital signs:  B/P: 118/72, T: 99, P: 80, R: 18    Intake/Output Summary (Last 24 hours) at 1/6/2023 1221  Last data filed at 1/6/2023 0955  Gross per 24 hour   Intake 2957.25 ml   Output 1515 ml   Net 1442.25 ml     Drain: LUQ 55/10 mL             LLQ 90/30 mL   Physical exam:   General: Resting comfortably in bed, NAD. Conversing easily.   CV: RRR.   Pulmonary: Nonlabored breathing on room air. No crackles or wheezes.   Abdomen: Soft, appropriately tender to palpation. Nondistended. ADDISON drains in place to bulb suction, sanguinous drainage. Incisions c/d/I without surrounding erythema or discharge.   Neuro: Alert and oriented x3. CN II-XII grossly intact. Moves all extremities.   Extremities: WWP. No pitting edema in bilateral lower extremities. No rashes or ecchymoses.       A&P:  Pt is a 63 y/o male who is now POD1 s/p diagnostic laparoscopy and pancreatic necrosectomy. Progressing appropriately postop, with adequate UOP, well controlled pain, tolerating diet w/o nausea or vomiting.    Plan      -Pain:  Multimodal pain control   - GI/Diet: advance to RD   - DVT ppx: Start Lovenox 40 mg Q24hrs   - Endocrine: Cont insulin regimen   - Activity: Ambulate as tolerated  - Drain: ADDISON drain care per unit protocol   - Dispo: Home tomorrow      Zandra Strickland MD  Surgery resident, PGY-1  [facility]

## 2023-01-06 NOTE — DISCHARGE SUMMARY
[facility]  Discharge Summary  General Surgery     Vernon Ortiz MRN# [id number]   YOB: 1960 Age: 62 year old     Date of Admission:  1/5/2023  Date of Discharge::  1/9/2023 12:16 PM  Admitting Physician:  Paulo Martinez MD  Discharge Physician:  Paulo Martinez MD  Primary Care Physician:        Hardeep Dawson          Admission Diagnoses:   Necrotizing pancreatitis [K85.91]            Discharge Diagnosis:   Same as above         Procedures:     1/5/23 LAPAROSCOPY, DIAGNOSTIC, BY GENERAL SURGERY, PANCREAS NECROSECTOMY          Consultations:   ENDOCRINE DIABETES ADULT IP CONSULT  ENDOCRINE DIABETES ADULT IP CONSULT  NURSING TO CONSULT FOR VASCULAR ACCESS CARE IP CONSULT          Brief History of Illness:   Vernon Ortiz is a 62 year old male with history of hypothyroidism and HLD, and necrotizing pancreatitis that had been managed by IR drain placement back in October 2022. An endoscopic attempt to access the collection on 12/8/22 was unsuccessful and he was admitted for the above mentioned procedure by general surgery.         Hospital Course:     The patient underwent diagnostic laparoscopy and pancreatic necrosectomy on 01/05/2023 which he tolerated well without immediate complications. He was transferred to the PACU and then floor for routine postoperative care. The remainder of his postoperative course was largely unremarkable. Hare was removed on POD#0. His diet was slowly advanced. On 1/7 his blood glucose became more difficult to control, requiring insulin gtt. Endocrinology was consulted for assistance with post-operative hyperglycemia management with appropriate adjustments made to insulin regimen for discharge. On the day of discharge, he was tolerating a regular diet, his pain was well controlled with oral pain medications, he was voiding spontaneously, and ambulating independently. Patient will follow up with his PCP, endocrinology, and general surgery  as an outpatient.          Imaging Studies:     Results for orders placed or performed during the hospital encounter of 12/08/22   XR Surgery FRANKO Fluoro Less Than 5 Min w Stills    Narrative    This exam was marked as non-reportable because it will not be read by a   radiologist or a [facility] non-radiologist provider.                    Medications Prior to Admission:     Medications Prior to Admission   Medication Sig Dispense Refill Last Dose     acetaminophen (TYLENOL) 500 MG tablet Take 500-1,000 mg by mouth every 6 hours as needed for mild pain   More than a month     amylase-lipase-protease (CREON 24) 92423-07048 units CPEP per EC capsule Take 4 capsules by mouth 3 times daily (with meals) 360 capsule 0 1/4/2023 at 1730     amylase-lipase-protease (CREON 24) 40154-96039 units CPEP per EC capsule Take 2 capsules by mouth Take with snacks or supplements (pancreatic insufficiency) 60 capsule 0 More than a month     blood glucose (NO BRAND SPECIFIED) test strip Use to test blood sugar with meals and nightly or as directed. 500 strip 1 Past Month     blood glucose monitoring (ACCU-CHEK MULTICLIX) lancets Use to test blood sugar with meals and nightly or as directed. 204 each 1 Past Month     blood glucose monitoring (NO BRAND SPECIFIED) meter device kit Use to test blood sugar with meals and nightly or as directed. 1 kit 0 Past Month     insulin aspart (NOVOLOG PEN) 100 UNIT/ML pen Inject 1 Units Subcutaneous Take with snacks or supplements for high blood sugar 15 mL 0 Past Month     insulin aspart (NOVOLOG PEN) 100 UNIT/ML pen Inject 1-10 Units Subcutaneous 3 times daily (before meals) 60 mL 0 Past Month     insulin aspart (NOVOLOG PEN) 100 UNIT/ML pen Inject 1-7 Units Subcutaneous At Bedtime 15 mL 0 Past Month     insulin pen needle (32G X 4 MM) 32G X 4 MM miscellaneous Use pen needles daily or as directed. 90 each 1 Past Week     levothyroxine (SYNTHROID/LEVOTHROID) 100 MCG tablet Take 100 mcg by mouth every  morning   1/5/2023 at 0500     Sharps Container MISC 1 Box 4 times daily (with meals and nightly) 1 each 0 Past Week     simvastatin (ZOCOR) 40 MG tablet Take 40 mg by mouth At Bedtime   1/4/2023 at 2230     sodium chloride, PF, 0.9% PF flush Irrigate with 10 mLs as directed every 12 hours 500 mL 0 1/4/2023 at 2230     [DISCONTINUED] insulin aspart (NOVOLOG PEN) 100 UNIT/ML pen Inject 1 Units Subcutaneous 3 times daily (with meals) (Patient taking differently: Inject Subcutaneous 3 times daily (with meals) 1 unit per 15 carb) 15 mL 0 Past Month     [DISCONTINUED] insulin glargine (LANTUS PEN) 100 UNIT/ML pen Inject 50 Units Subcutaneous every evening (Patient taking differently: Inject 15 Units Subcutaneous every evening Takes at 4pm) 60 mL 0 1/4/2023 at 1600              Discharge Medications:     Discharge Medication List as of 1/9/2023 11:03 AM      START taking these medications    Details   ondansetron (ZOFRAN ODT) 4 MG ODT tab Take 1 tablet (4 mg) by mouth every 6 hours as needed for nausea or vomiting, Disp-6 tablet, R-0, E-Prescribe      oxyCODONE (ROXICODONE) 5 MG tablet Take 1 tablet (5 mg) by mouth every 4 hours as needed for breakthrough pain, Disp-12 tablet, R-0, Local Print         CONTINUE these medications which have CHANGED    Details   !! insulin aspart (NOVOLOG PEN) 100 UNIT/ML pen Inject 1 Units Subcutaneous 3 times daily (with meals) 1 unit per 5 carb, Disp-3 mL, R-0, E-Prescribe      !! insulin aspart (NOVOLOG PEN) 100 UNIT/ML pen Inject 1-7 Units Subcutaneous At Bedtime, Disp-15 mL, R-0, E-Prescribe      insulin glargine (LANTUS PEN) 100 UNIT/ML pen Inject 55 Units Subcutaneous every 24 hours, Disp-15 mL, R-0, E-PrescribeIf Lantus is not covered by insurance, may substitute Basaglar or Semglee or other insulin glargine product per insurance preference at same dose and frequency.         !! - Potential duplicate medications found. Please discuss with provider.      CONTINUE these medications  which have NOT CHANGED    Details   acetaminophen (TYLENOL) 500 MG tablet Take 500-1,000 mg by mouth every 6 hours as needed for mild pain, Historical      !! amylase-lipase-protease (CREON 24) 32331-48298 units CPEP per EC capsule Take 4 capsules by mouth 3 times daily (with meals), Disp-360 capsule, R-0, E-Prescribe      !! amylase-lipase-protease (CREON 24) 96417-76202 units CPEP per EC capsule Take 2 capsules by mouth Take with snacks or supplements (pancreatic insufficiency), Disp-60 capsule, R-0, E-Prescribe      blood glucose (NO BRAND SPECIFIED) test strip Use to test blood sugar with meals and nightly or as directed., Disp-500 strip, R-1, E-Prescribe      blood glucose monitoring (ACCU-CHEK MULTICLIX) lancets Use to test blood sugar with meals and nightly or as directed.Disp-204 each, [id number]      blood glucose monitoring (NO BRAND SPECIFIED) meter device kit Use to test blood sugar with meals and nightly or as directed.Disp-1 kit, [id number]      !! insulin aspart (NOVOLOG PEN) 100 UNIT/ML pen Inject 1 Units Subcutaneous Take with snacks or supplements for high blood sugar, Disp-15 mL, R-0, E-PrescribeTake 1 unit per 5 grams of carbohydrates with meals and snacks      insulin pen needle (32G X 4 MM) 32G X 4 MM miscellaneous Use pen needles daily or as directed.Disp-90 each, [id number]      levothyroxine (SYNTHROID/LEVOTHROID) 100 MCG tablet Take 100 mcg by mouth every morning, Historical      Sharps Container MISC 1 Box 4 times daily (with meals and nightly), Disp-1 each, R-0, E-Prescribe      simvastatin (ZOCOR) 40 MG tablet Take 40 mg by mouth At Bedtime, Historical      sodium chloride, PF, 0.9% PF flush Irrigate with 10 mLs as directed every 12 hours, Disp-500 mL, R-0, E-PrescribeIrrigate drain with 10mL of normal saline twice daily       !! - Potential duplicate medications found. Please discuss with provider.                  Medications Discontinued or Adjusted During This  Hospitalization:   Blood glucose monitoring and insulin dosing (per Endocrinology recs 1/9):             -blood glucose monitoring three times daily before meals and at bedtime or resume your CGM  -Glargine (Lantus, or Basaglar, per insurance coverage) 55 units daily in the 1300  -Aspart (Novolog or Humalog, per insurance coverage): 1:5 g CHO with meals and snacks  -Aspart (Novolog or Humalog, per insurance coverage): 2/30 intensity sliding scale (see below)     **As discussed and per your experience last time, if your AM fasting BG is <120 when you wake can start to reduce by 20%   To refresh it is the current dose (so for example 55 unit(s) x .20 = 11, so reduce to 44 unit(s).      Novolog 1 unit(s) per 5 grams of carbohydrate.   If the long-acting changes then to 44 unit(s) the meal insulin would change to 1 unit(s) per 6 grams of carbohydrates     Novolog custom scale 2/30 TID AC/HS  ISF 15   2 per 30 >/= 140  -169 give 2 units.  -199 give 4 units.  -229 give 6 units.  -259 give 8 units.  -289 give 10 units.  -319 give 12 units.  -349 give 14 units.  BG >/= 350 give 16 units.     -229 give 2 units.  -259 give 4 units.  -289 give 6 units.  -319 give 8 units.  -349 give 10 units.  BG >/= 350 give 12 units.         Antibiotics Prescribed at Discharge:   None prescribed           Day of Discharge Physical Exam:   Temp:  [96.1  F (35.6  C)-99.8  F (37.7  C)] 96.1  F (35.6  C)  Pulse:  [79-92] 79  Resp:  [16] 16  BP: (130-138)/(81-87) 138/87  SpO2:  [96 %] 96 %    General: Resting comfortably in bed, NAD. Conversing easily.   CV: RRR.   Pulmonary: Nonlabored breathing on room air. No audible wheezing.   Abdomen: Soft, appropriately tender to palpation. Nondistended. ADDISON drains in place to bulb suction, serosanguinous drainage. Incisions c/d/I without surrounding erythema or discharge.   Neuro: Alert and oriented x3. Moves all extremities.    Extremities: WWP.            Final Pathology Result:   None       Discharge Instructions and Follow-Up:     Discharge Procedure Orders   Activity   Order Comments: Your activity upon discharge: activity as tolerated, no lifting > 15 pounds, or strenuous exercise for 4weeks, and no driving while on analgesics     Order Specific Question Answer Comments   Is discharge order? Yes      Discharge Instructions     Adult [facility] Follow-up and recommended labs and tests   Order Comments: You will receive a phone call from general surgery clinic regarding your follow up appointment     Appointments on [place] and/or [facility] (with [facility] or [facility] provider or service). Call [phone] if you haven't heard regarding these appointments within 7 days of discharge.     Reason for your hospital stay   Order Comments: Diagnostic laparoscopy, necrosectomy     Tubes and drains   Order Comments: You are going home with the following tubes or drains: ADDISON drain. Strip drain daily. Empty drain and record output daily.     Diet   Order Comments: Follow this diet upon discharge: Orders Placed This Encounter      Regular Diet Adult     Order Specific Question Answer Comments   Is discharge order? Yes               Home Health Care:     Not needed           Discharge Disposition:     Discharged to home      Condition at discharge: Stable    Patient was seen, examined, and discussed on day of discharge with chief resident, who discussed with staff surgeon Dr. Martinez.     Tsering Chavarria MD  General Surgery PGY-2

## 2023-01-06 NOTE — PLAN OF CARE
Vitals:    01/05/23 2349 01/06/23 0132 01/06/23 0419 01/06/23 0908   BP: 129/74 118/80 115/70 118/72   BP Location: Left arm Left arm Left arm Left arm   Pulse: 90 84 81 80   Resp: 18 18 18 18   Temp: (!) 96.4  F (35.8  C)  98.5  F (36.9  C) 99  F (37.2  C)   TempSrc: Oral  Oral Oral   SpO2: 95% 95% 95% 97%   Weight:       Height:           Neuro: alert and oriented     VS: febrile  vitally stable sating 97% on room air, non labor breathing     BG: sliding scale     Cardiac: 80    Pain/Nausea: denies nausea, minimal pain,     Lines/Drains: Two PIV SL, LR bolus x 1, J,P x 2     Incision/Wound: midline incision primapore intact     GI/: voiding adequate, audible BS, no BM    Diet/Appetite: regular diet, tolerating good     Activity: up independently ambulated down the whalen.    Plan:  continue with plan of care.

## 2023-01-06 NOTE — PROGRESS NOTES
Admitted/transferred from: PACU  2 RN skin assessment completed by Truong Funk, RN and Juve Martinez, RN.  Skin assessment finding: midline incision covered with primapore dressing, scant drainage is marked, (L) JPx2, bilateral heels dried skin, no other skin deficits noted.   Interventions/actions: Activity encouraged out of bed, repositioning encourage, maintain adequate hydration and preventative mepilex on coccyx.     Bedside Emergency Equipment Present:  Suction Regulator: YES  Suction Canister: YES  Tubing between Regulator and Canister: YES  O2 Regulator with Tree: YES  Ambu Bag: YES

## 2023-01-06 NOTE — PROGRESS NOTES
"General Surgery Crosscover Postop Check     Subjective:   Pt is a 61 y/o male now POD0 from diagnostic laparoscopy pancreatic necrosectomy. Resting comfortably in bed, endorses moderate pain, improving with scheduled and prn pain medications. Denies CP, SOB. Denies nausea or vomiting. Tolerating CLD.      Objective:   Vital signs:  Temp: 98.5  F (36.9  C) Temp src: Oral BP: 115/70 Pulse: 81   Resp: 18 SpO2: 95 % O2 Device: None (Room air) Oxygen Delivery: 2 LPM Height: 180.3 cm (5' 11\") Weight: 92.4 kg (203 lb 11.3 oz)  Estimated body mass index is 28.41 kg/m  as calculated from the following:    Height as of this encounter: 1.803 m (5' 11\").    Weight as of this encounter: 92.4 kg (203 lb 11.3 oz).    Physical exam:   General: Resting comfortably in bed, NAD. Conversing easily.   CV: RRR.   Pulmonary: Nonlabored breathing on room air. No crackles or wheezes.   Abdomen: Soft, appropriately tender to palpation. Nondistended. ADDISON drains in place to bulb suction, sanguinous drainage. Incisions c/d/I without surrounding erythema or discharge.   Neuro: Alert and oriented x3. CN II-XII grossly intact. Moves all extremities.   Extremities: WWP. No pitting edema in bilateral lower extremities. No rashes or ecchymoses.     Pertinent labs:   - postop Cr 0.9  - postop platelet count 184    A&P:  Pt is a 61 y/o male who is now POD0 s/p diagnostic laparoscopy and pancreatic necrosectomy. Progressing appropriately postop, with adequate UOP, well controlled pain, tolerating diet w/o nausea or vomiting.    - Multimodal pain control   - Diet: CLD  - Rest of cares per primary team postop orders  - Please page with further questions or concerns.     Alvaro Bowie MD PGY1  General Surgery Resident     "

## 2023-01-06 NOTE — PLAN OF CARE
Goal Outcome Evaluation:      Plan of Care Reviewed With: patient    Overall Patient Progress: improving    Time: 1930-0730  Status: POD #1 s/p LAPAROSCOPY, DIAGNOSTIC, BY GENERAL SURGERY, PANCREAS NECROSECTOMY  Neuros: A&Ox4. CMS intact.   Cardiac: WNL, denies cardiac chest pain   Respiratory: WNL on RA, denies SOB   Pain: Rates abdominal/incisional pain 3/10, pt only wanted to take scheduled tylenol, declined needing prns.   Nausea: Denies nausea.   Diet: CLD  GI/: Voiding spontaneously. Hypo BS, no BM this shift.   Skin/incisions:midline incision covered with primapore dressing, scant drainage is marked-no change, heels dried skin, no other skin deficits noted.   Drains: (L) JPx2, bilateral   Lines: L PIV infusing LR @ 75 ml/hr; R PIV SL.   Labs: BG rage 153-185; insulin per sliding scale   Activity: Activity encouraged, up ambulated to BR with SBA.   New Changes this Shift: None   Plan: Continue POC.

## 2023-01-06 NOTE — PHARMACY-ADMISSION MEDICATION HISTORY
Admission Medication History Completed by Pharmacy    See [facility] Admission Navigator for allergy information, preferred outpatient pharmacy, prior to admission medications and immunization status.     Medication History Sources:     Patient    Surescripts dispense report     Changes made to PTA medication list (reason):    Added:   o Insulin glargine (previous entry was deleted)    Deleted:   o none    Changed: None    Additional Information:    Pt manages his own meds and is a reliable historian     Pt confirmed NKDA    Pt was able to appropriately articulate his insulin regimen: he has been doing only carb coverage lately; he technically had a sliding scale regimen as well, but has not had to utilize it    Prior to Admission medications    Medication Sig Last Dose Taking? Auth Provider Long Term End Date   acetaminophen (TYLENOL) 500 MG tablet Take 500-1,000 mg by mouth every 6 hours as needed for mild pain Past Month Yes Unknown, Entered By History     amylase-lipase-protease (CREON 24) 17949-87603 units CPEP per EC capsule Take 4 capsules by mouth 3 times daily (with meals) 1/4/2023 at 1730 Yes Estela Friend PA-C     amylase-lipase-protease (CREON 24) 86571-18392 units CPEP per EC capsule Take 2 capsules by mouth Take with snacks or supplements (pancreatic insufficiency) Past Week Yes Estela Friend PA-C     insulin aspart (NOVOLOG PEN) 100 UNIT/ML pen Inject 1 Units Subcutaneous Take with snacks or supplements for high blood sugar Past Week Yes Estela Friend PA-C Yes    insulin aspart (NOVOLOG PEN) 100 UNIT/ML pen Inject 1-10 Units Subcutaneous 3 times daily (before meals) Past Month Yes Estela Friend PA-C Yes    insulin aspart (NOVOLOG PEN) 100 UNIT/ML pen Inject 1-7 Units Subcutaneous At Bedtime Past Month Yes Estela Friend PA-C Yes    insulin glargine (LANTUS PEN) 100 UNIT/ML pen Inject 15 Units Subcutaneous daily at 4pm 1/4/2023 at 1600 Yes Unknown, Entered By History Yes    levothyroxine  (SYNTHROID/LEVOTHROID) 100 MCG tablet Take 100 mcg by mouth every morning 1/5/2023 at 0500 Yes Unknown, Entered By History Yes    Sharps Container MISC 1 Box 4 times daily (with meals and nightly) Past Week Yes Estela Friend PA-C     simvastatin (ZOCOR) 40 MG tablet Take 40 mg by mouth At Bedtime 1/4/2023 at 2230 Yes Unknown, Entered By History Yes    sodium chloride, PF, 0.9% PF flush Irrigate with 10 mLs as directed every 12 hours 1/4/2023 at 2230 Yes Estela Friend PA-C     blood glucose (NO BRAND SPECIFIED) test strip Use to test blood sugar with meals and nightly or as directed.   Estela Friend PA-C     blood glucose monitoring (ACCU-CHEK MULTICLIX) lancets Use to test blood sugar with meals and nightly or as directed.   Estela Friend PA-C     blood glucose monitoring (NO BRAND SPECIFIED) meter device kit Use to test blood sugar with meals and nightly or as directed.   Estela Friend PA-C     insulin pen needle (32G X 4 MM) 32G X 4 MM miscellaneous Use pen needles daily or as directed.   Estela Friend PA-C          Date completed: 01/06/23    Medication history completed by:   Nolberto Castaneda, Mejia, BCPS

## 2023-01-07 ENCOUNTER — APPOINTMENT (OUTPATIENT)
Dept: GENERAL RADIOLOGY | Facility: CLINIC | Age: 63
End: 2023-01-07
Attending: SURGERY
Payer: COMMERCIAL

## 2023-01-07 LAB
ALBUMIN UR-MCNC: 30 MG/DL
APPEARANCE UR: CLEAR
BILIRUB UR QL STRIP: NEGATIVE
COLOR UR AUTO: ABNORMAL
ERYTHROCYTE [DISTWIDTH] IN BLOOD BY AUTOMATED COUNT: 13.2 % (ref 10–15)
GLUCOSE BLDC GLUCOMTR-MCNC: 136 MG/DL (ref 70–99)
GLUCOSE BLDC GLUCOMTR-MCNC: 177 MG/DL (ref 70–99)
GLUCOSE BLDC GLUCOMTR-MCNC: 196 MG/DL (ref 70–99)
GLUCOSE BLDC GLUCOMTR-MCNC: 216 MG/DL (ref 70–99)
GLUCOSE BLDC GLUCOMTR-MCNC: 229 MG/DL (ref 70–99)
GLUCOSE BLDC GLUCOMTR-MCNC: 229 MG/DL (ref 70–99)
GLUCOSE BLDC GLUCOMTR-MCNC: 230 MG/DL (ref 70–99)
GLUCOSE BLDC GLUCOMTR-MCNC: 238 MG/DL (ref 70–99)
GLUCOSE BLDC GLUCOMTR-MCNC: 273 MG/DL (ref 70–99)
GLUCOSE BLDC GLUCOMTR-MCNC: 280 MG/DL (ref 70–99)
GLUCOSE BLDC GLUCOMTR-MCNC: 282 MG/DL (ref 70–99)
GLUCOSE UR STRIP-MCNC: 30 MG/DL
HCT VFR BLD AUTO: 41.1 % (ref 40–53)
HGB BLD-MCNC: 12.9 G/DL (ref 13.3–17.7)
HGB UR QL STRIP: NEGATIVE
KETONES UR STRIP-MCNC: 40 MG/DL
LACTATE SERPL-SCNC: 0.9 MMOL/L (ref 0.7–2)
LEUKOCYTE ESTERASE UR QL STRIP: NEGATIVE
MCH RBC QN AUTO: 28 PG (ref 26.5–33)
MCHC RBC AUTO-ENTMCNC: 31.4 G/DL (ref 31.5–36.5)
MCV RBC AUTO: 89 FL (ref 78–100)
MUCOUS THREADS #/AREA URNS LPF: PRESENT /LPF
NITRATE UR QL: NEGATIVE
PH UR STRIP: 7 [PH] (ref 5–7)
PLATELET # BLD AUTO: 168 10E3/UL (ref 150–450)
RBC # BLD AUTO: 4.61 10E6/UL (ref 4.4–5.9)
RBC URINE: 1 /HPF
SP GR UR STRIP: 1.02 (ref 1–1.03)
UROBILINOGEN UR STRIP-MCNC: NORMAL MG/DL
WBC # BLD AUTO: 16.4 10E3/UL (ref 4–11)
WBC URINE: <1 /HPF

## 2023-01-07 PROCEDURE — 85027 COMPLETE CBC AUTOMATED: CPT

## 2023-01-07 PROCEDURE — 258N000003 HC RX IP 258 OP 636

## 2023-01-07 PROCEDURE — 81001 URINALYSIS AUTO W/SCOPE: CPT

## 2023-01-07 PROCEDURE — 71045 X-RAY EXAM CHEST 1 VIEW: CPT

## 2023-01-07 PROCEDURE — 250N000009 HC RX 250: Performed by: NURSE PRACTITIONER

## 2023-01-07 PROCEDURE — 250N000011 HC RX IP 250 OP 636

## 2023-01-07 PROCEDURE — 71045 X-RAY EXAM CHEST 1 VIEW: CPT | Mod: 26 | Performed by: RADIOLOGY

## 2023-01-07 PROCEDURE — 120N000002 HC R&B MED SURG/OB UMMC

## 2023-01-07 PROCEDURE — 250N000013 HC RX MED GY IP 250 OP 250 PS 637: Performed by: STUDENT IN AN ORGANIZED HEALTH CARE EDUCATION/TRAINING PROGRAM

## 2023-01-07 PROCEDURE — 87040 BLOOD CULTURE FOR BACTERIA: CPT

## 2023-01-07 PROCEDURE — 258N000003 HC RX IP 258 OP 636: Performed by: NURSE PRACTITIONER

## 2023-01-07 PROCEDURE — 99207 PR NO BILLABLE SERVICE THIS VISIT: CPT | Performed by: NURSE PRACTITIONER

## 2023-01-07 PROCEDURE — 36415 COLL VENOUS BLD VENIPUNCTURE: CPT

## 2023-01-07 RX ORDER — SODIUM CHLORIDE 9 MG/ML
INJECTION, SOLUTION INTRAVENOUS CONTINUOUS
Status: DISCONTINUED | OUTPATIENT
Start: 2023-01-07 | End: 2023-01-09 | Stop reason: HOSPADM

## 2023-01-07 RX ADMIN — ACETAMINOPHEN 975 MG: 325 TABLET, FILM COATED ORAL at 15:37

## 2023-01-07 RX ADMIN — HUMAN INSULIN 2.5 UNITS/HR: 100 INJECTION, SOLUTION SUBCUTANEOUS at 18:30

## 2023-01-07 RX ADMIN — PANCRELIPASE 4 CAPSULE: 24000; 76000; 120000 CAPSULE, DELAYED RELEASE PELLETS ORAL at 09:02

## 2023-01-07 RX ADMIN — INSULIN ASPART 2 UNITS: 100 INJECTION, SOLUTION INTRAVENOUS; SUBCUTANEOUS at 00:37

## 2023-01-07 RX ADMIN — ACETAMINOPHEN 975 MG: 325 TABLET, FILM COATED ORAL at 09:02

## 2023-01-07 RX ADMIN — SENNOSIDES AND DOCUSATE SODIUM 1 TABLET: 8.6; 5 TABLET ORAL at 20:08

## 2023-01-07 RX ADMIN — SIMVASTATIN 40 MG: 40 TABLET, FILM COATED ORAL at 21:45

## 2023-01-07 RX ADMIN — POLYETHYLENE GLYCOL 3350 17 G: 17 POWDER, FOR SOLUTION ORAL at 09:02

## 2023-01-07 RX ADMIN — PANCRELIPASE 4 CAPSULE: 24000; 76000; 120000 CAPSULE, DELAYED RELEASE PELLETS ORAL at 13:58

## 2023-01-07 RX ADMIN — HUMAN INSULIN 3.5 UNITS/HR: 100 INJECTION, SOLUTION SUBCUTANEOUS at 15:11

## 2023-01-07 RX ADMIN — SODIUM CHLORIDE, PRESERVATIVE FREE: 5 INJECTION INTRAVENOUS at 14:05

## 2023-01-07 RX ADMIN — SODIUM CHLORIDE, POTASSIUM CHLORIDE, SODIUM LACTATE AND CALCIUM CHLORIDE 500 ML: 600; 310; 30; 20 INJECTION, SOLUTION INTRAVENOUS at 00:39

## 2023-01-07 RX ADMIN — HUMAN INSULIN 3.5 UNITS/HR: 100 INJECTION, SOLUTION SUBCUTANEOUS at 16:25

## 2023-01-07 RX ADMIN — INSULIN ASPART 2 UNITS: 100 INJECTION, SOLUTION INTRAVENOUS; SUBCUTANEOUS at 09:03

## 2023-01-07 RX ADMIN — LEVOTHYROXINE SODIUM 100 MCG: 100 TABLET ORAL at 09:02

## 2023-01-07 RX ADMIN — INSULIN ASPART 2 UNITS: 100 INJECTION, SOLUTION INTRAVENOUS; SUBCUTANEOUS at 04:52

## 2023-01-07 RX ADMIN — SENNOSIDES AND DOCUSATE SODIUM 1 TABLET: 8.6; 5 TABLET ORAL at 09:02

## 2023-01-07 RX ADMIN — PANCRELIPASE 4 CAPSULE: 24000; 76000; 120000 CAPSULE, DELAYED RELEASE PELLETS ORAL at 17:30

## 2023-01-07 RX ADMIN — ENOXAPARIN SODIUM 40 MG: 40 INJECTION SUBCUTANEOUS at 09:02

## 2023-01-07 RX ADMIN — HUMAN INSULIN 2.5 UNITS/HR: 100 INJECTION, SOLUTION SUBCUTANEOUS at 17:22

## 2023-01-07 ASSESSMENT — ACTIVITIES OF DAILY LIVING (ADL)
ADLS_ACUITY_SCORE: 20

## 2023-01-07 NOTE — CONSULTS
"Brief Diabetes Note:    HPI: per primary team \" Vernon Ortiz is a 62 year old male with history of hypothyroidism and HLD, and necrotizing pancreatitis that was managed by drain placement with IR team back in October 2022. An endoscopic EUS attempt to access the collection was unsuccessful and was admitted for the above mentioned procedure     The patient underwent diagnostic laparoscopy and pancreatic necrosectomy. On 01/05/2023 which he tolerated well without immediate complications. He was transferred to the PACU and then floor for routine postoperative care. The remainder of his postoperative course was unremarkable\".    IDS has seen patient when he presented previously 10/26/2022 - newly diagnosed at the onset of his initial injury of nec pancreatitis, requiring insulin.    Routine consult placed for:  \"glucose level control\"  Ordering provider: Zandra Strickland MD    Patient being managed with medium resistance sliding scale insulin q4h  received Dexamethasone 6 mg intraop on 1/05/2023.     BG trend:      /68 (BP Location: Left arm)   Pulse 94   Temp 99.4  F (37.4  C) (Oral)   Resp 18   Ht 1.803 m (5' 11\")   Wt 92.4 kg (203 lb 11.3 oz)   SpO2 95%   BMI 28.41 kg/m      CBC RESULTS: Recent Labs   Lab Test 01/07/23  0101   WBC 16.4*   RBC 4.61   HGB 12.9*   HCT 41.1   MCV 89   MCH 28.0   MCHC 31.4*   RDW 13.2        Recent Labs   Lab Test 01/07/23  0858 01/07/23  0357 01/06/23  0759 01/06/23  0746 01/06/23  0003 01/05/23  2043 12/08/22  1736 12/08/22  1012   NA  --   --   --  135*  --   --   --  138   POTASSIUM  --   --   --  4.2  --   --   --  4.4   CHLORIDE  --   --   --  102  --   --   --  103   CO2  --   --   --  21*  --   --   --  23   ANIONGAP  --   --   --  12  --   --   --  12   * 216*   < > 187*   < >  --    < > 118*   BUN  --   --   --  11.8  --   --   --  16.3   CR  --   --   --  0.84  --  0.90  --  0.89   DOLORES  --   --   --  8.8  --   --   --  9.5    < > = values in this " interval not displayed.         Assessment:      -  New onset of insulin dependent diabetes 2/2 pancreatogenic injury/necrotizing pancreatitis    -  Hyperglycemia 2/2 surgery/steroids/stress/insufficient insulin regimen    -  Anemia         Recommendations/Plan:      -  Start IV insulin - is unclear what his insulin needs will be    -  Start meal insulin 1:16 g cho - regular diet has been advanced    -  BG per IV insulin protocol q1-2 hours    -  Hypoglycemia protocol    -  Cho counting and cho consistent diet    -  Education:  TBD    -  Test claim: TBD    -  Outpatient follow up:  rec [facility] vs PCP      -  Full consult to follow in AM       CHRISTIAN Johnson CNP   Inpatient Diabetes Management Service  Pager - [phone]  Friday - Monday 0800 - 1600 hrs    To contact Endocrine Diabetes service:   From 8AM-4PM: page inpatient diabetes provider that is following the patient that day (see filed or incomplete progress notes/consult notes).  If uncertain of provider assignment: page job code [phone].  For questions or updates from 4PM-8AM: page the diabetes job code for on call fellow: [phone]    Please notify inpatient diabetes service if changes are planned to steroids, nutrition, or if procedures are planned requiring prolonged NPO status.Diabetes Management Team job code: [phone]

## 2023-01-07 NOTE — PROGRESS NOTES
General Surgery Crosscover Postop Check     Subjective:   POD#2 S/p  diagnostic laparoscopy pancreatic necrosectomy.   Febrile overnight T.max 102.3 , Tachycardia 106, BP wnl, WBC 16,00, glu 200s   Resting comfortably in bed, endorses moderate pain, improving with scheduled and prn pain medications.   Denies CP, SOB. Denies nausea or vomiting. Tolerating RD.      Objective:   Vital signs:  B/P: 118/72, T: 99, P: 80, R: 18    Intake/Output Summary (Last 24 hours) at 1/6/2023 1221  Last data filed at 1/6/2023 0955  Gross per 24 hour   Intake 2957.25 ml   Output 1515 ml   Net 1442.25 ml     Drain: LUQ 55/10 mL             LLQ 90/30 mL   Physical exam:   General: Resting comfortably in bed, NAD. Conversing easily.   CV: RRR.   Pulmonary: Nonlabored breathing on room air. No crackles or wheezes.   Abdomen: Soft, appropriately tender to palpation. Nondistended. ADDISON drains in place to bulb suction, sanguinous drainage. Incisions c/d/I without surrounding erythema or discharge.   Neuro: Alert and oriented x3. CN II-XII grossly intact. Moves all extremities.   Extremities: WWP. No pitting edema in bilateral lower extremities. No rashes or ecchymoses.     Image:   Preliminary read  Study Result    Narrative & Impression                                                                    RESIDENT PRELIMINARY INTERPRETATION  Impression: Low lung volumes with mild streaky perihilar and bibasilar  opacities favored to represent atelectasis versus edema. No new focal  consolidation.   This result has not been signed. Information might be incomplete.       A&P:  Pt is a 61 y/o male who is now POD2 s/p diagnostic laparoscopy and pancreatic necrosectomy. Progressing appropriately postop, with adequate UOP, well controlled pain, tolerating diet w/o nausea or vomiting.     Patient had T. Max 102.3  with leukocytosis, however, he has been afebrile overnight with normal HR, and hemodynamically stable this morning, in addition, given the  procedure he had and CXR consistent with atelectasis and minor effusion will defer Abx and continue to monitor, if patient spiked fever again > 101.5 will repeat blood culture. Will keep the patient for one more day for observation.     Plan      -Pain:  Multimodal pain control   - GI/Diet: Cont RD   - DVT ppx: Cont Lovenox 40 mg Q24hrs   - Endocrine: Cont insulin regimen, appreciate endocrine team recs   - Activity: Ambulate as tolerated  - Drain: ADDISON drain care per unit protocol   - Dispo: Home tomorrow      Zandra Strickland MD  Surgery resident, PGY-1  [facility]

## 2023-01-07 NOTE — PLAN OF CARE
"/81 (BP Location: Left arm)   Pulse 106   Temp 99.5  F (37.5  C) (Oral)   Resp 20   Ht 1.803 m (5' 11\")   Wt 92.4 kg (203 lb 11.3 oz)   SpO2 92%   BMI 28.41 kg/m      Time: 1930-0730  Status: POD #2 s/p LAPAROSCOPY, DIAGNOSTIC, BY GENERAL SURGERY, PANCREAS NECROSECTOMY  Neuros: A&Ox4. CMS intact.   Cardiac: intermitten tachyness, denies cardiac chest pain   Respiratory: WNL on RA, denies SOB   Pain: pain controlled with oxy x 1 and katty. tylenol  Nausea: Denies nausea.   Diet: Reg  GI/: Voiding spontaneously. Hypo BS, no BM this shift.   Skin/incisions:midline incision covered with primapore dressing, scant drainage is marked-no change, no other skin deficits noted.   Drains: (L) JPx2, bilateral   Lines: L PIV infusing LR @ TKO 10ml/hr AFTER 500 bolus; R PIV SL.   Labs: BG rage 200-230; insulin per sliding scale   Activity: Activity encouraged, up ambulated to BR with SBA.   New Changes this Shift: None   Plan: Continue POC.       Goal Outcome Evaluation:Ongoing                        "

## 2023-01-08 LAB
BACTERIA TISS BX CULT: ABNORMAL
ERYTHROCYTE [DISTWIDTH] IN BLOOD BY AUTOMATED COUNT: 13.1 % (ref 10–15)
GLUCOSE BLDC GLUCOMTR-MCNC: 128 MG/DL (ref 70–99)
GLUCOSE BLDC GLUCOMTR-MCNC: 129 MG/DL (ref 70–99)
GLUCOSE BLDC GLUCOMTR-MCNC: 130 MG/DL (ref 70–99)
GLUCOSE BLDC GLUCOMTR-MCNC: 133 MG/DL (ref 70–99)
GLUCOSE BLDC GLUCOMTR-MCNC: 138 MG/DL (ref 70–99)
GLUCOSE BLDC GLUCOMTR-MCNC: 139 MG/DL (ref 70–99)
GLUCOSE BLDC GLUCOMTR-MCNC: 140 MG/DL (ref 70–99)
GLUCOSE BLDC GLUCOMTR-MCNC: 149 MG/DL (ref 70–99)
GLUCOSE BLDC GLUCOMTR-MCNC: 156 MG/DL (ref 70–99)
GLUCOSE BLDC GLUCOMTR-MCNC: 162 MG/DL (ref 70–99)
GLUCOSE BLDC GLUCOMTR-MCNC: 165 MG/DL (ref 70–99)
GLUCOSE BLDC GLUCOMTR-MCNC: 172 MG/DL (ref 70–99)
GLUCOSE BLDC GLUCOMTR-MCNC: 175 MG/DL (ref 70–99)
GLUCOSE BLDC GLUCOMTR-MCNC: 178 MG/DL (ref 70–99)
GLUCOSE BLDC GLUCOMTR-MCNC: 178 MG/DL (ref 70–99)
GLUCOSE BLDC GLUCOMTR-MCNC: 182 MG/DL (ref 70–99)
HCT VFR BLD AUTO: 42.6 % (ref 40–53)
HGB BLD-MCNC: 13.5 G/DL (ref 13.3–17.7)
MCH RBC QN AUTO: 28 PG (ref 26.5–33)
MCHC RBC AUTO-ENTMCNC: 31.7 G/DL (ref 31.5–36.5)
MCV RBC AUTO: 88 FL (ref 78–100)
PLATELET # BLD AUTO: 217 10E3/UL (ref 150–450)
RBC # BLD AUTO: 4.83 10E6/UL (ref 4.4–5.9)
WBC # BLD AUTO: 16.3 10E3/UL (ref 4–11)

## 2023-01-08 PROCEDURE — 250N000011 HC RX IP 250 OP 636

## 2023-01-08 PROCEDURE — 36415 COLL VENOUS BLD VENIPUNCTURE: CPT

## 2023-01-08 PROCEDURE — 99223 1ST HOSP IP/OBS HIGH 75: CPT | Mod: 24 | Performed by: NURSE PRACTITIONER

## 2023-01-08 PROCEDURE — 120N000002 HC R&B MED SURG/OB UMMC

## 2023-01-08 PROCEDURE — 250N000009 HC RX 250: Performed by: NURSE PRACTITIONER

## 2023-01-08 PROCEDURE — 85027 COMPLETE CBC AUTOMATED: CPT

## 2023-01-08 PROCEDURE — 250N000013 HC RX MED GY IP 250 OP 250 PS 637: Performed by: STUDENT IN AN ORGANIZED HEALTH CARE EDUCATION/TRAINING PROGRAM

## 2023-01-08 RX ADMIN — SENNOSIDES AND DOCUSATE SODIUM 1 TABLET: 8.6; 5 TABLET ORAL at 20:49

## 2023-01-08 RX ADMIN — HUMAN INSULIN 3 UNITS/HR: 100 INJECTION, SOLUTION SUBCUTANEOUS at 09:00

## 2023-01-08 RX ADMIN — POLYETHYLENE GLYCOL 3350 17 G: 17 POWDER, FOR SOLUTION ORAL at 07:50

## 2023-01-08 RX ADMIN — HUMAN INSULIN 4 UNITS/HR: 100 INJECTION, SOLUTION SUBCUTANEOUS at 11:28

## 2023-01-08 RX ADMIN — PANCRELIPASE 4 CAPSULE: 24000; 76000; 120000 CAPSULE, DELAYED RELEASE PELLETS ORAL at 17:52

## 2023-01-08 RX ADMIN — HUMAN INSULIN 3 UNITS/HR: 100 INJECTION, SOLUTION SUBCUTANEOUS at 14:54

## 2023-01-08 RX ADMIN — HUMAN INSULIN 2 UNITS/HR: 100 INJECTION, SOLUTION SUBCUTANEOUS at 08:01

## 2023-01-08 RX ADMIN — PANCRELIPASE 4 CAPSULE: 24000; 76000; 120000 CAPSULE, DELAYED RELEASE PELLETS ORAL at 13:37

## 2023-01-08 RX ADMIN — ACETAMINOPHEN 975 MG: 325 TABLET, FILM COATED ORAL at 00:15

## 2023-01-08 RX ADMIN — ACETAMINOPHEN 975 MG: 325 TABLET, FILM COATED ORAL at 07:50

## 2023-01-08 RX ADMIN — HUMAN INSULIN 4 UNITS/HR: 100 INJECTION, SOLUTION SUBCUTANEOUS at 13:34

## 2023-01-08 RX ADMIN — HUMAN INSULIN 5.5 UNITS/HR: 100 INJECTION, SOLUTION SUBCUTANEOUS at 10:54

## 2023-01-08 RX ADMIN — ENOXAPARIN SODIUM 40 MG: 40 INJECTION SUBCUTANEOUS at 07:50

## 2023-01-08 RX ADMIN — SENNOSIDES AND DOCUSATE SODIUM 1 TABLET: 8.6; 5 TABLET ORAL at 07:50

## 2023-01-08 RX ADMIN — LEVOTHYROXINE SODIUM 100 MCG: 100 TABLET ORAL at 07:50

## 2023-01-08 RX ADMIN — PANCRELIPASE 4 CAPSULE: 24000; 76000; 120000 CAPSULE, DELAYED RELEASE PELLETS ORAL at 07:51

## 2023-01-08 RX ADMIN — SIMVASTATIN 40 MG: 40 TABLET, FILM COATED ORAL at 22:33

## 2023-01-08 ASSESSMENT — ACTIVITIES OF DAILY LIVING (ADL)
ADLS_ACUITY_SCORE: 20
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
## 2023-01-08 NOTE — PROGRESS NOTES
Surgery Progress Note  01/08/2023       Subjective:  - NAOE  - had an episodes of nausea immediately after eating sausage and egg last night at dinner. Has otherwise been tolerating food and not having nausea  - pain controlled, wants to go home     Objective:  Temp:  [98.4  F (36.9  C)-100.5  F (38.1  C)] 98.7  F (37.1  C)  Pulse:  [74-94] 74  Resp:  [16] 16  BP: (125-128)/(75-81) 126/81  SpO2:  [94 %-96 %] 96 %    I/O last 3 completed shifts:  In: 1612 [P.O.:1612]  Out: 400 [Urine:300; Drains:100]      General Appearance: Resting in bed. NAD.  HEENT: sclera non-injected and non-icteric  Respiratory: Breathing comfortably on room air. No increased work of breathing  Cardiovascular: Regular rate  GI: abdomen soft and distended. No tenderness to palpation. Dressing removed over incision and incision is clean dry and intact with staples. Drain with brown pancreatic fluid  Neuro: alert and oriented       Labs:  Recent Labs   Lab 01/07/23  0101 01/06/23  0746 01/05/23 2043   WBC 16.4* 12.3*  --    HGB 12.9* 12.7*  --     188 184       Recent Labs   Lab 01/08/23  0856 01/08/23  0758 01/08/23  0722 01/06/23  0759 01/06/23  0746 01/06/23  0003 01/05/23  2043   NA  --   --   --   --  135*  --   --    POTASSIUM  --   --   --   --  4.2  --   --    CHLORIDE  --   --   --   --  102  --   --    CO2  --   --   --   --  21*  --   --    BUN  --   --   --   --  11.8  --   --    CR  --   --   --   --  0.84  --  0.90   * 139* 130*   < > 187*   < >  --    DOLORES  --   --   --   --  8.8  --   --     < > = values in this interval not displayed.       Imaging:  No new imaging.      Assessment/Plan:   62 year old male with PMH of hypothyroidism, HLD, and necrotizing pancreatitis now  POD3 s/p diagnostic laparoscopy and pancreatic necrosectomy on 1/5/23. Progressing appropriately postop, with adequate UOP, well controlled pain, tolerating diet w/o nausea or vomiting except for one episode of nausea last evening .       Patient  had T. Max 103.2 on 1/6 with leukocytosis to 16.4, however, he has not had fever over 101.5 over last 24 hours and WBC is not increasing. Normal HR, hemodynamically stable, normal lactate, negative blood cultures so far, and CXR consistent with atelectasis and minor effusion. Antibiotics deferred unless patient shows clinical worsening or spikes fever again > 101.5. Would repeat blood culture with fever over 101.5.     Plan       -Pain:  Multimodal pain control   - GI/Diet: Cont RD   - DVT ppx: Cont Lovenox 40 mg Q24hrs   - Endocrine: on insulin drip, appreciate endocrine team recs today  - Activity: Ambulate as tolerated  - Drain: ADDISON drain care per unit protocol   - Dispo: stable to discharge today from surgical standpoint, but pending endocrinology recs     Seen, examined, and discussed with chief resident, who will discuss with staff.  - - - - - - - - - - - - - - - - - -  MD Madalyn Marquez's Family Medicine Resident, PGY-2  Text page on call surgery resident via [facility] Paging/Directory - [facility] Surgery  /[facility]     Awake

## 2023-01-08 NOTE — PLAN OF CARE
Vitals:    01/07/23 0814 01/07/23 1345 01/07/23 1400 01/07/23 1652   BP: 115/68   125/78   BP Location: Left arm   Left arm   Pulse: 94   93   Resp: 18   16   Temp: 99.4  F (37.4  C) 100  F (37.8  C) 98.9  F (37.2  C) (!) 100.5  F (38.1  C)   TempSrc: Oral Oral  Oral   SpO2: 95%   94%   Weight:       Height:        T-max 100, vitally stable, sating 94 on room air, non labor breathing   Denies pain, denies nausea, midline incision staples intact premapore dressing changed   Insulin gtt started, on algorithm 1,audible BS passing gas, voiding adequate, ADDISON # 1 scant out put  ADDISON #2 45 ml, up ambulated, tolerating intake, resting, continue with plan of care.

## 2023-01-08 NOTE — PLAN OF CARE
"/75 (BP Location: Left arm)   Pulse 94   Temp 100.4  F (38  C) (Oral)   Resp 16   Ht 1.803 m (5' 11\")   Wt 92.4 kg (203 lb 11.3 oz)   SpO2 94%   BMI 28.41 kg/m        Time: 1930-0730  Status: POD #3 s/p LAPAROSCOPY, DIAGNOSTIC, BY GENERAL SURGERY, PANCREAS NECROSECTOMY  Neuros: A&Ox4. CMS intact.   Cardiac: intermitten tachyness, denies cardiac chest pain   Respiratory: WNL on RA, denies SOB   Pain: pain controlled with  katty. tylenol  Nausea: Denies nausea.   Diet: Reg  GI/: Voiding spontaneously. Hypo BS, no BM this shift.   Skin/incisions:midline incision covered with primapore dressing, scant drainage is marked-no change, no other skin deficits noted.   Drains: (L) JPx2, bilateral   Lines: L PIV infusing LR @ TKO 10ml/hr   Labs: BG rage 124-230; insulin gtt at Algo 2  Activity: Activity encouraged, up ambulated to BR with SBA.   New Changes this Shift: None   Plan: Appears to rest between cares.Continue POC.           Goal Outcome Evaluation: Ongoing                        "

## 2023-01-09 VITALS
HEIGHT: 71 IN | SYSTOLIC BLOOD PRESSURE: 138 MMHG | BODY MASS INDEX: 28.52 KG/M2 | TEMPERATURE: 96.1 F | RESPIRATION RATE: 16 BRPM | OXYGEN SATURATION: 96 % | WEIGHT: 203.71 LBS | DIASTOLIC BLOOD PRESSURE: 87 MMHG | HEART RATE: 79 BPM

## 2023-01-09 LAB
GLUCOSE BLDC GLUCOMTR-MCNC: 101 MG/DL (ref 70–99)
GLUCOSE BLDC GLUCOMTR-MCNC: 135 MG/DL (ref 70–99)

## 2023-01-09 PROCEDURE — 250N000013 HC RX MED GY IP 250 OP 250 PS 637: Performed by: STUDENT IN AN ORGANIZED HEALTH CARE EDUCATION/TRAINING PROGRAM

## 2023-01-09 PROCEDURE — 250N000011 HC RX IP 250 OP 636

## 2023-01-09 PROCEDURE — 99233 SBSQ HOSP IP/OBS HIGH 50: CPT | Mod: 24 | Performed by: NURSE PRACTITIONER

## 2023-01-09 RX ADMIN — ACETAMINOPHEN 650 MG: 325 TABLET, FILM COATED ORAL at 01:07

## 2023-01-09 RX ADMIN — LEVOTHYROXINE SODIUM 100 MCG: 100 TABLET ORAL at 08:01

## 2023-01-09 RX ADMIN — PANCRELIPASE 4 CAPSULE: 24000; 76000; 120000 CAPSULE, DELAYED RELEASE PELLETS ORAL at 08:07

## 2023-01-09 RX ADMIN — SENNOSIDES AND DOCUSATE SODIUM 1 TABLET: 8.6; 5 TABLET ORAL at 08:06

## 2023-01-09 RX ADMIN — ENOXAPARIN SODIUM 40 MG: 40 INJECTION SUBCUTANEOUS at 08:01

## 2023-01-09 ASSESSMENT — ACTIVITIES OF DAILY LIVING (ADL)
ADLS_ACUITY_SCORE: 20

## 2023-01-09 NOTE — PLAN OF CARE
Goal Outcome Evaluation:  Vitals:    01/08/23 1058 01/08/23 1529 01/08/23 2237 01/09/23 0802   BP:  130/81 136/82 138/87   BP Location:  Left arm Left arm Left arm   Pulse:  81 92 79   Resp:  16 16 16   Temp: 97.7  F (36.5  C) 99.1  F (37.3  C) 99.8  F (37.7  C) (!) 96.1  F (35.6  C)   TempSrc:  Oral Oral Axillary   SpO2:  96% 96% 96%   Weight:       Height:       A/D  Pt denied pain tolerated breakfast well  . Voided spontaneously saved no BM this shift. Midline incisions staples intact ADDISON sites slightly re  with old dry drainage.  JPs patent #2  had thick cloudy drainage 80cc. #1 small amount if srosang.  Pt expressed readiness to go home.   I/P ADDISON drain care education done written information and supplies given.  Blood Glucose monitoring  teaching done by the Endocrine team. PIV removed per policy.   Discharge information reviewed by the bedside RN pt and spouse verbalized understanding et  left for home.

## 2023-01-09 NOTE — PLAN OF CARE
"/82 (BP Location: Left arm)   Pulse 92   Temp 99.8  F (37.7  C) (Oral)   Resp 16   Ht 1.803 m (5' 11\")   Wt 92.4 kg (203 lb 11.3 oz)   SpO2 96%   BMI 28.41 kg/m      Time: 1930-0730  Status: POD #4 s/p LAPAROSCOPY, DIAGNOSTIC, BY GENERAL SURGERY, PANCREAS NECROSECTOMY  Neuros: A&Ox4. CMS intact.   Cardiac: intermitten tachyness, denies cardiac chest pain   Respiratory: WNL on RA, denies SOB   Pain: pain controlled with  katty. tylenol  Nausea: Denies nausea.   Diet: Reg diet, Tolerating it  GI/: Voiding spontaneously. + BS, no BM this shift.   Skin/incisions:midline incision covered with primapore dressing,  no other skin deficits noted.   Drains: (L) JPx2, bilateral   Lines: R PIV  Labs: BG rage 124-140. Carb coverage  Activity: Activity encouraged, up ambulated to BR with SBA.   New Changes this Shift: Tmax 99.8  Plan: Appears to rest between cares.Continue POC.       Goal Outcome Evaluation: ongong                        "

## 2023-01-09 NOTE — DISCHARGE INSTRUCTIONS
IP Diabetes Management Team Discharge Instructions    Glucose Control Regimen:      -Glargine (Lantus, or Basaglar, per insurance coverage) 55 units daily in the 1300 - if you want to change, do it by 1 hour increments   -Aspart (Novolog or Humalog, per insurance coverage): 1:5 grams per carbs with meals and snacks  -Aspart (Novolog or Humalog, per insurance coverage): 2/30 intensity sliding scale (see below)    **As discussed and per your experience last time, if your AM fasting BG is <120 when you wake can start to reduce by 20%   To refresh it is the current dose (so for example 55 unit(s) x .20 = 11, so reduce to 44 unit(s).     Novolog 1 unit(s) per 5 grams of carbohydrate for meals and snacks   If the long-acting changes then to 44 unit(s) the meal insulin would change to 1 unit(s) per 6 or 7 grams of carbohydrates    Novolog custom scale 2/30 TID AC/HS  ISF 15   2 per 30 >/= 140  -169 give 2 units.  -199 give 4 units.  -229 give 6 units.  -259 give 8 units.  -289 give 10 units.  -319 give 12 units.  -349 give 14 units.  BG >/= 350 give 16 units.     -229 give 2 units.  -259 give 4 units.  -289 give 6 units.  -319 give 8 units.  -349 give 10 units.  BG >/= 350 give 12 units.      Blood Glucose Checks:   -blood glucose monitoring three times daily before meals and at bedtime or resume your CGM - Adrien      Endocrinology Outpatient follow up: Per your provider - TADEO Whyte in [place], [place]    If you have urgent questions or concerns regarding your blood sugars or insulin, you may contact [phone] (the main hospital ). Ask to speak with the endocrinologist on call.      Thank you for letting the Diabetes Management Team be involved in your care!  CHRISTIAN Johnson CNP

## 2023-01-09 NOTE — PLAN OF CARE
Vitals:    01/08/23 0700 01/08/23 0739 01/08/23 1058 01/08/23 1529   BP:  126/81  130/81   BP Location:  Left arm  Left arm   Pulse:  74  81   Resp:  16  16   Temp: 98.4  F (36.9  C) 98.7  F (37.1  C) 97.7  F (36.5  C) 99.1  F (37.3  C)   TempSrc: Oral Oral  Oral   SpO2:  96%  96%   Weight:       Height:           Neuro: alert and oriented    VS:T-max 99.1 BP stable, sating 96% on room air, tylenol,     BG: insulin gtt discontinued at 1600. Lantus started, sliding scale and carb coverage     Cardiac: 81, WBC still elevated.    Pain/Nausea: denies nausea, better pain control,     Lines/Drains: left ADDISON x 2, PIV SL,     Incision/Wound:midline incision staple intact,     GI/: voiding adequate not saved, passing gas, audible BS     Diet/Appetite:regular diet, fair appetite     Activity:up independently,ambulated.    Plan:  follow up with plan of care

## 2023-01-10 ENCOUNTER — PATIENT OUTREACH (OUTPATIENT)
Dept: SURGERY | Facility: CLINIC | Age: 63
End: 2023-01-10

## 2023-01-10 NOTE — PROGRESS NOTES
01/10/23    9:02 AM     Vernon Ortiz is a patient of Dr. Paulo Martinez that underwent pancreas necrosectomy on 1/5/23 and discharged yesterday, 1/9/23.  Attempted to contact patient via telephone for a status update and review post-op  teaching.  LM on VM to call office.  Await return call.      Of note:  Wound:  Staples, Steri, Strips, ADDISON drains  Follow-up:  Scheduled 1/23 at 0930 with Dr. Martinez  Restrictions:  - No lifting, pushing, pulling more than 15-20 pounds for 3-4 weeks  New medications:  Zofran, oxycodone, Lantus, Novolog  Equipment/Supplies:  None

## 2023-01-11 ENCOUNTER — PATIENT OUTREACH (OUTPATIENT)
Dept: SURGERY | Facility: CLINIC | Age: 63
End: 2023-01-11

## 2023-01-11 NOTE — PROGRESS NOTES
RN Post-Op/Post-Discharge Care Coordination Note    Spoke with Patient.    Support  Family member assisting with care     Health Status  Nausea/Vomiting: Patient denies nausea/vomiting. Has not needed to take Zofran.  Eating/drinking: Patient is able to eat and drink without any complaints.  Bowel habits: Patient reports having a normal bowel movement.  Drains (ADDISON): Drains patent. #1 15-30 ml/24 hrs and #2 /24 hours  Fevers/chills: Infrequent chill without fever  Incisions: Patient denies any signs and symptoms of infection..  Wound closure:  Steri-strips and  Staples  Pain: Improved. He is taking Tylenol PRN per package instructions  Blood sugars: Fluctuate normal to 190. They are working with a Diabetic Nurse Educator through [employer].    Activity/Restrictions  No lifting in excess of 15-20 pounds for 3-4 weeks    Equipment  None    Pathology reviewed with patient:  Will be discussed with the patient at his PO appt.    Forms/Letters  No    All of his questions were answered.  He will call this office if he has any further questions and/or concerns.      PO appointment with Dr. Martinez 1/23 at 1030.      Whom and When to Call  Patient acknowledges understanding of how to manage any medication changes and   when to seek medical care.     Patient advised that if after hour medical concerns arise to please call [phone] and choose option 4 to speak to the physician on call.

## 2023-01-12 LAB
BACTERIA BLD CULT: NO GROWTH
BACTERIA BLD CULT: NO GROWTH
BACTERIA TISS BX CULT: ABNORMAL

## 2023-01-17 NOTE — PROGRESS NOTES
Discharge Summary Addendum  Coding Clarification    Please see discharge summary dated 1/9/2023 for full details related to hospital course and discharge.     Further specification of admission diagnosis:   - Acute necrotizing pancreatis with infection, resistant to Ciprofloxacin, Erythromycin, Levofloxacin, Oxacillin    Pertinent lab data:  1/5/23 tissue culture   Collected 1/5/2023  2:22 PM      Status: Final result      Visible to patient: Yes (not seen)     Specimen Information: Pancreas; Tissue    0 Result Notes  Culture 2+ Staphylococcus epidermidis Abnormal             Resulting Agency: [facility]     Susceptibility     Staphylococcus epidermidis     LORENZO     Ciprofloxacin >=8 ug/mL Resistant     Clindamycin <=0.25 ug/mL Susceptible 1     Erythromycin >=8 ug/mL Resistant     Gentamicin <=0.5 ug/mL Susceptible     Levofloxacin >=8 ug/mL Resistant     Oxacillin >=4 ug/mL Resistant 2     Tetracycline <=1 ug/mL Susceptible     Vancomycin 2 ug/mL Susceptible                          Collected 1/5/2023  2:22 PM      Status: Final result      Visible to patient: Yes (not seen)     Specimen Information: Pancreas; Tissue    0 Result Notes  Culture 4+ Cutibacterium (Propionibacterium) acnes Abnormal              Tsering Chavarria MD  General Surgery PGY-2

## 2023-01-20 ENCOUNTER — PATIENT OUTREACH (OUTPATIENT)
Dept: SURGERY | Facility: CLINIC | Age: 63
End: 2023-01-20
Payer: COMMERCIAL

## 2023-01-20 NOTE — PROGRESS NOTES
Patient Telephone Reminder Call    Date of call:  01/20/23  Phone numbers:  Home number on file [phone] (home)    Reached patient/confirmed appointment:  Yes  Appointment with:   Dr. Paulo Martinez  Reason for visit:  PO  Remind patient that this visit is a consultation only, NO procedure:  No  Can this visit be changed to a video visit:  No

## 2023-01-23 ENCOUNTER — ANCILLARY PROCEDURE (OUTPATIENT)
Dept: CT IMAGING | Facility: CLINIC | Age: 63
End: 2023-01-23
Attending: SURGERY
Payer: COMMERCIAL

## 2023-01-23 ENCOUNTER — OFFICE VISIT (OUTPATIENT)
Dept: SURGERY | Facility: CLINIC | Age: 63
End: 2023-01-23
Payer: COMMERCIAL

## 2023-01-23 VITALS
SYSTOLIC BLOOD PRESSURE: 99 MMHG | WEIGHT: 198.9 LBS | HEIGHT: 71 IN | DIASTOLIC BLOOD PRESSURE: 66 MMHG | HEART RATE: 80 BPM | BODY MASS INDEX: 27.85 KG/M2 | OXYGEN SATURATION: 98 %

## 2023-01-23 DIAGNOSIS — K85.91 NECROTIZING PANCREATITIS: Primary | ICD-10-CM

## 2023-01-23 DIAGNOSIS — Z98.890 POST-OPERATIVE STATE: ICD-10-CM

## 2023-01-23 DIAGNOSIS — K85.91 NECROTIZING PANCREATITIS: ICD-10-CM

## 2023-01-23 PROCEDURE — 74177 CT ABD & PELVIS W/CONTRAST: CPT | Mod: GC | Performed by: STUDENT IN AN ORGANIZED HEALTH CARE EDUCATION/TRAINING PROGRAM

## 2023-01-23 PROCEDURE — 99024 POSTOP FOLLOW-UP VISIT: CPT | Performed by: SURGERY

## 2023-01-23 RX ORDER — IOPAMIDOL 755 MG/ML
112 INJECTION, SOLUTION INTRAVASCULAR ONCE
Status: COMPLETED | OUTPATIENT
Start: 2023-01-23 | End: 2023-01-23

## 2023-01-23 RX ADMIN — IOPAMIDOL 112 ML: 755 INJECTION, SOLUTION INTRAVASCULAR at 12:15

## 2023-01-23 ASSESSMENT — PAIN SCALES - GENERAL: PAINLEVEL: NO PAIN (0)

## 2023-01-23 NOTE — PROGRESS NOTES
"General Surgery Postop Clinic Note    RE: Vernon Ortiz  MR#: [id number]  : 1960  VISIT DATE: 2023    HISTORY OF PRESENT ILLNESS:  62 M h/o hypothyroidism, necrotizing pancreatitis s/p IR drain placement 10/22, unsucessful endoscopic transgastric drain attempt  then 23 Lap pancreatic necrosectomy with two drains left. \"The lower abdominal drain was fed laterally in what had been the pancreatic tail, and the upper drain was fed medially towards the pancreatic head.\" Cultures from that fluid grew Staph Epidermidis and Propionibacterium acnes.     He has had about 50-80 ml a day from both drains, in the last several days the output has become foul smelling and less clear. He denies any fevers, chills, abdominal pain, issues sleeping, changes in appetite etc    LABS/IMAGING/MEDICAL RECORDS REVIEW:    Cultures from that fluid grew Staph Epidermidis and Propionibacterium acnes.     PHYSICAL EXAMINATION:  BP 99/66 (BP Location: Left arm, Patient Position: Sitting, Cuff Size: Adult Large)   Pulse 80   Ht 1.803 m (5' 11\")   Wt 90.2 kg (198 lb 14.4 oz)   SpO2 98%   BMI 27.74 kg/m     Body mass index is 27.74 kg/m .   NAD, AAOx3  RRR  Non labored breathing  Abd: soft, NT/ND, incisions are healing well without erythema  Both drains have a cloudy place brown output  Ex: wwp    ASSESSMENT AND PLAN:    62 M h/o hypothyroidism, necrotizing pancreatitis s/p IR drain placement 10/22, unsucessful endoscopic drain attempt  then 23 Lap pancreatic necrosectomy with two drains left. The drain output has changed in character to a milky/brown output, differential includes pancreatic leak, abscess, ongoing necrotic pancreas debridement via the drain or potential enterotomy etc    Plan for CT scan with IV and oral contrast    Sincerely,    Juanita Dickinson MD  PGY5 General Surgery Resident    Discussed with staff surgeon Dr. Martinez.       "

## 2023-01-23 NOTE — NURSING NOTE
"Chief Complaint   Patient presents with     Post-Op - General Surgery     Post-op, pancreas necrosectomy       Vitals:    01/23/23 1019   BP: 99/66   BP Location: Left arm   Patient Position: Sitting   Cuff Size: Adult Large   Pulse: 80   SpO2: 98%   Weight: 90.2 kg (198 lb 14.4 oz)   Height: 1.803 m (5' 11\")       Body mass index is 27.74 kg/m .                          Anirudh Georges, EMT    "

## 2023-01-23 NOTE — PATIENT INSTRUCTIONS
You met with Dr. Paulo Martinez.      Today's visit instructions:    Dr. Martinez would like you to undergo a CT scan today. An appointment has been scheduled on the main floor in the Imaging [facility].  Dr. Martinez will call you with results and recommendations.     If you have questions please contact Linn RN or Nuzhat RN during regular clinic hours, Monday through Friday 7:30 AM - 4:00 PM, or you can contact us via [employer] at anytime.       If you have urgent needs after-hours, weekends, or holidays please call the hospital at [phone] and ask to speak with our on-call General Surgery Team.    Appointment [phone]  Nurse Advice (Linn or Nuzhat): [phone]   Surgery Scheduler (Eun): [phone]  Fax: [phone]

## 2023-01-25 ENCOUNTER — PATIENT OUTREACH (OUTPATIENT)
Dept: SURGERY | Facility: CLINIC | Age: 63
End: 2023-01-25
Payer: COMMERCIAL

## 2023-01-25 NOTE — PROGRESS NOTES
Dr. Martinez called patient to review CT scan results. Dr. Martinez would like to follow-up with patient in 2 weeks via telephone. Telephone appointment scheduled for 2/8/23 at 9am. [employer] message sent to patient to confirm appointment date/time works for him.

## 2023-02-02 LAB — BACTERIA TISS BX CULT: NO GROWTH

## 2023-02-08 ENCOUNTER — VIRTUAL VISIT (OUTPATIENT)
Dept: SURGERY | Facility: CLINIC | Age: 63
End: 2023-02-08
Payer: COMMERCIAL

## 2023-02-08 VITALS — WEIGHT: 197 LBS | HEIGHT: 71 IN | BODY MASS INDEX: 27.58 KG/M2

## 2023-02-08 DIAGNOSIS — K85.91 NECROTIZING PANCREATITIS: Primary | ICD-10-CM

## 2023-02-08 PROCEDURE — 99024 POSTOP FOLLOW-UP VISIT: CPT | Mod: 95 | Performed by: SURGERY

## 2023-02-08 ASSESSMENT — PAIN SCALES - GENERAL: PAINLEVEL: NO PAIN (0)

## 2023-02-08 NOTE — PATIENT INSTRUCTIONS
You met with Dr. Paulo Martinez.      Today's visit instructions:    Please return to see Dr. Martinez as scheduled.        If you have questions please contact Linn RN or Nuzhat RN during regular clinic hours, Monday through Friday 7:30 AM - 4:00 PM, or you can contact us via [employer] at anytime.       If you have urgent needs after-hours, weekends, or holidays please call the hospital at [phone] and ask to speak with our on-call General Surgery Team.    Appointment [phone]  Nurse Advice (Linn or Nuzhat): [phone]   Surgery Scheduler (Eun): [phone]  Fax: [phone]

## 2023-02-08 NOTE — PROGRESS NOTES
Vernon is a 62 year old who is being evaluated via a billable telephone visit.      What phone number would you like to be contacted at? [phone]  How would you like to obtain your AVS? James    Distant Location (provider location):  On-site  Phone call duration: 10 minutes    During this telephone visit the patient is located in [place], patient verifies this as the location during the entirety of this visit.       I spoke with Vernon Ortiz and his wife about his recovery after lap-hand assist pancreatic necrosectomy.  He is about 4 weeks out from surgery.      He says he is doing ok. Still with low energy, but otherwise fine. No infectious symptoms, tolerating a diet. Insulin regimen stable, no nausea/vomiting.     Having less output than the last time we spoke (had been a few hundred mls then)- now it is about 50-75ml a day of tan turbid fluid with a foul odor.    PE:  Fluent speech  No resp distress  Alert and conversant    A/P:  Doing as well as to be expected.  Drain output reduced.    I will see him in clinic in 1-2 weeks and plan to remove one of the drains at that time (if things remain stable)- will send fluid for drain amylase at that time as well.

## 2023-02-08 NOTE — NURSING NOTE
"Chief Complaint   Patient presents with     RECHECK     2 week follow up       Vitals:    02/08/23 0723   Weight: 89.4 kg (197 lb)   Height: 1.803 m (5' 11\")       Body mass index is 27.48 kg/m .                          Anirudh Georges, EMT    "

## 2023-02-08 NOTE — LETTER
2/8/2023       RE: Vernon Ortiz  [address] [facility] [address]     Dear Colleague,    Thank you for referring your patient, Vernon Ortiz, to the [facility] [place] at [facility]. Please see a copy of my visit note below.    I spoke with Vernon Ortiz and his wife about his recovery after lap-hand assist pancreatic necrosectomy.  He is about 4 weeks out from surgery.      He says he is doing ok. Still with low energy, but otherwise fine. No infectious symptoms, tolerating a diet. Insulin regimen stable, no nausea/vomiting.     Having less output than the last time we spoke (had been a few hundred mls then)- now it is about 50-75ml a day of tan turbid fluid with a foul odor.    PE:  Fluent speech  No resp distress  Alert and conversant    A/P:  Doing as well as to be expected.  Drain output reduced.    I will see him in clinic in 1-2 weeks and plan to remove one of the drains at that time (if things remain stable)- will send fluid for drain amylase at that time as well.        Sincerely,    Paulo Maritnez MD

## 2023-02-21 ENCOUNTER — OFFICE VISIT (OUTPATIENT)
Dept: SURGERY | Facility: CLINIC | Age: 63
End: 2023-02-21
Payer: COMMERCIAL

## 2023-02-21 VITALS
HEIGHT: 71 IN | WEIGHT: 202.6 LBS | SYSTOLIC BLOOD PRESSURE: 117 MMHG | DIASTOLIC BLOOD PRESSURE: 72 MMHG | BODY MASS INDEX: 28.36 KG/M2 | HEART RATE: 77 BPM | OXYGEN SATURATION: 98 %

## 2023-02-21 DIAGNOSIS — Z98.890 POST-OPERATIVE STATE: ICD-10-CM

## 2023-02-21 DIAGNOSIS — Z48.03 CHANGE OR REMOVAL OF DRAINS: ICD-10-CM

## 2023-02-21 DIAGNOSIS — K85.91 NECROTIZING PANCREATITIS: Primary | ICD-10-CM

## 2023-02-21 PROCEDURE — 99024 POSTOP FOLLOW-UP VISIT: CPT | Performed by: SURGERY

## 2023-02-21 ASSESSMENT — PAIN SCALES - GENERAL: PAINLEVEL: NO PAIN (0)

## 2023-02-21 NOTE — PATIENT INSTRUCTIONS
You met with Dr. Paulo Martinez.      Today's visit instructions:    Return to the Surgery Clinic on an as needed basis.        If you have questions please contact Linn RN or Nuzhat RN during regular clinic hours, Monday through Friday 7:30 AM - 4:00 PM, or you can contact us via [employer] at anytime.       If you have urgent needs after-hours, weekends, or holidays please call the hospital at [phone] and ask to speak with our on-call General Surgery Team.    Appointment [phone]  Nurse Advice (Linn or Nuzhat): [phone]   Surgery Scheduler (Eun): [phone]  Fax: [phone]

## 2023-02-21 NOTE — PROGRESS NOTES
"I saw Vernon Ortiz today in clinic with his wife for drain removal.  He is about 6 weeks out from lap/hand assist necrosectomy for necrotizing pancreatitis.  Two drains were placed.  He has been at home and recovering well.    He thinks he is doing well. Energy levels improved, walking more. No infectious signs, tolerating a diet. Insulin regimen stable.    His drain output is stable at about 60-100ml between the two.  Somedays one drain has more than the other, then it will switch.  Foul odor less noticeable.    I reviewed his drain outputs- each drain putting out about 40 a day, though will vary.    PE:  /72 (BP Location: Left arm, Patient Position: Sitting, Cuff Size: Adult Regular)   Pulse 77   Ht 1.803 m (5' 11\")   Wt 91.9 kg (202 lb 9.6 oz)   SpO2 98%   BMI 28.26 kg/m    A+Ox3, NAD  RRR  No resp distress or wheezing  His abdomen is soft, nondistended. His incisions are healed. There is tan fluid in both drains.      I removed the drain on his lower abdomen and covered the site with a dry dressing.    I sent the fluid from the drain for amylase.      A/P:  Continues to recover.  I will follow-up on the drain amylase. If this is elevated, will discuss with GI team about ERCP/sphincterotomy.    I will plan to call him in two weeks for drain follow-up     Return precautions given if he runs into trouble after drain removal.  "

## 2023-02-21 NOTE — NURSING NOTE
"Chief Complaint   Patient presents with     Post-Op - General Surgery     2 week follow up       Vitals:    02/21/23 1137   BP: 117/72   BP Location: Left arm   Patient Position: Sitting   Cuff Size: Adult Regular   Pulse: 77   SpO2: 98%   Weight: 91.9 kg (202 lb 9.6 oz)   Height: 1.803 m (5' 11\")       Body mass index is 28.26 kg/m .                          Anirudh Georges, EMT    "

## 2023-02-21 NOTE — LETTER
"2/21/2023       RE: Vernon Ortiz  [address] [facility] [address]     Dear Colleague,    Thank you for referring your patient, Vernon Ortiz, to the [facility] GENERAL SURGERY CLINIC [place] at [facility]. Please see a copy of my visit note below.    I saw Vernon Ortiz today in clinic with his wife for drain removal.  He is about 6 weeks out from lap/hand assist necrosectomy for necrotizing pancreatitis.  Two drains were placed.  He has been at home and recovering well.    He thinks he is doing well. Energy levels improved, walking more. No infectious signs, tolerating a diet. Insulin regimen stable.    His drain output is stable at about 60-100ml between the two.  Somedays one drain has more than the other, then it will switch.  Foul odor less noticeable.    I reviewed his drain outputs- each drain putting out about 40 a day, though will vary.    PE:  /72 (BP Location: Left arm, Patient Position: Sitting, Cuff Size: Adult Regular)   Pulse 77   Ht 1.803 m (5' 11\")   Wt 91.9 kg (202 lb 9.6 oz)   SpO2 98%   BMI 28.26 kg/m    A+Ox3, NAD  RRR  No resp distress or wheezing  His abdomen is soft, nondistended. His incisions are healed. There is tan fluid in both drains.      I removed the drain on his lower abdomen and covered the site with a dry dressing.    I sent the fluid from the drain for amylase.      A/P:  Continues to recover.  I will follow-up on the drain amylase. If this is elevated, will discuss with GI team about ERCP/sphincterotomy.    I will plan to call him in two weeks for drain follow-up     Return precautions given if he runs into trouble after drain removal.        Sincerely,    Paulo Martinez MD  "

## 2023-03-08 ENCOUNTER — VIRTUAL VISIT (OUTPATIENT)
Dept: SURGERY | Facility: CLINIC | Age: 63
End: 2023-03-08
Payer: COMMERCIAL

## 2023-03-08 VITALS — HEIGHT: 71 IN | WEIGHT: 191 LBS | BODY MASS INDEX: 26.74 KG/M2

## 2023-03-08 DIAGNOSIS — Z98.890 POST-OPERATIVE STATE: Primary | ICD-10-CM

## 2023-03-08 PROCEDURE — 99024 POSTOP FOLLOW-UP VISIT: CPT | Mod: 93 | Performed by: SURGERY

## 2023-03-08 ASSESSMENT — PAIN SCALES - GENERAL: PAINLEVEL: NO PAIN (0)

## 2023-03-08 NOTE — PROGRESS NOTES
Vernon is a 62 year old who is being evaluated via a billable telephone visit.      What phone number would you like to be contacted at? [phone]  How would you like to obtain your AVS? James    Distant Location (provider location):  On-site  Phone call duration: 8 minutes    During this telephone visit the patient is located in [place], patient verifies this as the location during the entirety of this visit.       I spoke with Vernon LOPEZ Ortiz today in follow-up of his surgical drain.    Last we met I removed one of his drains.  The remaining drain is still putting out fluid- about 50-60ml a day (down from prior), remains tan in appearance.    Eating ok, no fevers/chills/nausea/vomiting.  Denies abdominal pain.    Blood sugars remain relatively stable- meeting with endocrinology to help managing    The old drain site has closed up, not draining    PE:  No resp distress  Fluent speech  A+Ox3, No distress, pleasant    A/P:  Continues with moderate drain output, otherwise doing well.    Telephone call in 3 weeks for drain check

## 2023-03-08 NOTE — LETTER
3/8/2023       RE: Vernon Ortiz  [address] [facility] [address]     Dear Colleague,    Thank you for referring your patient, Vernon Ortiz, to the [facility] GENERAL SURGERY CLINIC [place] at [facility]. Please see a copy of my visit note below.    I spoke with Vernon Ortiz today in follow-up of his surgical drain.    Last we met I removed one of his drains.  The remaining drain is still putting out fluid- about 50-60ml a day (down from prior), remains tan in appearance.    Eating ok, no fevers/chills/nausea/vomiting.  Denies abdominal pain.    Blood sugars remain relatively stable- meeting with endocrinology to help managing    The old drain site has closed up, not draining    PE:  No resp distress  Fluent speech  A+Ox3, No distress, pleasant    A/P:  Continues with moderate drain output, otherwise doing well.    Telephone call in 3 weeks for drain check      Sincerely,    Paulo Martinez MD

## 2023-03-08 NOTE — NURSING NOTE
"Chief Complaint   Patient presents with     RECHECK     2 week follow up       Vitals:    03/08/23 0750   Weight: 86.6 kg (191 lb)   Height: 1.803 m (5' 11\")       Body mass index is 26.64 kg/m .                          Anirudh Georges, EMT    "

## 2023-03-08 NOTE — PATIENT INSTRUCTIONS
You met with Dr. Paulo Martinez.      Today's visit instructions:    Dr. Martinez would like to follow-up with you via telephone in 3 weeks. We will reach out to you to schedule this appointment.    If you have questions please contact Linn RN or Nuzhat RN during regular clinic hours, Monday through Friday 7:30 AM - 4:00 PM, or you can contact us via [employer] at anytime.       If you have urgent needs after-hours, weekends, or holidays please call the hospital at [phone] and ask to speak with our on-call General Surgery Team.    Appointment [phone]  Nurse Advice (Linn or Nuzhat): [phone]   Surgery Scheduler (Eun): [phone]  Fax: [phone]

## 2023-03-29 ENCOUNTER — VIRTUAL VISIT (OUTPATIENT)
Dept: SURGERY | Facility: CLINIC | Age: 63
End: 2023-03-29
Payer: COMMERCIAL

## 2023-03-29 DIAGNOSIS — K85.91 NECROTIZING PANCREATITIS: Primary | ICD-10-CM

## 2023-03-29 PROCEDURE — 99024 POSTOP FOLLOW-UP VISIT: CPT | Mod: 93 | Performed by: SURGERY

## 2023-03-29 ASSESSMENT — PAIN SCALES - GENERAL: PAINLEVEL: NO PAIN (0)

## 2023-03-29 NOTE — PROGRESS NOTES
Vernon Ortiz is a 62 year old year old who is being evaluated via a billable telephone visit.      Patient confirmed that they are in [place] for today's visit Yes    What phone number would you like to be contacted at? [phone]  How would you like to obtain your AVS? James    Phone call duration: 12 minutes    Drain output increased over the past few days.  Emptying the bulb multiple times a day.  Is tannish in color, sometimes brownish.  No odor.  Intermittent thicker particulate matter in the fluid.  Overall he feels ok. No fever, no nausea, no chills, eating without issue.  Sugars have been ok.  Walking more and more.    PE:  Alert, pleasant  No resp distress   Fluent speech    A/P:  Persistently high drain output.  Question pancreatic fistula vs continued breakdown of the peripancreatic capsule vs infection.    Will arrange for fluid study (amylase and cultures).  I do not feel imaging would be helpful at this time (though should he become ill, would pursue CT scan).    Will follow-up drain studies

## 2023-03-29 NOTE — NURSING NOTE
Chief Complaint   Patient presents with     RECHECK     3 week follow up       There were no vitals filed for this visit.    There is no height or weight on file to calculate BMI.          CLARE CUEVAS EMT

## 2023-03-29 NOTE — LETTER
3/29/2023       RE: Vernon Ortiz  [address] [facility] [address]     Dear Colleague,    Thank you for referring your patient, Vernon Ortiz, to the [facility] GENERAL SURGERY CLINIC [place] at [facility]. Please see a copy of my visit note below.    Drain output increased over the past few days.  Emptying the bulb multiple times a day.  Is tannish in color, sometimes brownish.  No odor.  Intermittent thicker particulate matter in the fluid.  Overall he feels ok. No fever, no nausea, no chills, eating without issue.  Sugars have been ok.  Walking more and more.    PE:  Alert, pleasant  No resp distress   Fluent speech    A/P:  Persistently high drain output.  Question pancreatic fistula vs continued breakdown of the peripancreatic capsule vs infection.    Will arrange for fluid study (amylase and cultures).  I do not feel imaging would be helpful at this time (though should he become ill, would pursue CT scan).    Will follow-up drain studies          Sincerely,    Paulo Martinez MD

## 2023-03-29 NOTE — PATIENT INSTRUCTIONS
You met with Dr. Paulo Martinez.      Today's visit instructions:    Lab orders have been faxed to [facility]. Please call them to schedule a doctor or nurse visit to help you collect the fluid needed for testing.        If you have questions please contact Linn RN or Nuzhat RN during regular clinic hours, Monday through Friday 7:30 AM - 4:00 PM, or you can contact us via [employer] at anytime.       If you have urgent needs after-hours, weekends, or holidays please call the hospital at [phone] and ask to speak with our on-call General Surgery Team.    Appointment [phone]  Nurse Advice (Linn or Nuzhat): [phone]   Surgery Scheduler (Eun): [phone]  Fax: [phone]

## 2023-04-03 ENCOUNTER — PATIENT OUTREACH (OUTPATIENT)
Dept: SURGERY | Facility: CLINIC | Age: 63
End: 2023-04-03
Payer: COMMERCIAL

## 2023-04-03 DIAGNOSIS — K85.91 NECROTIZING PANCREATITIS: Primary | ICD-10-CM

## 2023-04-03 NOTE — PROGRESS NOTES
Amylase and cultures from drain available for review and discussed with Dr. Martinez. He plans to discuss with the GI Team and then the patient will be contacted.    QA on Request message sent to the patient.

## 2023-04-05 ENCOUNTER — TELEPHONE (OUTPATIENT)
Dept: SURGERY | Facility: CLINIC | Age: 63
End: 2023-04-05
Payer: COMMERCIAL

## 2023-04-05 NOTE — TELEPHONE ENCOUNTER
No return phone number was left. This writer re-faxed MRCP order to [facility]. Patient was told that this clinic does not have Secretin needed for the imaging so he scheduled his MRCP with [facility] for 5/2/23. Dr. Martinez is aware of this and is ok with this date. Patient was also placed on the Waiting List for MRCP.

## 2023-04-05 NOTE — TELEPHONE ENCOUNTER
[facility] Call Center    Phone Message    May a detailed message be left on voicemail: yes     Reason for Call: Order(s): Other:   Reason for requested: Needing Signed MRI Orders  Date needed: ASAP  Provider name: Dr. Martinez   Please fax to [phone]. Thank you!      Action Taken: Message routed to:  [facility] ([facility]): General Surgery    Travel Screening: Not Applicable

## 2023-04-11 ENCOUNTER — PATIENT OUTREACH (OUTPATIENT)
Dept: SURGERY | Facility: CLINIC | Age: 63
End: 2023-04-11
Payer: COMMERCIAL

## 2023-04-11 NOTE — PROGRESS NOTES
Received results from [facility] for fungal culture of drain fluid and MRCP. Also had images pushed to PACS for MRCP. Dr. Martinez states that once images are in PACS a message should be sent to Dr. Santos to review to see if stenting is a good option. Will send message to Dr. Santos once images are available in PACS.    ADDENDUM  04/11/23  10:04 AM  MRCP images in PACS. Message sent to Dr. Santos and his RNCC.

## 2023-04-14 ENCOUNTER — PATIENT OUTREACH (OUTPATIENT)
Dept: GASTROENTEROLOGY | Facility: CLINIC | Age: 63
End: 2023-04-14
Payer: COMMERCIAL

## 2023-04-14 ENCOUNTER — PREP FOR PROCEDURE (OUTPATIENT)
Dept: GASTROENTEROLOGY | Facility: CLINIC | Age: 63
End: 2023-04-14
Payer: COMMERCIAL

## 2023-04-14 DIAGNOSIS — K86.89 PANCREATIC DUCT DISRUPTION: Primary | ICD-10-CM

## 2023-04-14 NOTE — PROGRESS NOTES
Called Vernon to discuss time of procedure and NPO requirements. Patient articulates and understanding of the plan.     Claudette Hunt RN Care Coordinator

## 2023-04-14 NOTE — PROGRESS NOTES
Procedure/Imaging/Clinic: ERCP with possible EUS  Physician: Jaimie  Timing: Next available  Procedure length: Provider average  Anesthesia:General  Dx: Disconnected pancreatic duct  Tier: 2  Location: [facility]  Referred by: Dr. Martinez     Comments:

## 2023-04-14 NOTE — PROGRESS NOTES
Procedure/Imaging/Clinic: ERCP with possible EUS  Physician: Jaimie  Timing: Next available  Procedure length: Provider average  Anesthesia:General  Dx: Disconnected pancreatic duct  Tier: 2  Location: [facility]  Referred by: Dr. Martinez    Patient is agreeable to schedule 4/17.     Called to discuss with patient. Explained they will need a , someone to stay with them for 24 hours and should stay in town for 24 hours (within 45 min of Hospital) post procedure.    Patient will need a pre-op physical within 30 days of procedure. If outside [facility] system, will need physical faxed to number [phone]   If you do not get a preop physical, your procedure could be cancelled, patient voiced understanding*    Preop Plan: PCP office 4/14/23 @ 3:50PM    Med Review    Blood thinner -  None  ASA - None  Diabetic - Lantus/novolog    COVID monitoring discussed: James    Patient Education r/t procedure: Discussion / MyChart    Does patient have any history of gastric bypass/gastric surgery/altered panc/bili anatomy? No    A pre-op nurse will call 1-2 days prior to the procedure.    NPO/Prep: No solid food 8 hours prior to arrival at the hospital. Clear liquids okay until one hour prior to arrival.     Other specific details/comments: Patient reports continued large output to drain. Changes colors and sediment at times.     Verbalized understanding of all instructions. All questions answered. Clinic contact and scheduling numbers verified for future questions/concerns.    Claudette Hunt RN, BSN  Care Coordinator  Advanced Endoscopy

## 2023-04-16 ENCOUNTER — ANESTHESIA EVENT (OUTPATIENT)
Dept: SURGERY | Facility: CLINIC | Age: 63
End: 2023-04-16
Payer: COMMERCIAL

## 2023-04-16 DIAGNOSIS — T88.4XXA HARD TO INTUBATE, INITIAL ENCOUNTER: Primary | ICD-10-CM

## 2023-04-17 ENCOUNTER — APPOINTMENT (OUTPATIENT)
Dept: GENERAL RADIOLOGY | Facility: CLINIC | Age: 63
End: 2023-04-17
Attending: INTERNAL MEDICINE
Payer: COMMERCIAL

## 2023-04-17 ENCOUNTER — HOSPITAL ENCOUNTER (OUTPATIENT)
Facility: CLINIC | Age: 63
Discharge: HOME | End: 2023-04-17
Attending: INTERNAL MEDICINE | Admitting: INTERNAL MEDICINE
Payer: COMMERCIAL

## 2023-04-17 ENCOUNTER — ANESTHESIA (OUTPATIENT)
Dept: SURGERY | Facility: CLINIC | Age: 63
End: 2023-04-17
Payer: COMMERCIAL

## 2023-04-17 VITALS
SYSTOLIC BLOOD PRESSURE: 103 MMHG | DIASTOLIC BLOOD PRESSURE: 70 MMHG | TEMPERATURE: 97.2 F | HEIGHT: 71 IN | WEIGHT: 203.26 LBS | HEART RATE: 43 BPM | BODY MASS INDEX: 28.46 KG/M2 | OXYGEN SATURATION: 99 % | RESPIRATION RATE: 16 BRPM

## 2023-04-17 PROBLEM — T88.4XXA HARD TO INTUBATE: Status: ACTIVE | Noted: 2023-04-17

## 2023-04-17 LAB
ALBUMIN SERPL BCG-MCNC: 3.9 G/DL (ref 3.5–5.2)
ALP SERPL-CCNC: 56 U/L (ref 40–129)
ALT SERPL W P-5'-P-CCNC: 15 U/L (ref 10–50)
AMYLASE SERPL-CCNC: 43 U/L (ref 28–100)
ANION GAP SERPL CALCULATED.3IONS-SCNC: 11 MMOL/L (ref 7–15)
AST SERPL W P-5'-P-CCNC: 17 U/L (ref 10–50)
BILIRUB SERPL-MCNC: 0.4 MG/DL
BUN SERPL-MCNC: 21.9 MG/DL (ref 8–23)
CALCIUM SERPL-MCNC: 8.6 MG/DL (ref 8.8–10.2)
CHLORIDE SERPL-SCNC: 103 MMOL/L (ref 98–107)
CREAT SERPL-MCNC: 1.01 MG/DL (ref 0.67–1.17)
DEPRECATED HCO3 PLAS-SCNC: 23 MMOL/L (ref 22–29)
ERCP: NORMAL
ERYTHROCYTE [DISTWIDTH] IN BLOOD BY AUTOMATED COUNT: 15.4 % (ref 10–15)
GFR SERPL CREATININE-BSD FRML MDRD: 83 ML/MIN/1.73M2
GLUCOSE BLDC GLUCOMTR-MCNC: 176 MG/DL (ref 70–99)
GLUCOSE BLDC GLUCOMTR-MCNC: 90 MG/DL (ref 70–99)
GLUCOSE SERPL-MCNC: 222 MG/DL (ref 70–99)
HCT VFR BLD AUTO: 41.8 % (ref 40–53)
HGB BLD-MCNC: 13.3 G/DL (ref 13.3–17.7)
INR PPP: 1.07 (ref 0.85–1.15)
LIPASE SERPL-CCNC: 21 U/L (ref 13–60)
MCH RBC QN AUTO: 27.4 PG (ref 26.5–33)
MCHC RBC AUTO-ENTMCNC: 31.8 G/DL (ref 31.5–36.5)
MCV RBC AUTO: 86 FL (ref 78–100)
PLATELET # BLD AUTO: 133 10E3/UL (ref 150–450)
POTASSIUM SERPL-SCNC: 4.3 MMOL/L (ref 3.4–5.3)
PROT SERPL-MCNC: 6.8 G/DL (ref 6.4–8.3)
RBC # BLD AUTO: 4.85 10E6/UL (ref 4.4–5.9)
SODIUM SERPL-SCNC: 137 MMOL/L (ref 136–145)
WBC # BLD AUTO: 4.4 10E3/UL (ref 4–11)

## 2023-04-17 PROCEDURE — 250N000011 HC RX IP 250 OP 636: Performed by: NURSE ANESTHETIST, CERTIFIED REGISTERED

## 2023-04-17 PROCEDURE — 255N000002 HC RX 255 OP 636: Performed by: INTERNAL MEDICINE

## 2023-04-17 PROCEDURE — 370N000017 HC ANESTHESIA TECHNICAL FEE, PER MIN: Performed by: INTERNAL MEDICINE

## 2023-04-17 PROCEDURE — 999N000141 HC STATISTIC PRE-PROCEDURE NURSING ASSESSMENT: Performed by: INTERNAL MEDICINE

## 2023-04-17 PROCEDURE — 83690 ASSAY OF LIPASE: CPT | Performed by: INTERNAL MEDICINE

## 2023-04-17 PROCEDURE — 82150 ASSAY OF AMYLASE: CPT | Performed by: INTERNAL MEDICINE

## 2023-04-17 PROCEDURE — 710N000009 HC RECOVERY PHASE 1, LEVEL 1, PER MIN: Performed by: INTERNAL MEDICINE

## 2023-04-17 PROCEDURE — 258N000003 HC RX IP 258 OP 636: Performed by: ANESTHESIOLOGY

## 2023-04-17 PROCEDURE — 250N000009 HC RX 250: Performed by: INTERNAL MEDICINE

## 2023-04-17 PROCEDURE — 85610 PROTHROMBIN TIME: CPT | Performed by: INTERNAL MEDICINE

## 2023-04-17 PROCEDURE — C1726 CATH, BAL DIL, NON-VASCULAR: HCPCS | Performed by: INTERNAL MEDICINE

## 2023-04-17 PROCEDURE — 272N000001 HC OR GENERAL SUPPLY STERILE: Performed by: INTERNAL MEDICINE

## 2023-04-17 PROCEDURE — 999N000179 XR SURGERY CARM FLUORO LESS THAN 5 MIN W STILLS: Mod: TC

## 2023-04-17 PROCEDURE — 85014 HEMATOCRIT: CPT | Performed by: INTERNAL MEDICINE

## 2023-04-17 PROCEDURE — 82962 GLUCOSE BLOOD TEST: CPT

## 2023-04-17 PROCEDURE — 80053 COMPREHEN METABOLIC PANEL: CPT | Performed by: INTERNAL MEDICINE

## 2023-04-17 PROCEDURE — 710N000012 HC RECOVERY PHASE 2, PER MINUTE: Performed by: INTERNAL MEDICINE

## 2023-04-17 PROCEDURE — 250N000009 HC RX 250: Performed by: NURSE ANESTHETIST, CERTIFIED REGISTERED

## 2023-04-17 PROCEDURE — 36415 COLL VENOUS BLD VENIPUNCTURE: CPT | Performed by: INTERNAL MEDICINE

## 2023-04-17 PROCEDURE — C1769 GUIDE WIRE: HCPCS | Performed by: INTERNAL MEDICINE

## 2023-04-17 PROCEDURE — 360N000083 HC SURGERY LEVEL 3 W/ FLUORO, PER MIN: Performed by: INTERNAL MEDICINE

## 2023-04-17 PROCEDURE — 250N000025 HC SEVOFLURANE, PER MIN: Performed by: INTERNAL MEDICINE

## 2023-04-17 PROCEDURE — C2617 STENT, NON-COR, TEM W/O DEL: HCPCS | Performed by: INTERNAL MEDICINE

## 2023-04-17 PROCEDURE — 258N000003 HC RX IP 258 OP 636: Performed by: NURSE ANESTHETIST, CERTIFIED REGISTERED

## 2023-04-17 DEVICE — STENT ZIMMON BILIARY 07FRX05CM DBL PIGTAIL ZSO-7-5: Type: IMPLANTABLE DEVICE | Site: PANCREATIC DUCT | Status: FUNCTIONAL

## 2023-04-17 DEVICE — STENT GEENEN PANCREA 5FRX3CM G22107 GPSO-5-3: Type: IMPLANTABLE DEVICE | Status: FUNCTIONAL

## 2023-04-17 RX ORDER — LIDOCAINE 40 MG/G
CREAM TOPICAL
Status: DISCONTINUED | OUTPATIENT
Start: 2023-04-17 | End: 2023-04-17 | Stop reason: HOSPADM

## 2023-04-17 RX ORDER — FENTANYL CITRATE 50 UG/ML
INJECTION, SOLUTION INTRAMUSCULAR; INTRAVENOUS PRN
Status: DISCONTINUED | OUTPATIENT
Start: 2023-04-17 | End: 2023-04-17

## 2023-04-17 RX ORDER — ONDANSETRON 4 MG/1
4 TABLET, ORALLY DISINTEGRATING ORAL EVERY 30 MIN PRN
Status: DISCONTINUED | OUTPATIENT
Start: 2023-04-17 | End: 2023-04-17 | Stop reason: HOSPADM

## 2023-04-17 RX ORDER — LEVOFLOXACIN 5 MG/ML
INJECTION, SOLUTION INTRAVENOUS PRN
Status: DISCONTINUED | OUTPATIENT
Start: 2023-04-17 | End: 2023-04-17

## 2023-04-17 RX ORDER — DEXAMETHASONE SODIUM PHOSPHATE 4 MG/ML
INJECTION, SOLUTION INTRA-ARTICULAR; INTRALESIONAL; INTRAMUSCULAR; INTRAVENOUS; SOFT TISSUE PRN
Status: DISCONTINUED | OUTPATIENT
Start: 2023-04-17 | End: 2023-04-17

## 2023-04-17 RX ORDER — KETAMINE HYDROCHLORIDE 10 MG/ML
INJECTION INTRAMUSCULAR; INTRAVENOUS PRN
Status: DISCONTINUED | OUTPATIENT
Start: 2023-04-17 | End: 2023-04-17

## 2023-04-17 RX ORDER — NALOXONE HYDROCHLORIDE 0.4 MG/ML
0.2 INJECTION, SOLUTION INTRAMUSCULAR; INTRAVENOUS; SUBCUTANEOUS
Status: DISCONTINUED | OUTPATIENT
Start: 2023-04-17 | End: 2023-04-17 | Stop reason: HOSPADM

## 2023-04-17 RX ORDER — FENTANYL CITRATE 50 UG/ML
25 INJECTION, SOLUTION INTRAMUSCULAR; INTRAVENOUS EVERY 5 MIN PRN
Status: DISCONTINUED | OUTPATIENT
Start: 2023-04-17 | End: 2023-04-17 | Stop reason: HOSPADM

## 2023-04-17 RX ORDER — FLUMAZENIL 0.1 MG/ML
0.2 INJECTION, SOLUTION INTRAVENOUS
Status: DISCONTINUED | OUTPATIENT
Start: 2023-04-17 | End: 2023-04-17 | Stop reason: HOSPADM

## 2023-04-17 RX ORDER — ONDANSETRON 2 MG/ML
4 INJECTION INTRAMUSCULAR; INTRAVENOUS EVERY 6 HOURS PRN
Status: DISCONTINUED | OUTPATIENT
Start: 2023-04-17 | End: 2023-04-17 | Stop reason: HOSPADM

## 2023-04-17 RX ORDER — ONDANSETRON 4 MG/1
4 TABLET, ORALLY DISINTEGRATING ORAL EVERY 6 HOURS PRN
Status: DISCONTINUED | OUTPATIENT
Start: 2023-04-17 | End: 2023-04-17 | Stop reason: HOSPADM

## 2023-04-17 RX ORDER — LIDOCAINE HYDROCHLORIDE 20 MG/ML
INJECTION, SOLUTION INFILTRATION; PERINEURAL PRN
Status: DISCONTINUED | OUTPATIENT
Start: 2023-04-17 | End: 2023-04-17

## 2023-04-17 RX ORDER — PROPOFOL 10 MG/ML
INJECTION, EMULSION INTRAVENOUS PRN
Status: DISCONTINUED | OUTPATIENT
Start: 2023-04-17 | End: 2023-04-17

## 2023-04-17 RX ORDER — SODIUM CHLORIDE, SODIUM LACTATE, POTASSIUM CHLORIDE, CALCIUM CHLORIDE 600; 310; 30; 20 MG/100ML; MG/100ML; MG/100ML; MG/100ML
INJECTION, SOLUTION INTRAVENOUS CONTINUOUS PRN
Status: DISCONTINUED | OUTPATIENT
Start: 2023-04-17 | End: 2023-04-17

## 2023-04-17 RX ORDER — NALOXONE HYDROCHLORIDE 0.4 MG/ML
0.4 INJECTION, SOLUTION INTRAMUSCULAR; INTRAVENOUS; SUBCUTANEOUS
Status: DISCONTINUED | OUTPATIENT
Start: 2023-04-17 | End: 2023-04-17 | Stop reason: HOSPADM

## 2023-04-17 RX ORDER — ONDANSETRON 2 MG/ML
4 INJECTION INTRAMUSCULAR; INTRAVENOUS EVERY 30 MIN PRN
Status: DISCONTINUED | OUTPATIENT
Start: 2023-04-17 | End: 2023-04-17 | Stop reason: HOSPADM

## 2023-04-17 RX ORDER — GLYCOPYRROLATE 0.2 MG/ML
INJECTION, SOLUTION INTRAMUSCULAR; INTRAVENOUS PRN
Status: DISCONTINUED | OUTPATIENT
Start: 2023-04-17 | End: 2023-04-17

## 2023-04-17 RX ORDER — SODIUM CHLORIDE, SODIUM LACTATE, POTASSIUM CHLORIDE, CALCIUM CHLORIDE 600; 310; 30; 20 MG/100ML; MG/100ML; MG/100ML; MG/100ML
INJECTION, SOLUTION INTRAVENOUS CONTINUOUS
Status: DISCONTINUED | OUTPATIENT
Start: 2023-04-17 | End: 2023-04-17 | Stop reason: HOSPADM

## 2023-04-17 RX ORDER — ONDANSETRON 2 MG/ML
INJECTION INTRAMUSCULAR; INTRAVENOUS PRN
Status: DISCONTINUED | OUTPATIENT
Start: 2023-04-17 | End: 2023-04-17

## 2023-04-17 RX ORDER — INDOMETHACIN 50 MG/1
SUPPOSITORY RECTAL PRN
Status: DISCONTINUED | OUTPATIENT
Start: 2023-04-17 | End: 2023-04-17 | Stop reason: HOSPADM

## 2023-04-17 RX ORDER — IOPAMIDOL 510 MG/ML
INJECTION, SOLUTION INTRAVASCULAR PRN
Status: DISCONTINUED | OUTPATIENT
Start: 2023-04-17 | End: 2023-04-17 | Stop reason: HOSPADM

## 2023-04-17 RX ORDER — HYDROMORPHONE HYDROCHLORIDE 1 MG/ML
0.2 INJECTION, SOLUTION INTRAMUSCULAR; INTRAVENOUS; SUBCUTANEOUS EVERY 5 MIN PRN
Status: DISCONTINUED | OUTPATIENT
Start: 2023-04-17 | End: 2023-04-17 | Stop reason: HOSPADM

## 2023-04-17 RX ADMIN — SUGAMMADEX 200 MG: 100 INJECTION, SOLUTION INTRAVENOUS at 10:07

## 2023-04-17 RX ADMIN — PHENYLEPHRINE HYDROCHLORIDE 100 MCG: 10 INJECTION INTRAVENOUS at 09:38

## 2023-04-17 RX ADMIN — GLYCOPYRROLATE 0.1 MG: 0.2 INJECTION, SOLUTION INTRAMUSCULAR; INTRAVENOUS at 08:06

## 2023-04-17 RX ADMIN — Medication 10 MG: at 08:23

## 2023-04-17 RX ADMIN — Medication 30 MG: at 07:42

## 2023-04-17 RX ADMIN — LIDOCAINE HYDROCHLORIDE 100 MG: 20 INJECTION, SOLUTION INFILTRATION; PERINEURAL at 07:42

## 2023-04-17 RX ADMIN — Medication 10 MG: at 08:44

## 2023-04-17 RX ADMIN — LEVOFLOXACIN 500 MG: 5 INJECTION, SOLUTION INTRAVENOUS at 07:55

## 2023-04-17 RX ADMIN — Medication 10 MG: at 09:48

## 2023-04-17 RX ADMIN — Medication 60 MG: at 07:42

## 2023-04-17 RX ADMIN — SODIUM CHLORIDE, POTASSIUM CHLORIDE, SODIUM LACTATE AND CALCIUM CHLORIDE: 600; 310; 30; 20 INJECTION, SOLUTION INTRAVENOUS at 11:03

## 2023-04-17 RX ADMIN — GLUCAGON 0.4 MG: KIT at 09:23

## 2023-04-17 RX ADMIN — FENTANYL CITRATE 50 MCG: 50 INJECTION, SOLUTION INTRAMUSCULAR; INTRAVENOUS at 07:38

## 2023-04-17 RX ADMIN — SODIUM CHLORIDE, POTASSIUM CHLORIDE, SODIUM LACTATE AND CALCIUM CHLORIDE: 600; 310; 30; 20 INJECTION, SOLUTION INTRAVENOUS at 07:38

## 2023-04-17 RX ADMIN — FENTANYL CITRATE 50 MCG: 50 INJECTION, SOLUTION INTRAMUSCULAR; INTRAVENOUS at 08:22

## 2023-04-17 RX ADMIN — PROPOFOL 150 MG: 10 INJECTION, EMULSION INTRAVENOUS at 07:42

## 2023-04-17 RX ADMIN — DEXAMETHASONE SODIUM PHOSPHATE 10 MG: 4 INJECTION, SOLUTION INTRA-ARTICULAR; INTRALESIONAL; INTRAMUSCULAR; INTRAVENOUS; SOFT TISSUE at 07:59

## 2023-04-17 RX ADMIN — ONDANSETRON 4 MG: 2 INJECTION INTRAMUSCULAR; INTRAVENOUS at 09:18

## 2023-04-17 ASSESSMENT — LIFESTYLE VARIABLES: TOBACCO_USE: 1

## 2023-04-17 ASSESSMENT — ACTIVITIES OF DAILY LIVING (ADL)
ADLS_ACUITY_SCORE: 35

## 2023-04-17 NOTE — ANESTHESIA POSTPROCEDURE EVALUATION
Patient: Vernon Ortiz    Procedure: Procedure(s):  ENDOSCOPIC RETROGRADE CHOLANGIOPANCREATOGRAPHY with stents placed into pancreatic fistula via gastro ,gastrectomy x3, resuturing of drain on left lower abdomen       Anesthesia Type:  General    Note:  Disposition: Outpatient   Postop Pain Control: Uneventful            Sign Out: Well controlled pain   PONV: No   Neuro/Psych:    Airway/Respiratory: Uneventful            Sign Out: Acceptable/Baseline resp. status   CV/Hemodynamics: Uneventful            Sign Out: Acceptable CV status; No obvious hypovolemia; No obvious fluid overload   Other NRE: NONE   DID A NON-ROUTINE EVENT OCCUR? No           Last vitals:  Vitals Value Taken Time   /92 04/17/23 1145   Temp 36.5  C (97.7  F) 04/17/23 1045   Pulse 50 04/17/23 1149   Resp 14 04/17/23 1149   SpO2 96 % 04/17/23 1154   Vitals shown include unvalidated device data.    Electronically Signed By: West Joseph MD  April 17, 2023  1:34 PM

## 2023-04-17 NOTE — PROGRESS NOTES
Dr. Montes was notified because no discharge instructions wrote for discharge. Dr. Montes told writer and pt no changes in home medications and to keep ADDISON clamped until next visit. Dr. Montes told pt that the clinic will call him to set up an appointment. Tsering Hutton RN on 4/17/2023 at 12:51 PM

## 2023-04-17 NOTE — ANESTHESIA PROCEDURE NOTES
Airway         Procedure Start/Stop Times: 4/17/2023 7:45 AM  Staff -        CRNA: Kashif Guzmán APRN CRNA       Performed By: CRNAIndications and Patient Condition         Mask difficulty assessment: 1 - vent by mask    Final Airway Details       Final airway type: endotracheal airway       Successful airway: ETT - single  Endotracheal Airway Details        ETT size (mm): 8.0       Cuffed: yes       Successful intubation technique: video laryngoscopy       VL Blade Size: Glidescope 4       Grade View of Cords: 1 (w/ Glide, 3+ w/ direct)       Adjucts: stylet       Position: Right       Measured from: lips       Secured at (cm): 23    Post intubation assessment        Placement verified by: capnometry and equal breath sounds        Number of attempts at approach: 3       Number of other approaches attempted: 1       Ease of procedure: difficult (very anterior even w/ glide.)    Medication(s) Administered   Medication Administration Time: 4/17/2023 7:45 AM    Additional Comments       Grade 3+ view w/ DL. Glidescope brought in, grade 1 view but very anterior.

## 2023-04-17 NOTE — ANESTHESIA CARE TRANSFER NOTE
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    Anesthesia Type:   General     Note:    Oropharynx: oropharynx clear of all foreign objects  Level of Consciousness: awake  Oxygen Supplementation: face mask    Independent Airway: airway patency satisfactory and stable  Dentition: dentition unchanged  Vital Signs Stable: post-procedure vital signs reviewed and stable              Vitals:  Vitals Value Taken Time   /79 04/17/23 1045   Temp     Pulse 48 04/17/23 1053   Resp 12 04/17/23 1053   SpO2 100 % 04/17/23 1053   Vitals shown include unvalidated device data.    Electronically Signed By: CHRISTIAN Gunn CRNA  April 17, 2023  10:54 AM

## 2023-04-17 NOTE — BRIEF OP NOTE
[facility]    Brief Operative Note  Vernon Ortiz is a 62 year old male with a PMHx of necrotizing pancreatitis that had been managed by IR drain placement in October 2022 and an attempted transgastric drainage of walled-off necrosis and an endoscopic debridement via EUS however was unsuccessful. EUS showed walled of central pancreatic necrosis. Not amenable to EUS-guided transmural drainage due to limited contact with the gastric wall and lack of fluid component. He subsequently underwent laparoscopic necrosectomy on 01/05/2023 with removal of 14 cm solid piece of necrotic pancreas- this appeared to be the entire portion of necrotic pancreas seen on prior imaging. CT AP on 01/23/2023 showed pancreatic tail fluid collection measuring 3.0 x 2.7 cm and pancreatic body collection measuring 3.1 cm and 1.2 cm.Recent MRCP  On 4/7/2023 showed residual pancreatic tissue in tail that measures 3 cm in diameter with a PD measuring 2.5 mm and a separate residual pancreatic head and uncinate tissue area measures approximately 3.3 x 5.0 x 2.5 cm with normal caliber pancreatic duct consistent with disrupted pancreatic duct. However, no evidence of mass, pseudocyst, or walled-off necrosis on imaging. He still has one percutaneous drainage with high output. He presents for an ERCP +/- EUS for further evaluation.    Pre-operative diagnosis: Pancreatic duct disruption [K86.89]  Post-operative diagnosis Same as pre-operative diagnosis    Procedure: Procedure(s):  ENDOSCOPIC RETROGRADE CHOLANGIOPANCREATOGRAPHY with stents placed into pancreatic fistula via gastro gastrectomy x3  Surgeon: Surgeon(s) and Role:     * Guru Monika Etienne MD - Primary     * Krish Ellison MD     * Serafin Montes MD     * Warner Almanza MD  Anesthesia: General   Estimated Blood Loss: Minimal    Drains: None  Specimens: * No specimens in log *  Findings:     The  film showed  percutaneaus drainage originating from the left side of the abdominal wall and terminating in the second portion of the duodenum.The esophagus was successfully intubated under direct vision. 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 The drain traverses the duodenal bulb into the descending portion of the duodenum. The scope was advanced to a normal major papilla in the descending duodenum without detailed examination of the pharynx, larynx and associated structures, and upper GI tract.  The upper GI tract was grossly normal.  The ventral pancreatic duct was deeply cannulated with the short-nosed traction sphincterotome.  Contrast was injected.  I personally interpreted the pancreatic duct images.  Ductal flow of contrast was adequate.  Image quality was excellent. Opacification of the ventral pancreatic duct in the head of the pancreas was successful.  Disruption of the pancreatic duct was found in the ventral pancreatic duct in the head of the pancreas.  One protective 5 Fr by 3 cm Geenen stent with two external flaps and a single internal flap was placed 2.5 cm into the ventral pancreatic duct.  Clear fluid flowed through the stent.  The stent was in good position. Surgery team (Dr. Almanza) was consulted intraoperatively and discussed with the idea of internalizing the percutaneous drainage by pulling the surgical drain distally into the cystoduodenal fistula tract and placing multiple stents via the cystoduodenal fistula track. Dr. Almanza was agreeable to the plan.The fistula track was accessed with a Visiglide wire and surgical drain was then pulled distally into the prior walled off necrosis cavity. Two 7 Fr x 5 cm DPT Zimmon pancreatic stents and one 7 Fr x 7 cm DPT Zimmon pancreatic stent was placed into the cavity of prior walled off pancreatic necrosis over a wire.    Complications: None.  Implants:   Implant Name Type Inv.  Item Serial No.  Lot No. LRB No. Used Action   STENT GEENEN PANCREA [id number] GPSO-[id number] - [id number] Stent STENT GEENEN PANCREA [id number] GPSO-5-3  [facility] [id number] N/A 1 Implanted     Recommendations  - Observe patient in same day observation unit for ongoing care with plans for discharge to home later today   - Recommend to the patient and family to clump the percutaneous drainage to assess if the drainage stents are sufficiently draining internally. If stents successfully internalizes the drainage, will plan on removing the percutaneous drainage in two weeks in clinic     - Will plan for cross sectional imaging in two weeks or sooner if patient is symptomatic   - Follow up in clinic with Dr. GURU ARABELLA VIGIL   - The findings and recommendations were discussed with the patient and their family

## 2023-04-17 NOTE — DISCHARGE INSTRUCTIONS
[facility]  Same-Day Surgery   Adult Discharge Orders & Instructions     For 24 hours after surgery    Get plenty of rest.  A responsible adult must stay with you for at least 24 hours after you leave the hospital.   Do not drive or use heavy equipment.  If you have weakness or tingling, don't drive or use heavy equipment until this feeling goes away.  Do not drink alcohol.  Avoid strenuous or risky activities.  Ask for help when climbing stairs.   You may feel lightheaded.  IF so, sit for a few minutes before standing.  Have someone help you get up.   If you have nausea (feel sick to your stomach): Drink only clear liquids such as apple juice, ginger ale, broth or 7-Up.  Rest may also help.  Be sure to drink enough fluids.  Move to a regular diet as you feel able.  You may have a slight fever. Call the doctor if your fever is over 100 F (37.7 C) (taken under the tongue) or lasts longer than 24 hours.  You may have a dry mouth, a sore throat, muscle aches or trouble sleeping.  These should go away after 24 hours.  Do not make important or legal decisions.   Call your doctor for any of the followin.  Signs of infection (fever, growing tenderness at the surgery site, a large amount of drainage or bleeding, severe pain, foul-smelling drainage, redness, swelling).    2. It has been over 8 to 10 hours since surgery and you are still not able to urinate (pass water).    3.  Headache for over 24 hours.    To contact a doctor, call Dr. Fritz' office at [phone]   or:    '   [phone] and ask for the resident on call for   Gastroenterology (answered 24 hours a day)  '   Emergency Department:    [facility]: [phone]       (TTY for hearing impaired: [phone])    [facility]: [phone]       (TTY for hearing impaired: [phone])

## 2023-04-17 NOTE — ANESTHESIA PREPROCEDURE EVALUATION
Anesthesia Pre-Procedure Evaluation    Patient: Vernon Ortiz   MRN: [id number] : 1960        Procedure : Procedure(s):  ENDOSCOPIC RETROGRADE CHOLANGIOPANCREATOGRAPHY  possible ENDOSCOPIC ULTRASOUND, ESOPHAGOSCOPY / UPPER GASTROINTESTINAL TRACT (GI)          Past Medical History:   Diagnosis Date     Diabetes mellitus (H)      HLD (hyperlipidemia)      Hypothyroidism      Necrotizing pancreatitis       Past Surgical History:   Procedure Laterality Date     APPENDECTOMY       ENDOSCOPIC ULTRASOUND UPPER GASTROINTESTINAL TRACT (GI) N/A 2022    Procedure: ESOPHAGOGASTRODUODENOSCOPY, WITH ENDOSCOPIC ULTRASOUND,;  Surgeon: Krish Ellison MD;  [facility]     IR ABSCESS TUBE CHANGE  2022     IR PERITONEAL ABSCESS DRAINAGE  10/26/2022     IR SINOGRAM INJECTION DIAGNOSTIC  11/15/2022     LAPAROSCOPY DIAGNOSTIC (GENERAL) N/A 2023    Procedure: LAPAROSCOPY, DIAGNOSTIC, BY GENERAL SURGERY, PANCREAS NECROSECTOMY;  Surgeon: Paulo Martinez MD;  Location: [facility]      No Known Allergies   Social History     Tobacco Use     Smoking status: Former     Years: 9.00     Types: Cigarettes     Smokeless tobacco: Never   Vaping Use     Vaping status: Not on file   Substance Use Topics     Alcohol use: Never      Wt Readings from Last 1 Encounters:   23 92.2 kg (203 lb 4.2 oz)        Anesthesia Evaluation   Pt has had prior anesthetic. Type: General and MAC.    History of anesthetic complications       ROS/MED HX  ENT/Pulmonary:     (+) tobacco use, Current use,     Neurologic:       Cardiovascular:     (+) hypertension-----    METS/Exercise Tolerance:     Hematologic:       Musculoskeletal:       GI/Hepatic:       Renal/Genitourinary:       Endo:     (+) type II DM,  (-) Type I DM   Psychiatric/Substance Use:       Infectious Disease:       Malignancy:       Other:            Physical Exam    Airway        Mallampati: II   TM distance: > 3 FB   Neck ROM: full   Mouth opening: < 3  cm    Respiratory Devices and Support         Dental           Cardiovascular   cardiovascular exam normal       Rhythm and rate: regular and normal     Pulmonary   pulmonary exam normal        breath sounds clear to auscultation           OUTSIDE LABS:  CBC:   Lab Results   Component Value Date    WBC 4.4 04/17/2023    WBC 16.3 (H) 01/08/2023    HGB 13.3 04/17/2023    HGB 13.5 01/08/2023    HCT 41.8 04/17/2023    HCT 42.6 01/08/2023     (L) 04/17/2023     01/08/2023     BMP:   Lab Results   Component Value Date     (L) 01/06/2023     12/08/2022    POTASSIUM 4.2 01/06/2023    POTASSIUM 4.4 12/08/2022    CHLORIDE 102 01/06/2023    CHLORIDE 103 12/08/2022    CO2 21 (L) 01/06/2023    CO2 23 12/08/2022    BUN 11.8 01/06/2023    BUN 16.3 12/08/2022    CR 0.84 01/06/2023    CR 0.90 01/05/2023    GLC 90 04/17/2023     (H) 01/09/2023     COAGS:   Lab Results   Component Value Date    INR 1.02 12/08/2022     POC: No results found for: BGM, HCG, HCGS  HEPATIC:   Lab Results   Component Value Date    ALBUMIN 2.8 (L) 10/29/2022    PROTTOTAL 5.7 (L) 10/29/2022    ALT 22 10/29/2022    AST 25 10/29/2022    ALKPHOS 64 10/29/2022    BILITOTAL 1.0 10/29/2022     OTHER:   Lab Results   Component Value Date    LACT 0.9 01/06/2023    A1C 5.6 10/27/2022    DOLORES 8.8 01/06/2023    PHOS 3.1 11/07/2022    MAG 1.9 10/30/2022       Anesthesia Plan    ASA Status:  3   NPO Status:  NPO Appropriate    Anesthesia Type: General.     - Airway: ETT   Induction: Intravenous, Propofol.   Maintenance: Balanced.   Techniques and Equipment:     - Airway: Video-Laryngoscope         Consents    Anesthesia Plan(s) and associated risks, benefits, and realistic alternatives discussed. Questions answered and patient/representative(s) expressed understanding.     - Discussed: Risks, Benefits and Alternatives for the PROCEDURE were discussed     - Discussed with:  Patient      - Extended Intubation/Ventilatory Support Discussed:  No.      - Patient is DNR/DNI Status: No    Use of blood products discussed: Yes.     - Discussed with: Patient.     - Consented: consented to blood products            Reason for refusal: other.     Postoperative Care    Pain management: IV analgesics, Oral pain medications.   PONV prophylaxis: Ondansetron (or other 5HT-3), Dexamethasone or Solumedrol     Comments:                West Joseph MD

## 2023-04-18 ENCOUNTER — PATIENT OUTREACH (OUTPATIENT)
Dept: GASTROENTEROLOGY | Facility: CLINIC | Age: 63
End: 2023-04-18
Payer: COMMERCIAL

## 2023-04-18 NOTE — TELEPHONE ENCOUNTER
"Per Dr. Etienne called to check on patient post procedure    Per patient, \"he feels pretty good, some sore throat\" Drain is closed, no pain/fever/chills. Provided my direct phone # for any questions/concerns/symptoms.  Update sent to Dr. Etienne    ML  "

## 2023-04-24 ENCOUNTER — PATIENT OUTREACH (OUTPATIENT)
Dept: GASTROENTEROLOGY | Facility: CLINIC | Age: 63
End: 2023-04-24
Payer: COMMERCIAL

## 2023-04-24 ENCOUNTER — PREP FOR PROCEDURE (OUTPATIENT)
Dept: GASTROENTEROLOGY | Facility: CLINIC | Age: 63
End: 2023-04-24
Payer: COMMERCIAL

## 2023-04-24 DIAGNOSIS — K86.89 PANCREATIC DUCT DISRUPTION: Primary | ICD-10-CM

## 2023-04-24 NOTE — PROGRESS NOTES
Follow up: Post ERCP on 23 with Dr. Etienne.      Post procedure recommendations:   - Discharge patient to home (ambulatory).    - Observe patient in same day observation unit for ongoing care with plans for discharge to home later today   - Recommend to the patient and family to clump the percutaneous drainage to assess if the drainage stents are sufficiently draining internally. If stents successfully internalizes the drainage, will plan on removing the percutaneous drainage in two weeks in the OR    - Will plan for CT scan abdomen with IV contrast in two weeks or sooner if patient is symptomatic (worsening abdominal pain in the setting of clamped internal drain)    - Will recommend repeat ERCP to remove the PD stent and also removal of the surgical drain at the same time   - The findings and recommendations were discussed with  the patient and their family     Patient states:   Drain is clamped. Patient denies increase in pain. Denies fevers. Stooling normally. Eating and drinking well.     Orders placed:   CT abdomen with IV contrast (due 23)    Procedure/Imaging/Clinic: ERCP with stent removal  Physician: Jaimie  Timin weeks  Procedure length: Provider average  Anesthesia:General  Dx: Disconnected pancreatic duct  Tier: 2  [facility]: [facility]  Referred by: Dr. Martinez     Comments: Offer 5/3/23; patient agreeable to schedule this date.   CT scan scheduled for 5/3/23 @ 0800 at [facility] (check in at 0730)    Pre-op: PCP 23  Anticogulation: None  Diabetes: lantus/novolog    Reviewed post procedure recommendations and discussed symptoms to report to our clinic. Patient articulated understanding.     Clinic contact and scheduling numbers verified for future questions/concerns.     Claudette Hunt RN Care Coordinator

## 2023-04-24 NOTE — PROGRESS NOTES
Procedure/Imaging/Clinic: ERCP with stent removal  Physician: Jaimie  Timin weeks  Procedure length: Provider average  Anesthesia:General  Dx: Disconnected pancreatic duct  Tier: 2  [facility]: [facility]  Referred by: Dr. Martinez

## 2023-04-27 NOTE — PROGRESS NOTES
Following discussion with Dr. Martinez, per Dr. Etienne: Please reach out to Dr Martinez's team. He will pull out his tube in clinic     Then CT and then EGD for PD stent removal in [facility] under MAC. No need for ERCP.    Message routed to Dr. Martinez's team. Called patient to discuss change in plan. Will continue to monitor for drain removal and schedule imaging and stent removal to follow.     Claudette Hunt RN Care Coordinator

## 2023-04-28 NOTE — PROGRESS NOTES
Surgical drain removal scheduled for 5/3/23.     Will arrange for CT scan and EGD to follow per Dr. Etienne's orders.     Claudette Hunt RN Care Coordinator

## 2023-05-02 ENCOUNTER — TELEPHONE (OUTPATIENT)
Dept: GASTROENTEROLOGY | Facility: CLINIC | Age: 63
End: 2023-05-02
Payer: COMMERCIAL

## 2023-05-02 NOTE — TELEPHONE ENCOUNTER
Screening Questions  BLUE  KIND OF PREP RED  LOCATION [review exclusion criteria] GREEN  SEDATION TYPE        Y Are you active on mychart?       Guru Monika Etienne MD   Ordering/Referring Provider?        BLUE PLUS What type of coverage do you have?      N Have you had a positive covid test in the last 14 days?     27.7 1. BMI  [BMI 40+ - review exclusion criteria& smart-phrase document]    Y  2. Are you able to give consent for your medical care? [IF NO,RN REVIEW]          N  3. Are you taking any prescription pain medications on a routine schedule   (ex narcotics: oxycodone, roxicodone, oxycontin,  and percocet)? [RN Review]          3a. EXTENDED PREP What kind of prescription?     N 4. Do you have any chemical dependencies such as alcohol, street drugs, or methadone?        **If yes 3- 5 , please schedule with MAC sedation.**          IF YES TO ANY 6 - 10 - HOSPITAL SETTING ONLY.     N 6.   Do you need assistance transferring?     N 7.   Have you had a heart or lung transplant?    N 8.   Are you currently on dialysis?   N 9.   Do you use daily home oxygen?   N 10. Do you take nitroglycerin?   10a.  If yes, how often?     11. [FEMALES]  N/A Are you currently pregnant?    11a.  If yes, how many weeks? [ Greater than 12 weeks, OR NEEDED]    N 12. Do you have Pulmonary Hypertension? *NEED PAC APPT AT [facility] w/ MAC*     N 13. [review exclusion criteria]  Do you have any implantable devices in your body (pacemaker, defib, LVAD)?    N 14. In the past 6 months, have you had any heart related issues including cardiomyopathy or heart attack?     14a.  If yes, did it require cardiac stenting if so when?     N 15. Have you had a stroke or Transient ischemic attack (TIA - aka  mini stroke ) within 6 months?      N 16. Do you have mod to severe Obstructive Sleep Apnea?  [Hospital only]    N 17. Do you have SEVERE AND UNCONTROLLED asthma? *NEED PAC APPT AT [facility] w/MAC*     18. Are you currently taking any  "blood thinners?     18a. No. Continue to 19.   18b. Yes/no Blood Thinner: No [CONTINUE TO #19]    N 19. Do you take the medication Phentermine?    19a. If yes, \"Hold for 7 days before procedure.  Please consult your prescribing provider if you have questions about holding this medication.\"     N  20. Do you have chronic kidney disease?      Y  21. Do you have a diagnosis of diabetes?     N  22. On a regular basis do you go 3-5 days between bowel movements?      23. Preferred LOCAL Pharmacy for Pre Prescription    [ LIST ONLY ONE PHARMACY]       [facility] [address], [address] [place] [address]      - CLOSING REMINDERS -    Informed patient they will need an adult    Cannot take any type of public or medical transportation alone    Conscious Sedation- Needs  for 6 hours after the procedure       MAC/General-Needs  for 24 hours after procedure    Pre-Procedure Covid test to be completed [[facility] PCR Testing Required]    Confirmed Nurse will call to complete assessment       - SCHEDULING DETAILS -  YES Hospital Setting Required? If yes, what is the exclusion?:  IS ONLY AT [facility]  Surgeon    6/6  Date of Procedure  Upper Endoscopy [EGD]  Type of Procedure Scheduled  [facility] Location   Which Colonoscopy Prep was Sent?     MAC, PER ORDER Sedation Type     N PAC / Pre-op Required                 "

## 2023-05-03 ENCOUNTER — OFFICE VISIT (OUTPATIENT)
Dept: SURGERY | Facility: CLINIC | Age: 63
End: 2023-05-03
Payer: COMMERCIAL

## 2023-05-03 ENCOUNTER — ANCILLARY PROCEDURE (OUTPATIENT)
Dept: CT IMAGING | Facility: CLINIC | Age: 63
End: 2023-05-03
Payer: COMMERCIAL

## 2023-05-03 VITALS
HEART RATE: 48 BPM | SYSTOLIC BLOOD PRESSURE: 105 MMHG | OXYGEN SATURATION: 99 % | WEIGHT: 201.9 LBS | BODY MASS INDEX: 28.27 KG/M2 | HEIGHT: 71 IN | DIASTOLIC BLOOD PRESSURE: 66 MMHG

## 2023-05-03 DIAGNOSIS — K86.89 PANCREATIC DUCT DISRUPTION: ICD-10-CM

## 2023-05-03 DIAGNOSIS — Z48.03 CHANGE OR REMOVAL OF DRAINS: Primary | ICD-10-CM

## 2023-05-03 PROCEDURE — 99212 OFFICE O/P EST SF 10 MIN: CPT | Performed by: SURGERY

## 2023-05-03 PROCEDURE — 74160 CT ABDOMEN W/CONTRAST: CPT | Mod: GC | Performed by: RADIOLOGY

## 2023-05-03 RX ORDER — IOPAMIDOL 755 MG/ML
111 INJECTION, SOLUTION INTRAVASCULAR ONCE
Status: COMPLETED | OUTPATIENT
Start: 2023-05-03 | End: 2023-05-03

## 2023-05-03 RX ADMIN — IOPAMIDOL 111 ML: 755 INJECTION, SOLUTION INTRAVASCULAR at 14:10

## 2023-05-03 ASSESSMENT — PAIN SCALES - GENERAL: PAINLEVEL: NO PAIN (0)

## 2023-05-03 NOTE — NURSING NOTE
"Chief Complaint   Patient presents with     RECHECK     Drain removal       Vitals:    05/03/23 0938   BP: 105/66   BP Location: Left arm   Patient Position: Sitting   Cuff Size: Adult Large   Pulse: (!) 48   SpO2: 99%   Weight: 91.6 kg (201 lb 14.4 oz)   Height: 1.803 m (5' 11\")       Body mass index is 28.16 kg/m .                          Anirudh Georges, EMT    "

## 2023-05-03 NOTE — LETTER
"5/3/2023       RE: Vernon Ortiz  [address] [facility] [address]       Dear Colleague,    Thank you for referring your patient, Vernon Ortiz, to the [facility] [place] at [facility]. Please see a copy of my visit note below.    Follow up visit     62 M h/o hypothyroidism, necrotizing pancreatitis s/p IR drain placement 10/22, unsucessful endoscopic transgastric drain attempt 12/8 then 1/5/23 Lap pancreatic necrosectomy with two drains left. \"The lower abdominal drain was fed laterally in what had been the pancreatic tail, and the upper drain was fed medially towards the pancreatic head.\" Cultures from that fluid grew Staph Epidermidis and Propionibacterium acnes.      He has had the drain clamped since his last endoscopic procedure on 4/17 during which the necrotic cavity was accessed with multiple stents. He is not on antibiotics any longer. He denies any fevers, chills, abdominal pain, issues sleeping, changes in appetite.    Feels very well. Sugars appropriately controlled per his report.    PE:  /66 (BP Location: Left arm, Patient Position: Sitting, Cuff Size: Adult Large)   Pulse (!) 48   Ht 1.803 m (5' 11\")   Wt 91.6 kg (201 lb 14.4 oz)   SpO2 99%   BMI 28.16 kg/m    A+O, pleasant  RRR  No resp distress or wheezing  Abd soft and nondistended  Drain clamped.    I removed the drain and placed a dry dressing over the site    Labs/Imaging  CT abdomen ordered for today, will review its results and plan to discuss with GI    Impression  Doing well post-drainage of pancreatic necrosis  Drain removed today  Follow up prn with me (patient knows how to reach out with questions)      Total time spent- 10 minutes (imaging review, counseling, exam)          Again, thank you for allowing me to participate in the care of your patient.      Sincerely,    Paulo Martinez MD      "

## 2023-05-03 NOTE — PATIENT INSTRUCTIONS
You met with Dr. Paulo Martinez.      Today's visit instructions:    Return to the Surgery Clinic on an as needed basis.        If you have questions please contact Linn RN or Nuzhat RN during regular clinic hours, Monday through Friday 7:30 AM - 4:00 PM, or you can contact us via [email or web address] at anytime.       If you have urgent needs after-hours, weekends, or holidays please call the hospital at [phone] and ask to speak with our on-call General Surgery Team.    Appointment [phone]  Nurse Advice (Linn or Nuzhat): [phone]   Surgery Scheduler (Eun): [phone]  Fax: [phone]

## 2023-05-03 NOTE — PROGRESS NOTES
"Follow up visit     62 M h/o hypothyroidism, necrotizing pancreatitis s/p IR drain placement 10/22, unsucessful endoscopic transgastric drain attempt 12/8 then 1/5/23 Lap pancreatic necrosectomy with two drains left. \"The lower abdominal drain was fed laterally in what had been the pancreatic tail, and the upper drain was fed medially towards the pancreatic head.\" Cultures from that fluid grew Staph Epidermidis and Propionibacterium acnes.      He has had the drain clamped since his last endoscopic procedure on 4/17 during which the necrotic cavity was accessed with multiple stents. He is not on antibiotics any longer. He denies any fevers, chills, abdominal pain, issues sleeping, changes in appetite.    Feels very well. Sugars appropriately controlled per his report.    PE:  /66 (BP Location: Left arm, Patient Position: Sitting, Cuff Size: Adult Large)   Pulse (!) 48   Ht 1.803 m (5' 11\")   Wt 91.6 kg (201 lb 14.4 oz)   SpO2 99%   BMI 28.16 kg/m    A+O, pleasant  RRR  No resp distress or wheezing  Abd soft and nondistended  Drain clamped.    I removed the drain and placed a dry dressing over the site    Labs/Imaging  CT abdomen ordered for today, will review its results and plan to discuss with GI    Impression  Doing well post-drainage of pancreatic necrosis  Drain removed today  Follow up prn with me (patient knows how to reach out with questions)      Total time spent- 10 minutes (imaging review, counseling, exam)      "

## 2023-05-19 ENCOUNTER — TELEPHONE (OUTPATIENT)
Dept: GASTROENTEROLOGY | Facility: CLINIC | Age: 63
End: 2023-05-19

## 2023-05-19 NOTE — TELEPHONE ENCOUNTER
Attempted to contact patient regarding upcoming Upper endoscopy (EGD) procedure on 6/6/23 for pre assessment questions. No answer.     Left message to return call to [phone] #4    Discuss Covid policy and designated  policy.    Pre op exam? N/A    Arrival time: 0930. Procedure time: 1100    Facility location: [facility]; [address], [address], [place] [address]    Sedation type: MAC    NSAIDs? No NSAID medications per patient's medication list.  RN will verify with pre-assessment call.    Anticoagulants: No    Electronic implanted devices? No    Diabetic? Yes. Insulin. Consult with managing provider    Indication for procedure: Pancreatic duct stent removal    Prep instructions sent via [email or web address], sent by scheduling staff.     Tiffanie Seo RN  Endoscopy Procedure Pre Assessment RN

## 2023-05-21 ENCOUNTER — HEALTH MAINTENANCE LETTER (OUTPATIENT)
Age: 63
End: 2023-05-21

## 2023-05-22 NOTE — TELEPHONE ENCOUNTER
Patient returned call    Pre assessment questions completed for upcoming Upper endoscopy (EGD) procedure scheduled on 6/6/23    COVID policy reviewed.     Pre-op exam? N/A    Reviewed procedural arrival time 0930, procedure time 1100 and facility location [facility]; [address], [place] [address]    Designated  policy reviewed. Instructed to have someone stay 24 hours post procedure.     NSAIDs? No    Anticoagulation/blood thinners? No    Electronic implanted devices? No    Diabetic? Yes. Insulin. Consult with managing provider    Procedure indication: PD stent removal    Reviewed procedure prep instructions.     Prep instructions sent via [employer].      Patient verbalized understanding and had no questions or concerns at this time.    Tiffanie Seo RN  Endoscopy Procedure Pre Assessment RN

## 2023-06-06 ENCOUNTER — HOSPITAL ENCOUNTER (OUTPATIENT)
Facility: CLINIC | Age: 63
Discharge: HOME | End: 2023-06-06
Attending: INTERNAL MEDICINE | Admitting: INTERNAL MEDICINE
Payer: COMMERCIAL

## 2023-06-06 ENCOUNTER — ANESTHESIA EVENT (OUTPATIENT)
Dept: GASTROENTEROLOGY | Facility: CLINIC | Age: 63
End: 2023-06-06
Payer: COMMERCIAL

## 2023-06-06 ENCOUNTER — ANESTHESIA (OUTPATIENT)
Dept: GASTROENTEROLOGY | Facility: CLINIC | Age: 63
End: 2023-06-06
Payer: COMMERCIAL

## 2023-06-06 VITALS
SYSTOLIC BLOOD PRESSURE: 112 MMHG | DIASTOLIC BLOOD PRESSURE: 68 MMHG | RESPIRATION RATE: 16 BRPM | TEMPERATURE: 97.7 F | HEIGHT: 71 IN | OXYGEN SATURATION: 100 % | HEART RATE: 45 BPM | WEIGHT: 197 LBS | BODY MASS INDEX: 27.58 KG/M2

## 2023-06-06 LAB
GLUCOSE BLDC GLUCOMTR-MCNC: 111 MG/DL (ref 70–99)
UPPER GI ENDOSCOPY: NORMAL

## 2023-06-06 PROCEDURE — 370N000017 HC ANESTHESIA TECHNICAL FEE, PER MIN: Performed by: INTERNAL MEDICINE

## 2023-06-06 PROCEDURE — 258N000003 HC RX IP 258 OP 636: Performed by: NURSE ANESTHETIST, CERTIFIED REGISTERED

## 2023-06-06 PROCEDURE — 99207 PR NO BILLABLE SERVICE THIS VISIT: CPT | Mod: GC | Performed by: INTERNAL MEDICINE

## 2023-06-06 PROCEDURE — 82962 GLUCOSE BLOOD TEST: CPT

## 2023-06-06 PROCEDURE — 250N000009 HC RX 250: Performed by: NURSE ANESTHETIST, CERTIFIED REGISTERED

## 2023-06-06 PROCEDURE — 250N000011 HC RX IP 250 OP 636: Performed by: NURSE ANESTHETIST, CERTIFIED REGISTERED

## 2023-06-06 PROCEDURE — 43247 EGD REMOVE FOREIGN BODY: CPT | Performed by: INTERNAL MEDICINE

## 2023-06-06 PROCEDURE — 43235 EGD DIAGNOSTIC BRUSH WASH: CPT | Performed by: INTERNAL MEDICINE

## 2023-06-06 RX ORDER — ONDANSETRON 4 MG/1
4 TABLET, ORALLY DISINTEGRATING ORAL EVERY 30 MIN PRN
Status: DISCONTINUED | OUTPATIENT
Start: 2023-06-06 | End: 2023-06-06 | Stop reason: HOSPADM

## 2023-06-06 RX ORDER — FENTANYL CITRATE 50 UG/ML
25 INJECTION, SOLUTION INTRAMUSCULAR; INTRAVENOUS EVERY 5 MIN PRN
Status: DISCONTINUED | OUTPATIENT
Start: 2023-06-06 | End: 2023-06-06 | Stop reason: HOSPADM

## 2023-06-06 RX ORDER — ONDANSETRON 4 MG/1
4 TABLET, ORALLY DISINTEGRATING ORAL EVERY 6 HOURS PRN
Status: DISCONTINUED | OUTPATIENT
Start: 2023-06-06 | End: 2023-06-06 | Stop reason: HOSPADM

## 2023-06-06 RX ORDER — PROPOFOL 10 MG/ML
INJECTION, EMULSION INTRAVENOUS PRN
Status: DISCONTINUED | OUTPATIENT
Start: 2023-06-06 | End: 2023-06-06

## 2023-06-06 RX ORDER — LIDOCAINE 40 MG/G
CREAM TOPICAL
Status: DISCONTINUED | OUTPATIENT
Start: 2023-06-06 | End: 2023-06-06 | Stop reason: HOSPADM

## 2023-06-06 RX ORDER — GLYCOPYRROLATE 0.2 MG/ML
INJECTION, SOLUTION INTRAMUSCULAR; INTRAVENOUS PRN
Status: DISCONTINUED | OUTPATIENT
Start: 2023-06-06 | End: 2023-06-06

## 2023-06-06 RX ORDER — NALOXONE HYDROCHLORIDE 0.4 MG/ML
0.4 INJECTION, SOLUTION INTRAMUSCULAR; INTRAVENOUS; SUBCUTANEOUS
Status: DISCONTINUED | OUTPATIENT
Start: 2023-06-06 | End: 2023-06-06 | Stop reason: HOSPADM

## 2023-06-06 RX ORDER — ONDANSETRON 2 MG/ML
INJECTION INTRAMUSCULAR; INTRAVENOUS PRN
Status: DISCONTINUED | OUTPATIENT
Start: 2023-06-06 | End: 2023-06-06

## 2023-06-06 RX ORDER — SODIUM CHLORIDE, SODIUM LACTATE, POTASSIUM CHLORIDE, CALCIUM CHLORIDE 600; 310; 30; 20 MG/100ML; MG/100ML; MG/100ML; MG/100ML
INJECTION, SOLUTION INTRAVENOUS CONTINUOUS PRN
Status: DISCONTINUED | OUTPATIENT
Start: 2023-06-06 | End: 2023-06-06

## 2023-06-06 RX ORDER — FENTANYL CITRATE 50 UG/ML
50 INJECTION, SOLUTION INTRAMUSCULAR; INTRAVENOUS EVERY 5 MIN PRN
Status: DISCONTINUED | OUTPATIENT
Start: 2023-06-06 | End: 2023-06-06 | Stop reason: HOSPADM

## 2023-06-06 RX ORDER — ONDANSETRON 2 MG/ML
4 INJECTION INTRAMUSCULAR; INTRAVENOUS EVERY 30 MIN PRN
Status: DISCONTINUED | OUTPATIENT
Start: 2023-06-06 | End: 2023-06-06 | Stop reason: HOSPADM

## 2023-06-06 RX ORDER — ONDANSETRON 2 MG/ML
4 INJECTION INTRAMUSCULAR; INTRAVENOUS
Status: DISCONTINUED | OUTPATIENT
Start: 2023-06-06 | End: 2023-06-06 | Stop reason: HOSPADM

## 2023-06-06 RX ORDER — LIDOCAINE HYDROCHLORIDE 20 MG/ML
INJECTION, SOLUTION INFILTRATION; PERINEURAL PRN
Status: DISCONTINUED | OUTPATIENT
Start: 2023-06-06 | End: 2023-06-06

## 2023-06-06 RX ORDER — PROPOFOL 10 MG/ML
INJECTION, EMULSION INTRAVENOUS CONTINUOUS PRN
Status: DISCONTINUED | OUTPATIENT
Start: 2023-06-06 | End: 2023-06-06

## 2023-06-06 RX ORDER — NALOXONE HYDROCHLORIDE 0.4 MG/ML
0.2 INJECTION, SOLUTION INTRAMUSCULAR; INTRAVENOUS; SUBCUTANEOUS
Status: DISCONTINUED | OUTPATIENT
Start: 2023-06-06 | End: 2023-06-06 | Stop reason: HOSPADM

## 2023-06-06 RX ORDER — PROCHLORPERAZINE MALEATE 5 MG
5 TABLET ORAL EVERY 6 HOURS PRN
Status: DISCONTINUED | OUTPATIENT
Start: 2023-06-06 | End: 2023-06-06 | Stop reason: HOSPADM

## 2023-06-06 RX ORDER — SODIUM CHLORIDE, SODIUM LACTATE, POTASSIUM CHLORIDE, CALCIUM CHLORIDE 600; 310; 30; 20 MG/100ML; MG/100ML; MG/100ML; MG/100ML
INJECTION, SOLUTION INTRAVENOUS CONTINUOUS
Status: DISCONTINUED | OUTPATIENT
Start: 2023-06-06 | End: 2023-06-06 | Stop reason: HOSPADM

## 2023-06-06 RX ORDER — ONDANSETRON 2 MG/ML
4 INJECTION INTRAMUSCULAR; INTRAVENOUS EVERY 6 HOURS PRN
Status: DISCONTINUED | OUTPATIENT
Start: 2023-06-06 | End: 2023-06-06 | Stop reason: HOSPADM

## 2023-06-06 RX ORDER — FLUMAZENIL 0.1 MG/ML
0.2 INJECTION, SOLUTION INTRAVENOUS
Status: DISCONTINUED | OUTPATIENT
Start: 2023-06-06 | End: 2023-06-06 | Stop reason: HOSPADM

## 2023-06-06 RX ADMIN — LIDOCAINE HYDROCHLORIDE 80 MG: 20 INJECTION, SOLUTION INFILTRATION; PERINEURAL at 10:05

## 2023-06-06 RX ADMIN — PROPOFOL 30 MG: 10 INJECTION, EMULSION INTRAVENOUS at 10:09

## 2023-06-06 RX ADMIN — PROPOFOL 150 MCG/KG/MIN: 10 INJECTION, EMULSION INTRAVENOUS at 10:05

## 2023-06-06 RX ADMIN — SODIUM CHLORIDE, POTASSIUM CHLORIDE, SODIUM LACTATE AND CALCIUM CHLORIDE: 600; 310; 30; 20 INJECTION, SOLUTION INTRAVENOUS at 10:05

## 2023-06-06 RX ADMIN — TOPICAL ANESTHETIC 1 SPRAY: 200 SPRAY DENTAL; PERIODONTAL at 10:05

## 2023-06-06 RX ADMIN — ONDANSETRON 4 MG: 2 INJECTION INTRAMUSCULAR; INTRAVENOUS at 10:14

## 2023-06-06 RX ADMIN — PROPOFOL 20 MG: 10 INJECTION, EMULSION INTRAVENOUS at 10:11

## 2023-06-06 RX ADMIN — GLYCOPYRROLATE 0.2 MG: 0.2 INJECTION, SOLUTION INTRAMUSCULAR; INTRAVENOUS at 10:05

## 2023-06-06 ASSESSMENT — ACTIVITIES OF DAILY LIVING (ADL)
ADLS_ACUITY_SCORE: 35
ADLS_ACUITY_SCORE: 35

## 2023-06-06 ASSESSMENT — LIFESTYLE VARIABLES: TOBACCO_USE: 1

## 2023-06-06 NOTE — DISCHARGE INSTRUCTIONS
Discharge Instructions after  Upper Endoscopy (EGD)    Activity and Diet  You were given medicine for pain. You may be dizzy or sleepy.  For 24 hours:   Do not drive or use heavy equipment.   Do not make important decisions.   Do not drink any alcohol.  ___ You may return to your regular diet.    Discomfort  You may have a sore throat for 2 to 3 days. It may help to:   Avoid hot liquids for 24 hours.   Use sore throat lozenges.   Gargle as needed with salt water up to 4 times a day. Mix 1 cup of warm water  with 1 teaspoon of salt. Do not swallow.  ___ Your esophagus was dilated (opened) or banded during the exam:   Drink only cool liquids for the rest of the day. Eat a soft diet for the next few days.   You may have a sore chest for 2 to 3 days.    You may take Tylenol (acetaminophen) for pain unless your doctor has told you not to.    Do not take aspirin or ibuprofen (Advil, Motrin) or other NSAIDS  (anti-inflammatory drugs) for ___ days.    Follow-up  ___ We took small tissue samples for study. If you do not have a follow-up visit scheduled,  call your provider s office in 2 weeks for the results.    Other instructions________________________________________________________    When to call us:  Problems are rare. Call right away if you have:   Unusual throat pain or trouble swallowing   Unusual pain in belly or chest that is not relieved by belching or passing air   Black stools (tar-like looking bowel movement)   Temperature above 100.6  F. (37.5  C).    If you vomit blood or have severe pain, go to an emergency room.    If you have questions, call:  Monday to Friday, 8 a.m. to 4:30 p.m.: Central Scheduling Department:[phone]    After hours: Hospital: [phone] (Ask for the GI fellow on call)

## 2023-06-06 NOTE — H&P
Vernon Ortiz  [id number]  male  63 year old      Reason for procedure/surgery: Vernon Ortiz is a 63 year old male with a PMHx of necrotizing pancreatitis that had been managed by IR drain placement in October 2022 and an attempted transgastric drainage of walled-off necrosis and an endoscopic debridement via EUS however was unsuccessful. EUS showed walled of central pancreatic necrosis. Not amenable to EUS-guided transmural drainage due to limited contact with the gastric wall and lack of fluid component. He subsequently underwent laparoscopic necrosectomy on 01/05/2023 with removal of 14 cm solid piece of necrotic pancreas- this appeared to be the entire portion of necrotic pancreas seen on prior imaging. CT AP on 01/23/2023 showed pancreatic tail fluid collection measuring 3.0 x 2.7 cm and pancreatic body collection measuring 3.1 cm and 1.2 cm.Recent MRCP  On 4/7/2023 showed residual pancreatic tissue in tail that measures 3 cm in diameter with a PD measuring 2.5 mm and a separate residual pancreatic head and uncinate tissue area measures approximately 3.3 x 5.0 x 2.5 cm with normal caliber pancreatic duct consistent with disrupted pancreatic duct. However, no evidence of mass, pseudocyst, or walled-off necrosis on imaging. He had an ERCP on 4/17/2023 with placement of two 7 Fr x 5 cm DPT Zimmon pancreatic stents and one 7 Fr x 7 cm DPT Zimmon pancreatic stent was placed into the cavity of prior walled off pancreatic necrosis over a wire. He since had his percutaneous drain removed. He presents today for removal of ventral pancreatic duct stent.     Patient Active Problem List   Diagnosis     Necrotizing pancreatitis     Hard to intubate       Past Surgical History:    Past Surgical History:   Procedure Laterality Date     APPENDECTOMY       ENDOSCOPIC RETROGRADE CHOLANGIOPANCREATOGRAM N/A 4/17/2023    Procedure: ENDOSCOPIC RETROGRADE CHOLANGIOPANCREATOGRAPHY with stents placed into pancreatic fistula via gastro  ",gastrectomy x3, resuturing of drain on left lower abdomen;  Surgeon: Guru Monika Etienne MD;  Location: [facility]     ENDOSCOPIC ULTRASOUND UPPER GASTROINTESTINAL TRACT (GI) N/A 12/08/2022    Procedure: ESOPHAGOGASTRODUODENOSCOPY, WITH ENDOSCOPIC ULTRASOUND,;  Surgeon: Krish Ellison MD;  Location: [facility]     IR ABSCESS TUBE CHANGE  11/01/2022     IR PERITONEAL ABSCESS DRAINAGE  10/26/2022     IR SINOGRAM INJECTION DIAGNOSTIC  11/15/2022     LAPAROSCOPY DIAGNOSTIC (GENERAL) N/A 1/5/2023    Procedure: LAPAROSCOPY, DIAGNOSTIC, BY GENERAL SURGERY, PANCREAS NECROSECTOMY;  Surgeon: Paulo Martinez MD;  Location: [facility]       Past Medical History:   Past Medical History:   Diagnosis Date     Diabetes mellitus (H)      HLD (hyperlipidemia)      Hypothyroidism      Necrotizing pancreatitis        Social History:   Social History     Tobacco Use     Smoking status: Former     Years: 9.00     Types: Cigarettes     Smokeless tobacco: Never   Vaping Use     Vaping status: Not on file   Substance Use Topics     Alcohol use: Never       Family History:   Family History   Problem Relation Age of Onset     Deep Vein Thrombosis (DVT) Father      Anesthesia Reaction No family hx of      Cardiovascular No family hx of        Allergies: No Known Allergies    Active Medications:   No current outpatient medications on file.       Systemic Review:   CONSTITUTIONAL: NEGATIVE for fever, chills, change in weight  ENT/MOUTH: NEGATIVE for ear, mouth and throat problems  RESP: NEGATIVE for significant cough or SOB  CV: NEGATIVE for chest pain, palpitations or peripheral edema    Physical Examination:   Vital Signs: /73   Pulse (!) 42   Temp 97.7  F (36.5  C) (Oral)   Resp 16   Ht 1.803 m (5' 11\")   Wt 89.4 kg (197 lb)   SpO2 99%   BMI 27.48 kg/m    GENERAL: healthy, alert and no distress  NECK: no adenopathy, no asymmetry, masses, or scars  RESP: lungs clear to auscultation - no rales, rhonchi or " wheezes  CV: regular rate and rhythm, normal S1 S2, no S3 or S4, no murmur, click or rub, no peripheral edema and peripheral pulses strong  ABDOMEN: soft, nontender, no hepatosplenomegaly, no masses and bowel sounds normal  MS: no gross musculoskeletal defects noted, no edema    Plan: Appropriate to proceed as scheduled.      Serafin Montes MD  6/6/2023    PCP:  Hardeep Dawson

## 2023-06-06 NOTE — BRIEF OP NOTE
[facility]    Brief Operative Note  Vernon Ortiz is a 63 year old male with a PMHx of necrotizing pancreatitis that had been managed by IR drain placement in October 2022 and an attempted transgastric drainage of walled-off necrosis and an endoscopic debridement via EUS however was unsuccessful. EUS showed walled of central pancreatic necrosis. Not amenable to EUS-guided transmural drainage due to limited contact with the gastric wall and lack of fluid component. He subsequently underwent laparoscopic necrosectomy on 01/05/2023 with removal of 14 cm solid piece of necrotic pancreas- this appeared to be the entire portion of necrotic pancreas seen on prior imaging. CT AP on 01/23/2023 showed pancreatic tail fluid collection measuring 3.0 x 2.7 cm and pancreatic body collection measuring 3.1 cm and 1.2 cm.Recent MRCP  On 4/7/2023 showed residual pancreatic tissue in tail that measures 3 cm in diameter with a PD measuring 2.5 mm and a separate residual pancreatic head and uncinate tissue area measures approximately 3.3 x 5.0 x 2.5 cm with normal caliber pancreatic duct consistent with disrupted pancreatic duct. However, no evidence of mass, pseudocyst, or walled-off necrosis on imaging. He had an ERCP on 4/17/2023 with placement of two 7 Fr x 5 cm DPT Zimmon pancreatic stents and one 7 Fr x 7 cm DPT Zimmon pancreatic stent was placed into the cavity of prior walled off pancreatic necrosis over a wire. He since had his percutaneous drain removed. He presents today for removal of ventral pancreatic duct stent.     Pre-operative diagnosis: Pancreatic duct disruption [K86.89]  Post-operative diagnosis Same as pre-operative diagnosis    Procedure: Procedure(s):  Esophagoscopy, gastroscopy, duodenoscopy (EGD), combined  Surgeon: Surgeon(s) and Role:     * Guru Monika Etienne MD - Primary  Anesthesia: MAC   Estimated Blood Loss:  None    Drains: None  Specimens: * No specimens in log *  Findings:     - Pancreatic stent was seen emerging from the ventral pancreatic duct. This was successfully removed with a snare.   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  Implants: * No implants in log *     Recommendations  - Observe in PACU with plans to discharge to home  - Follow up with Dr. Martinez as needed  - Follow up with endocrinologist and primary care provider

## 2023-06-06 NOTE — ANESTHESIA POSTPROCEDURE EVALUATION
Patient: Vernon Ortiz    Procedure: Procedure(s):  Esophagoscopy, gastroscopy, duodenoscopy (EGD), combined       Anesthesia Type:  MAC    Note:  Disposition: Outpatient   Postop Pain Control: Uneventful            Sign Out: Well controlled pain   PONV: No   Neuro/Psych: Uneventful            Sign Out: Acceptable/Baseline neuro status   Airway/Respiratory: Uneventful            Sign Out: Acceptable/Baseline resp. status   CV/Hemodynamics: Uneventful            Sign Out: Acceptable CV status; No obvious hypovolemia; No obvious fluid overload   Other NRE: NONE   DID A NON-ROUTINE EVENT OCCUR? No           Last vitals:  Vitals Value Taken Time   /70 06/06/23 1032   Temp     Pulse 64 06/06/23 1032   Resp 14 06/06/23 1032   SpO2 97 % 06/06/23 1037   Vitals shown include unvalidated device data.    Electronically Signed By: Rusty Ojeda MD  June 6, 2023  10:38 AM

## 2023-06-06 NOTE — ANESTHESIA PREPROCEDURE EVALUATION
Anesthesia Pre-Procedure Evaluation    Patient: Vernon Ortiz   MRN: [id number] : 1960        Procedure : Procedure(s):  Esophagoscopy, gastroscopy, duodenoscopy (EGD), combined          Past Medical History:   Diagnosis Date     Diabetes mellitus (H)      HLD (hyperlipidemia)      Hypothyroidism      Necrotizing pancreatitis       Past Surgical History:   Procedure Laterality Date     APPENDECTOMY       ENDOSCOPIC RETROGRADE CHOLANGIOPANCREATOGRAM N/A 2023    Procedure: ENDOSCOPIC RETROGRADE CHOLANGIOPANCREATOGRAPHY with stents placed into pancreatic fistula via gastro ,gastrectomy x3, resuturing of drain on left lower abdomen;  Surgeon: Guru Monika Etienne MD;  Location: [facility] OR     ENDOSCOPIC ULTRASOUND UPPER GASTROINTESTINAL TRACT (GI) N/A 2022    Procedure: ESOPHAGOGASTRODUODENOSCOPY, WITH ENDOSCOPIC ULTRASOUND,;  Surgeon: Krish Ellison MD;  Location: [facility] OR     IR ABSCESS TUBE CHANGE  2022     IR PERITONEAL ABSCESS DRAINAGE  10/26/2022     IR SINOGRAM INJECTION DIAGNOSTIC  11/15/2022     LAPAROSCOPY DIAGNOSTIC (GENERAL) N/A 2023    Procedure: LAPAROSCOPY, DIAGNOSTIC, BY GENERAL SURGERY, PANCREAS NECROSECTOMY;  Surgeon: Paulo Martinez MD;  Location: [facility] OR      No Known Allergies   Social History     Tobacco Use     Smoking status: Former     Years: 9.00     Types: Cigarettes     Smokeless tobacco: Never   Vaping Use     Vaping status: Not on file   Substance Use Topics     Alcohol use: Never      Wt Readings from Last 1 Encounters:   23 89.4 kg (197 lb)        Anesthesia Evaluation   Pt has had prior anesthetic. Type: General and MAC.    History of anesthetic complications (Anterior airway, Glidescope 4 gr1 view, DL gr3 view)  - difficult airway.      ROS/MED HX  ENT/Pulmonary:     (+) tobacco use, Current use,     Neurologic:       Cardiovascular:     (+) Dyslipidemia hypertension-----    METS/Exercise Tolerance:     Hematologic:        Musculoskeletal:       GI/Hepatic: Comment: Necrotizing pancreatitis in Oct 2022 s/p stents      Renal/Genitourinary:       Endo:     (+) type II DM,  (-) Type I DM   Psychiatric/Substance Use:       Infectious Disease:       Malignancy:       Other:            Physical Exam    Airway        Mallampati: III   TM distance: < 3 FB   Neck ROM: full   Mouth opening: > 3 cm    Respiratory Devices and Support         Dental           Cardiovascular   cardiovascular exam normal       Rhythm and rate: regular and normal     Pulmonary   pulmonary exam normal        breath sounds clear to auscultation           OUTSIDE LABS:  CBC:   Lab Results   Component Value Date    WBC 4.4 04/17/2023    WBC 16.3 (H) 01/08/2023    HGB 13.3 04/17/2023    HGB 13.5 01/08/2023    HCT 41.8 04/17/2023    HCT 42.6 01/08/2023     (L) 04/17/2023     01/08/2023     BMP:   Lab Results   Component Value Date     04/17/2023     (L) 01/06/2023    POTASSIUM 4.3 04/17/2023    POTASSIUM 4.2 01/06/2023    CHLORIDE 103 04/17/2023    CHLORIDE 102 01/06/2023    CO2 23 04/17/2023    CO2 21 (L) 01/06/2023    BUN 21.9 04/17/2023    BUN 11.8 01/06/2023    CR 1.01 04/17/2023    CR 0.84 01/06/2023     (H) 06/06/2023     (H) 04/17/2023     COAGS:   Lab Results   Component Value Date    INR 1.07 04/17/2023     POC: No results found for: BGM, HCG, HCGS  HEPATIC:   Lab Results   Component Value Date    ALBUMIN 3.9 04/17/2023    PROTTOTAL 6.8 04/17/2023    ALT 15 04/17/2023    AST 17 04/17/2023    ALKPHOS 56 04/17/2023    BILITOTAL 0.4 04/17/2023     OTHER:   Lab Results   Component Value Date    LACT 0.9 01/06/2023    A1C 5.6 10/27/2022    DOLORES 8.6 (L) 04/17/2023    PHOS 3.1 11/07/2022    MAG 1.9 10/30/2022    LIPASE 21 04/17/2023    AMYLASE 43 04/17/2023       Anesthesia Plan    ASA Status:  3   NPO Status:  NPO Appropriate    Anesthesia Type: MAC.     - Reason for MAC: immobility needed, straight local not clinically adequate    Induction: Propofol.   Maintenance: TIVA.        Consents    Anesthesia Plan(s) and associated risks, benefits, and realistic alternatives discussed. Questions answered and patient/representative(s) expressed understanding.     - Discussed: Risks, Benefits and Alternatives for BOTH SEDATION and the PROCEDURE were discussed     - Discussed with:  Patient         Postoperative Care    Pain management: IV analgesics, Multi-modal analgesia.   PONV prophylaxis: Ondansetron (or other 5HT-3), Background Propofol Infusion     Comments:                    Rusty Ojeda MD

## 2023-06-06 NOTE — ANESTHESIA CARE TRANSFER NOTE
Patient: Vernon Ortiz    Procedure: Procedure(s):  Esophagoscopy, gastroscopy, duodenoscopy (EGD), combined       Diagnosis: Pancreatic duct disruption [K86.89]  Diagnosis Additional Information: No value filed.    Anesthesia Type:   MAC     Note:    Oropharynx: oropharynx clear of all foreign objects and spontaneously breathing  Level of Consciousness: awake  Oxygen Supplementation: room air    Independent Airway: airway patency satisfactory and stable  Dentition: dentition unchanged  Vital Signs Stable: post-procedure vital signs reviewed and stable  Report to RN Given: handoff report given  Patient transferred to: Phase II    Handoff Report: Identifed the Patient, Identified the Reponsible Provider, Reviewed the pertinent medical history, Discussed the surgical course, Reviewed Intra-OP anesthesia mangement and issues during anesthesia, Set expectations for post-procedure period and Allowed opportunity for questions and acknowledgement of understanding      Vitals:  Vitals Value Taken Time   /80    Temp     Pulse 50    Resp     SpO2 98%        Electronically Signed By: CHRISTIAN Agustin CRNA  June 6, 2023  10:31 AM

## 2023-06-06 NOTE — OR NURSING
Pt underwent EGD with stent removal under MAC. Specimens: none. Removed stent intact on inspection. Pt transferred to recovery and report given to endo RN.       Dori Mendez RN

## 2023-10-22 ENCOUNTER — HEALTH MAINTENANCE LETTER (OUTPATIENT)
Age: 63
End: 2023-10-22

## 2024-03-10 ENCOUNTER — HEALTH MAINTENANCE LETTER (OUTPATIENT)
Age: 64
End: 2024-03-10

## 2024-07-28 ENCOUNTER — HEALTH MAINTENANCE LETTER (OUTPATIENT)
Age: 64
End: 2024-07-28

## 2024-08-19 NOTE — PROGRESS NOTES
Diabetes Consult Daily  Progress Note          Assessment/Plan:     HPI:  Pt is a 61 y/o male with a medical hx hyperlipidemia, hypothyroidism, necrotizing pancreatitis resulting in new onset of pancreatogenic insulin dependent DM who is now s/p diagnostic laparoscopy and pancreatic necrosectomy 1/5/2023 performed by Dr. KONG Martinez.   IDS consulted for assistance with glucose management on 1/7/2023.     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    2)  Hyperglycemia 2/2 steroids/stress/insufficient insulin regimen  3)  Anemia  4)  Fevers; intermittent   5)  Leukocytosis; multifactoral        Plan:       -  Lantus 55 units at 1300    -  Novolog meal coverage 1 unit(s) per 5 g cho AC meals/snacks    -  Novolog sliding scale insulin -> custom 2/30 TID AC/HS    -  BG monitoring TID AC, HS and 0200    -  PTA meds: N/A    -  Hypoglycemia protocol    -  Recommend carb counting protocol    -  Education :  TBD     -  Outpatient follow up:  recommend [facility] vs PCP    -  Please do not hesitate to contact the team with any questions/concerns.     -  Please notify inpatient diabetes service if changes are planned that will impact glycemia, such as NPO, starting/adjusting steroids, enteral feeding, parenteral feeding, dextrose fluids or procedures.       IDS to sign off - discharging today    Recommendations for Discharge:     **Instructions to patient were posted in AVS and discussed   Medications and supplies are to be ordered by primary service on discharge.   If there are problems or issues with ordering for discharge, you may contact the pharmacist directly for assistance     *please use the DIAB non-branded discharge supply order set ([id number])*     -blood glucose monitoring three times daily before meals and at bedtime or resume your CGM  -Glargine (Lantus, or Basaglar, per insurance coverage) 55 units daily in the  [FreeTextEntry1] : 14y/o presents for followup for recurrent BV  1300  -Aspart (Novolog or Humalog, per insurance coverage): 1:5 g CHO with meals and snacks  -Aspart (Novolog or Humalog, per insurance coverage): 2/30 intensity sliding scale (see below)    **As discussed and per your experience last time, if your AM fasting BG is <120 when you wake can start to reduce by 20%   To refresh it is the current dose (so for example 55 unit(s) x .20 = 11, so reduce to 44 unit(s).     Novolog 1 unit(s) per 5 grams of carbohydrate.   If the long-acting changes then to 44 unit(s) the meal insulin would change to 1 unit(s) per 6 grams of carbohydrates    Novolog custom scale 2/30 TID AC/HS  ISF 15   2 per 30 >/= 140  -169 give 2 units.  -199 give 4 units.  -229 give 6 units.  -259 give 8 units.  -289 give 10 units.  -319 give 12 units.  -349 give 14 units.  BG >/= 350 give 16 units.     -229 give 2 units.  -259 give 4 units.  -289 give 6 units.  -319 give 8 units.  -349 give 10 units.  BG >/= 350 give 12 units.     Plan discussed with patient, bedside RN/primary team via this note.           Interval History:     The last 24 hours progress and nursing notes reviewed.      BG trend:    Trending well off drip, slightly lower at 0200, awaiting 0800 BG this am to help inform decision for home dosing.     He did not have any snack and 0800  - he is ok with 55 unit(s) and we have reviewed in detail when and how to reduce should his fasting BG start to drift as they did after his last admission.  He has a follow up with Dr. Ghosh - they will see about a virtual, they also have a follow up closer to home in [place]    He is feeling well, ate a good breakfast.  No additional questions or concerns.   Reviewed his discharge instructions in detail with patient and wife.         Planned discharge today   IDS to sign off.      Planned Procedures/surgeries: none  D5W-containing solutions/medications: none    Recent Labs   Lab  "01/09/23  0201 01/08/23  2232 01/08/23  1741 01/08/23  1533 01/08/23  1435 01/08/23  1333   * 140* 149* 178* 129* 162*         Nutrition:     Orders Placed This Encounter      Regular Diet Adult      Diet        PTA Regimen:     Lantus 15 unit(s) daily  1 unit(s) per 15 g cho  Was not using the sliding scale insulin \"didn't need it\"          Review of Systems:   CC: denies          Medications:   Steroid planning:  none  Tube Feeding: none       Physical Exam:   /87 (BP Location: Left arm)   Pulse 79   Temp (!) 96.1  F (35.6  C) (Axillary)   Resp 16   Ht 1.803 m (5' 11\")   Wt 92.4 kg (203 lb 11.3 oz)   SpO2 96%   BMI 28.41 kg/m      General:   A&O, NAD, pleasant, resting comfortably. Well nourished - wife at bedside   HEENT:  NC/AT. MMM, EOMI, Anicteric. Hearing WNL.   Lungs:  unremarkable, no new cough, no SOB  ABD:   rounded, soft  Extremities:  Moving all extremities, no edema, non-tender.   Skin:  warm and dry, no obvious lesions/rash/bleeding  Neuro:  No focal neurological deficits  Psych:   Cooperative, appropriate mood, good eye contact, congruent affect          Data:     Lab Results   Component Value Date    A1C 5.6 10/27/2022        CBC RESULTS: Recent Labs   Lab Test 01/08/23  0917   WBC 16.3*   RBC 4.83   HGB 13.5   HCT 42.6   MCV 88   MCH 28.0   MCHC 31.7   RDW 13.1          Recent Labs   Lab Test 01/09/23  0201 01/08/23  2232 01/06/23  0759 01/06/23  0746 01/06/23  0003 01/05/23  2043 12/08/22  1736 12/08/22  1012   NA  --   --   --  135*  --   --   --  138   POTASSIUM  --   --   --  4.2  --   --   --  4.4   CHLORIDE  --   --   --  102  --   --   --  103   CO2  --   --   --  21*  --   --   --  23   ANIONGAP  --   --   --  12  --   --   --  12   * 140*   < > 187*   < >  --    < > 118*   BUN  --   --   --  11.8  --   --   --  16.3   CR  --   --   --  0.84  --  0.90  --  0.89   DOLORES  --   --   --  8.8  --   --   --  9.5    < > = values in this interval not displayed. "     Liver Function Studies -   Recent Labs   Lab Test 10/29/22  0518   PROTTOTAL 5.7*   ALBUMIN 2.8*   BILITOTAL 1.0   ALKPHOS 64   AST 25   ALT 22     Lab Results   Component Value Date    INR 1.02 12/08/2022       CHRISTIAN De La Vega CNP   Inpatient Diabetes Management Service  Pager - [phone]  Available on [employer]     To contact Endocrine Diabetes service:   From 8AM-4PM: page inpatient diabetes provider who is following the patient that day (see filed or incomplete progress notes/consult notes).  If uncertain of provider assignment: page job code [phone]. (To page job code in-house dial 3 stars, [phone] then enter number).  For questions or updates AFTER HOURS from 4PM-8AM: page the diabetes job code for on call fellow: [phone]    Please notify inpatient diabetes service if changes are planned to steroids, nutrition, or if procedures are planned requiring prolonged NPO status.Diabetes Management Team job code: [phone]    I spent a total of 45 minutes on the date of the encounter doing prep/post-work, chart review, history and exam, documentation and further activities per the note including lab review, multidisciplinary communication, counseling the patient and/or coordinating care regarding acute hyper/hypoglycemic management and discharge planning.  See note for details.

## 2024-12-15 ENCOUNTER — HEALTH MAINTENANCE LETTER (OUTPATIENT)
Age: 64
End: 2024-12-15

## 2025-03-16 ENCOUNTER — HEALTH MAINTENANCE LETTER (OUTPATIENT)
Age: 65
End: 2025-03-16

## 2025-06-29 ENCOUNTER — HEALTH MAINTENANCE LETTER (OUTPATIENT)
Age: 65
End: 2025-06-29

## (undated) DEVICE — KIT ENDO FIRST STEP DISINFECTANT 200ML W/POUCH EP-4

## (undated) DEVICE — ENDO FUSION OMNI-TOME G31903

## (undated) DEVICE — ENDO CAP AND TUBING STERILE FOR ENDOGATOR  100130

## (undated) DEVICE — SOL NACL 0.9% IRRIG 1000ML BOTTLE 2F7124

## (undated) DEVICE — SUCTION MANIFOLD NEPTUNE 2 SYS 4 PORT 0702-020-000

## (undated) DEVICE — ENDO TROCAR FIRST ENTRY KII FIOS Z-THRD 05X100MM CTF03

## (undated) DEVICE — LINEN TOWEL PACK X6 WHITE 5487

## (undated) DEVICE — ESU GROUND PAD ADULT W/CORD E7507

## (undated) DEVICE — SU ETHILON 3-0 PS-1 18" 1663H

## (undated) DEVICE — LIGHT HANDLE X1 31140133

## (undated) DEVICE — KIT CONNECTOR FOR OLYMPUS ENDOSCOPES DEFENDO 100310

## (undated) DEVICE — ENDO TUBING CO2 SMARTCAP STERILE DISP 100145CO2EXT

## (undated) DEVICE — CATH RETRIEVAL BALLOON EXTRACTOR PRO RX-S INJ ABOVE 9-12MM

## (undated) DEVICE — BIOPSY VALVE BIOSHIELD 00711135

## (undated) DEVICE — WIRE GUIDE 0.025"X270CM STR VISIGLIDE G-240-2527S

## (undated) DEVICE — DRSG STERI STRIP 1/2X4" R1547

## (undated) DEVICE — ANTIFOG SOLUTION W/FOAM PAD CF-1002

## (undated) DEVICE — DRAIN JACKSON PRATT RESERVOIR 100ML SU130-1305

## (undated) DEVICE — SU PDS II 0 TP-1 60" Z991G

## (undated) DEVICE — SOL WATER IRRIG 1000ML BOTTLE 2F7114

## (undated) DEVICE — ENDO POUCH UNIV RETRIEVAL SYSTEM INZII 10MM CD001

## (undated) DEVICE — ENDO FUSION OMNI-TOME 21 FS-OMNI-21 G48675

## (undated) DEVICE — PREP CHLORAPREP 26ML TINTED HI-LITE ORANGE 930815

## (undated) DEVICE — SOL ADH LIQUID BENZOIN SWAB 0.6ML C1544

## (undated) DEVICE — LABEL MEDICATION SYSTEM 3303-P

## (undated) DEVICE — WIRE GUIDE 0.035"X450CM JAGWIRE HP STR TIP M00556580

## (undated) DEVICE — ENDO BITE BLOCK ADULT OMNI-BLOC

## (undated) DEVICE — GUIDEWIRE NOVAGOLD .018X260CM STR TIP M00552000

## (undated) DEVICE — LINEN TOWEL PACK X30 5481

## (undated) DEVICE — SPONGE LAP 18X18" X8435

## (undated) DEVICE — SU VICRYL 2-0 CT-2 27" UND J269H

## (undated) DEVICE — WIRE GUIDE 0.025"X270CM ANG VISIGLIDE G-240-2527A

## (undated) DEVICE — BLADE CLIPPER SGL USE 9680

## (undated) DEVICE — SOL NACL 0.9% INJ 1000ML BAG 2B1324X

## (undated) DEVICE — SU MONOCRYL 4-0 PS-2 27" UND Y426H

## (undated) DEVICE — SU PDS II 0 CTX 36" Z370T

## (undated) DEVICE — PACK ENDOSCOPY GI CUSTOM UMMC

## (undated) DEVICE — TUBING SMOKE EVAC PNEUMOCLEAR HIGH FLOW 0620050250

## (undated) DEVICE — ENDO PROBE COVER ULTRASOUND BALLOON LATEX  MAJ-249

## (undated) DEVICE — NDL INSUFFLATION 13GA 120MM C2201

## (undated) DEVICE — PAD CHUX UNDERPAD 23X24" 7136

## (undated) DEVICE — SUCTION IRR STRYKERFLOW II W/TIP 250-070-520

## (undated) DEVICE — ENDO TROCAR SLEEVE KII Z-THREADED 05X100MM CTS02

## (undated) DEVICE — Device

## (undated) DEVICE — GLOVE PROTEXIS W/NEU-THERA 7.5  2D73TE75

## (undated) DEVICE — SU VICRYL 0 UR-6 27" J603H

## (undated) DEVICE — GLOVE PROTEXIS BLUE W/NEU-THERA 8.0  2D73EB80

## (undated) DEVICE — ESU PENCIL W/COATED BLADE E2450H

## (undated) DEVICE — SU VICRYL 3-0 SH 27" UND J416H

## (undated) RX ORDER — PROPOFOL 10 MG/ML
INJECTION, EMULSION INTRAVENOUS
Status: DISPENSED
Start: 2023-01-05

## (undated) RX ORDER — PROPOFOL 10 MG/ML
INJECTION, EMULSION INTRAVENOUS
Status: DISPENSED
Start: 2022-12-08

## (undated) RX ORDER — HYDROMORPHONE HYDROCHLORIDE 1 MG/ML
INJECTION, SOLUTION INTRAMUSCULAR; INTRAVENOUS; SUBCUTANEOUS
Status: DISPENSED
Start: 2023-01-05

## (undated) RX ORDER — LIDOCAINE HYDROCHLORIDE 10 MG/ML
INJECTION, SOLUTION EPIDURAL; INFILTRATION; INTRACAUDAL; PERINEURAL
Status: DISPENSED
Start: 2022-11-01

## (undated) RX ORDER — FENTANYL CITRATE-0.9 % NACL/PF 10 MCG/ML
PLASTIC BAG, INJECTION (ML) INTRAVENOUS
Status: DISPENSED
Start: 2023-04-17

## (undated) RX ORDER — BUPIVACAINE HYDROCHLORIDE 2.5 MG/ML
INJECTION, SOLUTION EPIDURAL; INFILTRATION; INTRACAUDAL
Status: DISPENSED
Start: 2023-01-05

## (undated) RX ORDER — CEFAZOLIN SODIUM 2 G/100ML
INJECTION, SOLUTION INTRAVENOUS
Status: DISPENSED
Start: 2022-10-26

## (undated) RX ORDER — FENTANYL CITRATE 50 UG/ML
INJECTION, SOLUTION INTRAMUSCULAR; INTRAVENOUS
Status: DISPENSED
Start: 2022-12-08

## (undated) RX ORDER — WATER 10 ML/10ML
INJECTION INTRAMUSCULAR; INTRAVENOUS; SUBCUTANEOUS
Status: DISPENSED
Start: 2023-04-17

## (undated) RX ORDER — FENTANYL CITRATE 50 UG/ML
INJECTION, SOLUTION INTRAMUSCULAR; INTRAVENOUS
Status: DISPENSED
Start: 2023-01-05

## (undated) RX ORDER — ACETAMINOPHEN 325 MG/1
TABLET ORAL
Status: DISPENSED
Start: 2023-01-05

## (undated) RX ORDER — FENTANYL CITRATE 50 UG/ML
INJECTION, SOLUTION INTRAMUSCULAR; INTRAVENOUS
Status: DISPENSED
Start: 2022-10-26

## (undated) RX ORDER — IOPAMIDOL 510 MG/ML
INJECTION, SOLUTION INTRAVASCULAR
Status: DISPENSED
Start: 2022-12-08

## (undated) RX ORDER — SODIUM CHLORIDE, SODIUM LACTATE, POTASSIUM CHLORIDE, CALCIUM CHLORIDE 600; 310; 30; 20 MG/100ML; MG/100ML; MG/100ML; MG/100ML
INJECTION, SOLUTION INTRAVENOUS
Status: DISPENSED
Start: 2023-04-17

## (undated) RX ORDER — LIDOCAINE HYDROCHLORIDE 10 MG/ML
INJECTION, SOLUTION EPIDURAL; INFILTRATION; INTRACAUDAL; PERINEURAL
Status: DISPENSED
Start: 2022-10-26

## (undated) RX ORDER — SIMETHICONE 40MG/0.6ML
SUSPENSION, DROPS(FINAL DOSAGE FORM)(ML) ORAL
Status: DISPENSED
Start: 2023-04-17

## (undated) RX ORDER — HYDRALAZINE HYDROCHLORIDE 20 MG/ML
INJECTION INTRAMUSCULAR; INTRAVENOUS
Status: DISPENSED
Start: 2023-01-05

## (undated) RX ORDER — FENTANYL CITRATE 50 UG/ML
INJECTION, SOLUTION INTRAMUSCULAR; INTRAVENOUS
Status: DISPENSED
Start: 2023-04-17

## (undated) RX ORDER — ONDANSETRON 2 MG/ML
INJECTION INTRAMUSCULAR; INTRAVENOUS
Status: DISPENSED
Start: 2023-04-17

## (undated) RX ORDER — PROPOFOL 10 MG/ML
INJECTION, EMULSION INTRAVENOUS
Status: DISPENSED
Start: 2023-04-17

## (undated) RX ORDER — DEXAMETHASONE SODIUM PHOSPHATE 4 MG/ML
INJECTION, SOLUTION INTRA-ARTICULAR; INTRALESIONAL; INTRAMUSCULAR; INTRAVENOUS; SOFT TISSUE
Status: DISPENSED
Start: 2023-04-17

## (undated) RX ORDER — IOPAMIDOL 510 MG/ML
INJECTION, SOLUTION INTRAVASCULAR
Status: DISPENSED
Start: 2023-04-17